# Patient Record
Sex: MALE | Race: WHITE | NOT HISPANIC OR LATINO | Employment: OTHER | URBAN - METROPOLITAN AREA
[De-identification: names, ages, dates, MRNs, and addresses within clinical notes are randomized per-mention and may not be internally consistent; named-entity substitution may affect disease eponyms.]

---

## 2017-05-16 ENCOUNTER — GENERIC CONVERSION - ENCOUNTER (OUTPATIENT)
Dept: OTHER | Facility: OTHER | Age: 72
End: 2017-05-16

## 2017-06-01 ENCOUNTER — TRANSCRIBE ORDERS (OUTPATIENT)
Dept: ADMINISTRATIVE | Facility: HOSPITAL | Age: 72
End: 2017-06-01

## 2017-06-01 ENCOUNTER — APPOINTMENT (OUTPATIENT)
Dept: LAB | Facility: HOSPITAL | Age: 72
End: 2017-06-01
Attending: UROLOGY
Payer: MEDICARE

## 2017-06-01 DIAGNOSIS — R97.20 ELEVATED PROSTATE SPECIFIC ANTIGEN (PSA): ICD-10-CM

## 2017-06-01 LAB — PSA SERPL-MCNC: 4.3 NG/ML (ref 0–4)

## 2017-06-01 PROCEDURE — 84153 ASSAY OF PSA TOTAL: CPT

## 2017-06-01 PROCEDURE — 36415 COLL VENOUS BLD VENIPUNCTURE: CPT

## 2017-06-05 ENCOUNTER — ALLSCRIPTS OFFICE VISIT (OUTPATIENT)
Dept: OTHER | Facility: OTHER | Age: 72
End: 2017-06-05

## 2017-11-13 ENCOUNTER — TRANSCRIBE ORDERS (OUTPATIENT)
Dept: ADMINISTRATIVE | Facility: HOSPITAL | Age: 72
End: 2017-11-13

## 2017-11-13 ENCOUNTER — ALLSCRIPTS OFFICE VISIT (OUTPATIENT)
Dept: OTHER | Facility: OTHER | Age: 72
End: 2017-11-13

## 2017-11-13 ENCOUNTER — APPOINTMENT (OUTPATIENT)
Dept: LAB | Facility: HOSPITAL | Age: 72
End: 2017-11-13
Payer: MEDICARE

## 2017-11-13 DIAGNOSIS — R97.20 ELEVATED PROSTATE SPECIFIC ANTIGEN (PSA): Primary | ICD-10-CM

## 2017-11-13 DIAGNOSIS — R97.20 ELEVATED PROSTATE SPECIFIC ANTIGEN (PSA): ICD-10-CM

## 2017-11-13 LAB
ANION GAP SERPL CALCULATED.3IONS-SCNC: 9 MMOL/L (ref 4–13)
BUN SERPL-MCNC: 12 MG/DL (ref 5–25)
CALCIUM SERPL-MCNC: 9.9 MG/DL (ref 8.3–10.1)
CHLORIDE SERPL-SCNC: 105 MMOL/L (ref 100–108)
CO2 SERPL-SCNC: 29 MMOL/L (ref 21–32)
CREAT SERPL-MCNC: 0.87 MG/DL (ref 0.6–1.3)
GFR SERPL CREATININE-BSD FRML MDRD: 87 ML/MIN/1.73SQ M
GLUCOSE SERPL-MCNC: 97 MG/DL (ref 65–140)
POTASSIUM SERPL-SCNC: 4.4 MMOL/L (ref 3.5–5.3)
SODIUM SERPL-SCNC: 143 MMOL/L (ref 136–145)

## 2017-11-13 PROCEDURE — 80048 BASIC METABOLIC PNL TOTAL CA: CPT

## 2017-11-13 PROCEDURE — 36415 COLL VENOUS BLD VENIPUNCTURE: CPT

## 2017-11-15 ENCOUNTER — HOSPITAL ENCOUNTER (OUTPATIENT)
Dept: MRI IMAGING | Facility: HOSPITAL | Age: 72
Discharge: HOME | End: 2017-11-15
Payer: MEDICARE

## 2017-11-15 DIAGNOSIS — R97.20 ELEVATED PROSTATE SPECIFIC ANTIGEN (PSA): ICD-10-CM

## 2017-11-15 PROCEDURE — A9585 GADOBUTROL INJECTION: HCPCS | Performed by: RADIOLOGY

## 2017-11-15 PROCEDURE — 76377 3D RENDER W/INTRP POSTPROCES: CPT

## 2017-11-15 PROCEDURE — 72197 MRI PELVIS W/O & W/DYE: CPT

## 2017-11-15 RX ADMIN — GADOBUTROL 9 ML: 604.72 INJECTION INTRAVENOUS at 15:43

## 2017-11-15 NOTE — PROGRESS NOTES
Assessment    1  Elevated PSA (790 93) (R97 20)    Plan  Elevated PSA    · Ciprofloxacin HCl - 500 MG Oral Tablet; 500 mg tablet twice daily starting one dayprior to prostate biopsy for three days total   Rx By: Adan Jiang; Dispense: 0 Days ; #:6 Tablet; Refill: 0;For: Elevated PSA; JAQUAN = N; Verified Transmission to [employer]; Last Updated By: System, [employer]; 11/13/2017 9:57:03 AM   · Fleet Enema 7-19 GM/118ML Rectal Enema; USE AS DIRECTED   Rx By: Adan Jiang; Dispense: 0 Days ; #:1 ML; Refill: 0;For: Elevated PSA; JAQUAN = N; Verified Transmission to [employer]; Last Updated By: System, [employer]; 11/13/2017 9:57:02 AM   · (1) BASIC METABOLIC PROFILE; Status:Active; Requested for:13Nov2017;    Perform:[facility]; EFT:[id number]; Ordered;PSA; Ordered By:Vivian Hopper;   · MRI PROSTATE MULTIPARAMETRIC WO W CONTRAST; Status:Need Information -Financial Authorization,Retrospective Authorization; Requested for:13Nov2017;    Perform:[facility]; Order Comments:s/p neg bx with elevating PSA; Due:13Nov2018; Last Updated Abdiel Jacobo; 11/13/2017 9:54:01 AM;Ordered;For:Elevated PSA; Ordered By:Vivian Hopper;   · Prostate Needle Biopsy - POC; Status:Active; Requested for:13Nov2017;    Perform: In Office; [phone]; Ordered;PSA; Ordered By:Vivian Hopper;    Discussion/Summary  Discussion Summary:   30-year-old male managed by Dr Naranjo Bodily  Elevated PSA s/p negative biopsy November 15, 2016 (PSA at time of biopsy was 4 72)  patient's PSA has risen since the time of his biopsy  He presents today with a PSA 5 37  Discussed proceeding with an MRI of his prostate as well as scheduling a TRUS biopsy after obtaining the MRI  Discussed that it will be up to the doctor as to whether or not he should proceed with a biopsy after the MRI has been obtained  If biopsy occurs the patient will need to take ciprofloxacin the day before, of, and after his biopsy   Fleet enema will perform the morning of  He has a history of traumatic brain injury and prior to his last biopsy was cleared by Neurology to have his Aggrenox stopped but to be continued on aspirin 81 mg  This should again apply to this current biopsy  The patient understands and agrees to this treatment plan  All questions and concerns were addressed and answered  Goals and Barriers: The patient has the current Goals: Control constipation with increased fiber in his diet  The patent has the current Barriers: None  Patient's Capacity to Self-Care: Patient is able to Self-Care  Chief Complaint  Chief Complaint Free Text Note Form: pt here for elevated PSA  5 37 (11/02/17)      History of Present Illness  HPI: Kay Garrido is a 62-year-old male here for follow-up evaluation of elevated PSA status post negative prostate biopsy November 30, 2016  PSA at the time of his biopsy was 4 72  PSA in June was 4 3 and now in November it is 5 37  He has no family history of prostate cancer but does have a right-sided prominence on digital rectal examination with no discrete nodule  Presents today with no lower urinary tract complaints aside for occasional nocturia 1-2 times per night  Admits to some intermittent constipation  Review of Systems  Complete-Male Urology:  Constitutional: No fever or chills, feels well, no tiredness, no recent weight gain or weight loss  Respiratory: No complaints of shortness of breath, no wheezing, no cough, no SOB on exertion, no orthopnea or PND  Cardiovascular: No complaints of slow heart rate, no fast heart rate, no chest pain, no palpitations, no leg claudication, no lower extremity  Gastrointestinal: No complaints of abdominal pain, no constipation, no nausea or vomiting, no diarrhea or bloody stools    Genitourinary: Empty sensation-- and-- stream quality good, but-- no dysuria,-- no urinary hesitancy,-- no hematuria,-- no incontinence-- and-- no feelings of urinary urgency--   The patient presents with complaints of nocturia (2 times per night )  Musculoskeletal: No complaints of arthralgia, no myalgias, no joint swelling or stiffness, no limb pain or swelling  Integumentary: No complaints of skin rash or skin lesions, no itching, no skin wound, no dry skin  Hematologic/Lymphatic: No complaints of swollen glands, no swollen glands in the neck, does not bleed easily, no easy bruising  Neurological: No compliants of headache, no confusion, no convulsions, no numbness or tingling, no dizziness or fainting, no limb weakness, no difficulty walking  ROS Reviewed:   ROS reviewed  Active Problems  1  Acute sinusitis (461 9) (J01 90)   2  Degenerative joint disease of right lower extremity (715 98) (M19 90)   3  Elevated PSA (790 93) (R97 20)   4  Encounter for screening for malignant neoplasm of colon (V76 51) (Z12 11)   5  History of seizure disorder (V12 49) (Z86 69)   6  Hyperlipidemia (272 4) (E78 5)   7  Intracranial injury of other and unspecified nature (854 00) (S06 9X9A)   8  Poison ivy (692 6) (L23 7)   9  Right shoulder pain (719 41) (M25 511)   10  Splinter (919 6) (T14 8XXA)   11  Transient cerebral ischemic attack (435 9) (G45 9)   12  Visual disturbances (368 9) (H53 9)    Past Medical History    1  History of Acute sinusitis (461 9) (J01 90)   2  History of Complete Tear Of The Anterior Cruciate Ligament Of The Right Knee (844 2)   3  History of Encounter for prostate cancer screening (V76 44) (Z12 5)   4  History of Encounter for screening colonoscopy (V76 51) (Z12 11)   5  History of Finger, superficial foreign body (splinter) (915 6) (S60 459A)   6  History of Need for prophylactic vaccination and inoculation against influenza (V04 81) (Z23)   7  History of Rib Fracture (807 00)  Active Problems And Past Medical History Reviewed: The active problems and past medical history were reviewed and updated today  Surgical History  1  History of Cataract Extraction   2   History of Needle Biopsy Of Prostate   3  History of Tonsillectomy  Surgical History Reviewed: The surgical history was reviewed and updated today  Family History  Mother    1  Family history of Hypertension (V17 49)  Father    2  Family history of Alzheimer Disease   3  Family history of Congestive Heart Failure  Family History    4  Denied: Family history of Drug abuse  Family History Reviewed: The family history was reviewed and updated today  Social History     · Being A Social Drinker   · Caffeine Use   · Dental care, regularly   · Former smoker (V15 82) (V78 713)   · Good dental hygiene   ·    · Sun Protection Sunscreen   · Uses Safety Equipment - Seatbelts  Social History Reviewed: The social history was reviewed and updated today  The social history was reviewed and is unchanged  Current Meds   1  Aggrenox  MG Oral Capsule Extended Release 12 Hour; Therapy: 38CHI5899 to (Last Rx:06Oct2011)  Requested for: 06Oct2011 Ordered   2  B Complex TABS; Therapy: (Recorded:39Olm6976) to Recorded   3  [employer] Loratadine 10 MG Oral Tablet; Therapy: (Cee Bonilla) to Recorded   4  Diclofenac Sodium 1 % Transdermal Gel; Apply to affected area QID; Therapy: 83VVJ9191 to (Evaluate:52Fwm0155); Last Rx:[id number] Ordered   5  Fish Oil CAPS; Therapy: ((08) 9009 7781) to Recorded   6  Garlic CAPS; Therapy: (Recorded:15Nov2016) to Recorded   7  Glucosamine CAPS; Therapy: (Recorded:15Nov2016) to Recorded   8  LevETIRAcetam 500 MG Oral Tablet; Therapy: 00XXF7601 to Recorded   9  Multiple Vitamin TABS; Therapy: (([phone] 9009 7781) to Recorded   10  Plaquenil TABS; Take 1 tablet twice daily Recorded   11  Simvastatin 10 MG Oral Tablet; Take 1 tablet daily Recorded   12  Vitamin C TABS; Therapy: ((08) 9009 7781) to Recorded   13  Vitamin E-400 CAPS; Therapy: ((08) 9009 7781) to Recorded   14  Zocor 20 MG Oral Tablet; Therapy: (Recorded:05Jun2017) to Recorded  Medication List Reviewed:    The medication list was reviewed and updated today  Allergies  1  No Known Drug Allergies    Vitals  Vital Signs    Recorded: 31BTV4357 09:45AM   Heart Rate 66   Systolic 259   Diastolic 70   Height 6 ft    Weight 221 lb    BMI Calculated 29 97   BSA Calculated 2 22       Physical Exam   Constitutional  General appearance: No acute distress, well appearing and well nourished  Eyes  Conjunctiva and lids: No erythema, swelling or discharge  Ears, Nose, Mouth, and Throat  Hearing: Normal    Pulmonary  Respiratory effort: No increased work of breathing or signs of respiratory distress  Abdomen  Abdomen: Non-tender, no masses  Genitourinary deferred  Musculoskeletal  Gait and station: Normal    Psychiatric  Mood and affect: Normal        Results/Data  (1) PSA, DIAGNOSTIC (FOLLOW-UP) [id number] 05:46PM Jan Medrano Order Number: [id number]     Test Name Result Flag Reference   PSA 4 3 ng/mL H 0 0-4 0   American Urological Association Guidelines define biochemical recurrence of prostate cancer as a detectable or rising PSA value post-radical prostatectomy that is greater than or equal to 0 2 ng/mL with a second confirmatory level of greater than or equal to 0 2 ng/mL         Signatures   Electronically signed by : [facility] MARTHA, ; Nov 13 2017 10:11AM EST                       (Author)    Electronically signed by : JAZ Horn ; Nov 14 2017  2:53PM EST

## 2017-12-05 DIAGNOSIS — R97.20 ELEVATED PROSTATE SPECIFIC ANTIGEN (PSA): ICD-10-CM

## 2018-01-14 VITALS
SYSTOLIC BLOOD PRESSURE: 138 MMHG | HEIGHT: 72 IN | HEART RATE: 66 BPM | DIASTOLIC BLOOD PRESSURE: 70 MMHG | WEIGHT: 221 LBS | BODY MASS INDEX: 29.93 KG/M2

## 2018-01-14 VITALS
SYSTOLIC BLOOD PRESSURE: 120 MMHG | HEART RATE: 58 BPM | WEIGHT: 230 LBS | DIASTOLIC BLOOD PRESSURE: 80 MMHG | BODY MASS INDEX: 31.19 KG/M2

## 2018-01-18 NOTE — RESULT NOTES
Verified Results  (1) TISSUE EXAM [id number] 12:00AM Evangelist Singleton     Test Name Result Flag Reference   LAB AP CASE REPORT (Report)     Surgical Pathology Report             Case: [id number]                   Authorizing Provider: Blanca Ray MD  Collected:      11/30/2016           Pathologist:      Wally Bruno MD   Received:      12/01/2016 1024        Specimens:  A) - Prostate, Left PZ                                         B) - Prostate, Right PZ                                        C) - Prostate, Left CZ                                         D) - Prostate, Right CZ   LAB AP FINAL DIAGNOSIS (Report)     A  Prostate, left peripheral zone (PZ), needle core biopsy:    - Benign prostatic tissue with foci of glandular atrophy  B  Prostate, right peripheral zone (PZ), needle core biopsy:    - Benign prostatic tissue with mild acute and chronic inflammation  C  Prostate, left central zone (CZ), needle core biopsy:    - Benign prostatic tissue  D  Prostate, right central zone (CZ), needle core biopsy:    - Benign prostatic tissue with mild chronic inflammation  Electronically signed by Wally Bruno MD on 12/2/2016 at 12:46 PM   LAB AP NOTE      Interpretation performed at , [address]   LAB AP SURGICAL ADDITIONAL INFORMATION (Report)     These tests were developed and their performance characteristics   determined by [facility] or [employer]  They may not be cleared or approved by the [place]  Food and   Drug Administration  The FDA has determined that such clearance or   approval is not necessary  These tests are used for clinical purposes  They should not be regarded as investigational or for research  This   laboratory has been approved by CLIA 88, designated as a high-complexity   laboratory and is qualified to perform these tests  LAB AP GROSS DESCRIPTION (Report)     A   The specimen is received in formalin, labeled with the patient's name   and hospital number, and is designated prostate needle biopsy, left PZ  The specimen consists of 4 white to tan soft tissue cores ranging from 0 9   to 1 3 centimeters in length and measuring no greater than 0 1 centimeters   in diameter  Entirely submitted  2 cassettes  2 cores per cassette  B  The specimen is received in formalin, labeled with the patient's name   and hospital number, and is designated Prostate needle biopsy, right   PZ  The specimen consists of 4 white to tan soft tissue cores ranging   from 0 6 to 1 1 centimeters in length and measuring no greater than 0 1   centimeters in diameter  The specimen also contains multiple tan to white   soft tissue fragment measuring no greater than 0 1 centimeters in greatest   dimension  Entirely submitted  2 cassettes  1: 2 cores  2: 2 cores and remaining tissue fragments  C  The specimen is received in formalin, labeled with the patient's name   and hospital number, and is designated Prostate needle biopsy, left CZ  The specimen consists of 2 white to tan soft tissue cores measuring 1 5   and 1 6 centimeters in length and measuring no greater than 0 1   centimeters in diameter  Entirely submitted  One cassette  D  The specimen is received in formalin, labeled with the patient's name   and hospital number, and is designated Prostate needle biopsy, right   CZ  Specimen consists of 2 white to tan soft tissue cores measuring 1 4   and 1 6 centimeters in length and measuring no greater than 0 1   centimeters in diameter  Entirely submitted  One cassette  No collection time is given, therefore no fixation time can be determined      ACL

## 2018-01-23 ENCOUNTER — TRANSCRIBE ORDERS (OUTPATIENT)
Dept: ADMINISTRATIVE | Facility: HOSPITAL | Age: 73
End: 2018-01-23

## 2018-01-23 ENCOUNTER — LAB (OUTPATIENT)
Dept: LAB | Facility: HOSPITAL | Age: 73
End: 2018-01-23
Payer: MEDICARE

## 2018-01-23 DIAGNOSIS — M06.9 RHEUMATOID ARTHRITIS, INVOLVING UNSPECIFIED SITE, UNSPECIFIED RHEUMATOID FACTOR PRESENCE: Primary | ICD-10-CM

## 2018-01-23 DIAGNOSIS — M06.9 RHEUMATOID ARTHRITIS, INVOLVING UNSPECIFIED SITE, UNSPECIFIED RHEUMATOID FACTOR PRESENCE: ICD-10-CM

## 2018-01-23 LAB
ALBUMIN SERPL BCP-MCNC: 4.3 G/DL (ref 3.5–5)
ALP SERPL-CCNC: 79 U/L (ref 46–116)
ALT SERPL W P-5'-P-CCNC: 44 U/L (ref 12–78)
ANION GAP SERPL CALCULATED.3IONS-SCNC: 10 MMOL/L (ref 4–13)
AST SERPL W P-5'-P-CCNC: 30 U/L (ref 5–45)
BASOPHILS # BLD AUTO: 0.03 THOUSANDS/ΜL (ref 0–0.1)
BASOPHILS NFR BLD AUTO: 1 % (ref 0–1)
BILIRUB SERPL-MCNC: 0.7 MG/DL (ref 0.2–1)
BUN SERPL-MCNC: 15 MG/DL (ref 5–25)
CALCIUM SERPL-MCNC: 9.4 MG/DL (ref 8.3–10.1)
CHLORIDE SERPL-SCNC: 105 MMOL/L (ref 100–108)
CO2 SERPL-SCNC: 26 MMOL/L (ref 21–32)
CREAT SERPL-MCNC: 0.92 MG/DL (ref 0.6–1.3)
CRP SERPL QL: <3 MG/L
EOSINOPHIL # BLD AUTO: 0.08 THOUSAND/ΜL (ref 0–0.61)
EOSINOPHIL NFR BLD AUTO: 2 % (ref 0–6)
ERYTHROCYTE [DISTWIDTH] IN BLOOD BY AUTOMATED COUNT: 12.4 % (ref 11.6–15.1)
ERYTHROCYTE [SEDIMENTATION RATE] IN BLOOD: 6 MM/HOUR (ref 0–10)
GFR SERPL CREATININE-BSD FRML MDRD: 84 ML/MIN/1.73SQ M
GLUCOSE SERPL-MCNC: 91 MG/DL (ref 65–140)
HCT VFR BLD AUTO: 46.2 % (ref 36.5–49.3)
HGB BLD-MCNC: 15.9 G/DL (ref 12–17)
LYMPHOCYTES # BLD AUTO: 1.61 THOUSANDS/ΜL (ref 0.6–4.47)
LYMPHOCYTES NFR BLD AUTO: 43 % (ref 14–44)
MCH RBC QN AUTO: 34.3 PG (ref 26.8–34.3)
MCHC RBC AUTO-ENTMCNC: 34.4 G/DL (ref 31.4–37.4)
MCV RBC AUTO: 100 FL (ref 82–98)
MONOCYTES # BLD AUTO: 0.43 THOUSAND/ΜL (ref 0.17–1.22)
MONOCYTES NFR BLD AUTO: 11 % (ref 4–12)
NEUTROPHILS # BLD AUTO: 1.61 THOUSANDS/ΜL (ref 1.85–7.62)
NEUTS SEG NFR BLD AUTO: 43 % (ref 43–75)
PLATELET # BLD AUTO: 194 THOUSANDS/UL (ref 149–390)
PMV BLD AUTO: 9.8 FL (ref 8.9–12.7)
POTASSIUM SERPL-SCNC: 4 MMOL/L (ref 3.5–5.3)
PROT SERPL-MCNC: 7.1 G/DL (ref 6.4–8.2)
RBC # BLD AUTO: 4.64 MILLION/UL (ref 3.88–5.62)
SODIUM SERPL-SCNC: 141 MMOL/L (ref 136–145)
WBC # BLD AUTO: 3.76 THOUSAND/UL (ref 4.31–10.16)

## 2018-01-23 PROCEDURE — 36415 COLL VENOUS BLD VENIPUNCTURE: CPT

## 2018-01-23 PROCEDURE — 80053 COMPREHEN METABOLIC PANEL: CPT

## 2018-01-23 PROCEDURE — 86140 C-REACTIVE PROTEIN: CPT

## 2018-01-23 PROCEDURE — 85025 COMPLETE CBC W/AUTO DIFF WBC: CPT

## 2018-01-23 PROCEDURE — 85652 RBC SED RATE AUTOMATED: CPT

## 2018-03-15 ENCOUNTER — TELEPHONE (OUTPATIENT)
Dept: UROLOGY | Facility: CLINIC | Age: 73
End: 2018-03-15

## 2018-03-15 NOTE — TELEPHONE ENCOUNTER
Pt called  Pt has history elevated PSA  Pt scheduled for March 27th appt but pt cannot make this visit  Pt is asking if he needs visit,  Can he have the PSA done followed by phone call? Pt aware if PSA abnormal, he will need visit then    Thanks

## 2018-03-15 NOTE — TELEPHONE ENCOUNTER
If number stable can f/u in November which will be year from last visit  We should review PSA by phone

## 2018-03-20 ENCOUNTER — TRANSCRIBE ORDERS (OUTPATIENT)
Dept: LAB | Facility: MEDICAL CENTER | Age: 73
End: 2018-03-20

## 2018-03-20 ENCOUNTER — APPOINTMENT (OUTPATIENT)
Dept: LAB | Facility: MEDICAL CENTER | Age: 73
End: 2018-03-20
Payer: MEDICARE

## 2018-03-20 DIAGNOSIS — R97.20 ELEVATED PROSTATE SPECIFIC ANTIGEN (PSA): ICD-10-CM

## 2018-03-20 LAB — PSA SERPL-MCNC: 5.3 NG/ML (ref 0–4)

## 2018-03-20 PROCEDURE — 36415 COLL VENOUS BLD VENIPUNCTURE: CPT

## 2018-03-20 PROCEDURE — 84153 ASSAY OF PSA TOTAL: CPT

## 2018-03-27 ENCOUNTER — OFFICE VISIT (OUTPATIENT)
Dept: UROLOGY | Facility: CLINIC | Age: 73
End: 2018-03-27
Payer: MEDICARE

## 2018-03-27 VITALS
RESPIRATION RATE: 16 BRPM | HEART RATE: 68 BPM | BODY MASS INDEX: 30.34 KG/M2 | DIASTOLIC BLOOD PRESSURE: 80 MMHG | WEIGHT: 224 LBS | SYSTOLIC BLOOD PRESSURE: 120 MMHG | HEIGHT: 72 IN

## 2018-03-27 DIAGNOSIS — R97.20 ELEVATED PSA: Primary | ICD-10-CM

## 2018-03-27 PROCEDURE — 99213 OFFICE O/P EST LOW 20 MIN: CPT | Performed by: UROLOGY

## 2018-03-27 RX ORDER — LEVETIRACETAM 500 MG/1
TABLET ORAL
COMMUNITY
Start: 2013-09-06 | End: 2021-10-27 | Stop reason: ALTCHOICE

## 2018-03-27 RX ORDER — SIMVASTATIN 10 MG
1 TABLET ORAL DAILY
COMMUNITY

## 2018-03-27 RX ORDER — HYDROCORTISONE ACETATE 0.5 %
CREAM (GRAM) TOPICAL
COMMUNITY

## 2018-03-27 RX ORDER — LORATADINE 10 MG/1
TABLET ORAL
COMMUNITY

## 2018-03-27 RX ORDER — RIBOFLAVIN (VITAMIN B2) 100 MG
TABLET ORAL
COMMUNITY

## 2018-03-27 RX ORDER — VITAMIN B COMPLEX
TABLET ORAL
COMMUNITY

## 2018-03-27 RX ORDER — ASPIRIN AND DIPYRIDAMOLE 25; 200 MG/1; MG/1
CAPSULE, EXTENDED RELEASE ORAL
COMMUNITY
Start: 2011-10-06 | End: 2022-07-22

## 2018-03-27 RX ORDER — VITAMIN E 268 MG
CAPSULE ORAL
COMMUNITY

## 2018-03-27 RX ORDER — HYDROXYCHLOROQUINE SULFATE 200 MG/1
1 TABLET, FILM COATED ORAL 2 TIMES DAILY
COMMUNITY

## 2018-03-27 NOTE — PROGRESS NOTES
UROLOGY ROUTINE FOLLOW UP NOTE     History of Present Illness:   Jose Arango is a 81 y o  male with a history of an elevated PSA  Prior to come into my care, the patient's PSA was in the range of 3, in 2016, the patient's PSA elevated to 4 7  He underwent a prostate biopsy which showed inflammation  Prostate size was approximately 30 g  Patient's PSA then continued to rise to 5 3 in November of 2017 and stable at 5 3 in March of 2017  The patient shows up today for an appointment  He has some concerns about the level of his PSA value  Past Medical History:     Past Medical History:   Diagnosis Date    Arthritis     Concussion     Elevated PSA     Hyperlipidemia     Seizure ([facility] [address] )     TIA (transient ischemic attack)     Traumatic brain injury ([facility] [address] )        PAST SURGICAL HISTORY:   History reviewed  No pertinent surgical history  CURRENT MEDICATIONS:     Current Outpatient Prescriptions   Medication Sig Dispense Refill    Ascorbic Acid (VITAMIN C) 100 MG tablet Take by mouth      aspirin-dipyridamole (AGGRENOX)  mg per 12 hr capsule Take by mouth      B Complex Vitamins (B COMPLEX 50) TABS Take by mouth      diclofenac sodium (VOLTAREN) 1 % Place on the skin      Garlic 10 MG CAPS Take by mouth      Glucosamine-Chondroit-Vit C-Mn (GLUCOSAMINE 1500 COMPLEX) CAPS Take by mouth      hydroxychloroquine (PLAQUENIL) 200 mg tablet Take 1 tablet by mouth 2 (two) times a day      levETIRAcetam (KEPPRA) 500 mg tablet Take by mouth      loratadine (CLARITIN) 10 mg tablet Take by mouth      Multiple Vitamin-Folic Acid TABS Take by mouth      Omega-3 Fatty Acids (FISH OIL) 645 MG CAPS Take by mouth      simvastatin (ZOCOR) 10 mg tablet Take 1 tablet by mouth daily      vitamin E, tocopherol, (VITAMIN E-400) 400 units capsule Take by mouth       No current facility-administered medications for this visit          ALLERGIES:   No Known Allergies    SOCIAL HISTORY:     Social History Social History    Marital status: /Civil Union     Spouse name: N/A    Number of children: N/A    Years of education: N/A     Social History Main Topics    Smoking status: Never Smoker    Smokeless tobacco: Never Used    Alcohol use Yes    Drug use: No    Sexual activity: Not Asked     Other Topics Concern    None     Social History Narrative    None       SOCIAL HISTORY:     Family History   Problem Relation Age of Onset    Alzheimer's disease Father        REVIEW OF SYSTEMS:     General: negative for chills, fatigue, fever, significant unplanned weight changed  Psychological: negative for anxiety, depression, concentration or memory difficulties, irritability, mood swings, sleep disturbances  Ophthalmic: negative for blurry vision or double  ENT: negative for hearing difficulties, tinnitus, vertigo  Hematological and Lymphatic: negative for bleeding problems, blood clots, bruising, swollen lymph nodes  Respiratory: negative for shortness of breath, cough, hemoptysis, orthopnea, tachypnea or wheezing  Cardiovascular: negative for chest pain, dyspnea on exertion, edema, irregular or rapid heartbeat, paroxysmal nocturnal dyspnea  Gastrointestinal: negative for abdominal pain, bright red blood in stools, change in stools, constipation, diarrhea, nausea/vomiting, stool incontinence    GENITOURINARY: see HPI    Musculoskeletal: negative for gait disturbance, joint pain, joint stiffness/sweeling/pain, muscular weakness  Dermatological: negative for rash or skin lesion changes  Neurological: negative for confusion, dizziness, headaches, memory loss, numbness/tingling, seizures, speech problems, tremors or weakness    PHYSICAL EXAM:     /80   Pulse 68   Resp 16   Ht 6' (1 829 m)   Wt 102 kg (224 lb)   BMI 30 38 kg/m²   General:  Healthy appearing individual in no acute distress  They have a normal affect  There is not appear to be any gross neurologic defects or abnormalities    HEENT: Normocephalic, atraumatic  Neck is supple without any palpable lymphadenopathy  Cardiovascular:  Patient has normal palpable distal radial pulses  There is no significant peripheral edema  No JVD is noted  Respiratory:  Patient has unlabored respirations  There is no audible wheeze or rhonchi  Abdomen:  Abdomen is without surgical scars  Abdomen is soft and nontender  There is no tympany  Inguinal and umbilical hernia are not appreciated  Genitourinary: no penile lesions or discharge, no testicular masses or tenderness, no hernias  RUBÉN with some asymmetry in the RIGHT hemiprostate  Musculoskeletal:  Patient does not have significant CVA tenderness with palpation or percussion  They full range of motion in all 4 extremities  Strength in all 4 extremities appears congruent  Patient is able to ambulate without assistance or difficulty  Dermatologic:  Patient has no skin abnormalities or rashes       LABS:     CBC:   Lab Results   Component Value Date    WBC 3 76 (L) 01/23/2018    HGB 15 9 01/23/2018    HCT 46 2 01/23/2018     (H) 01/23/2018     01/23/2018       BMP:   Lab Results   Component Value Date    GLUCOSE 91 01/23/2018    CALCIUM 9 4 01/23/2018     01/23/2018    K 4 0 01/23/2018    CO2 26 01/23/2018     01/23/2018    BUN 15 01/23/2018    CREATININE 0 92 01/23/2018     Lab Results   Component Value Date    PSA 5 3 (H) 03/20/2018    PSA 4 3 (H) 06/01/2017       PATHOLOGY:     11/30/16  Case Report     Case Report   Surgical Pathology Report                         Case: [id number]                                    Authorizing Provider: Kenn Tejada MD   Collected:           11/30/2016                    Pathologist:           Glen Peters MD      Received:            12/01/2016 1024               Specimens:   A) - Prostate, Left PZ                                                                               B) - Prostate, Right PZ                                                                              C) - Prostate, Left CZ                                                                               D) - Prostate, Right CZ                                                                    Final Diagnosis   A  Prostate, left peripheral zone (PZ), needle core biopsy:     - Benign prostatic tissue with foci of glandular atrophy      B  Prostate, right peripheral zone (PZ), needle core biopsy:     - Benign prostatic tissue with mild acute and chronic inflammation      C  Prostate, left central zone (CZ), needle core biopsy:     - Benign prostatic tissue      D  Prostate, right central zone (CZ), needle core biopsy:     - Benign prostatic tissue with mild chronic inflammation         ASSESSMENT:     81 y o  male with persistently elevated PSA follow prior negative biopsy    PLAN:     mpMRI of prostate and return to clinic to discuss repeat biopsy

## 2018-04-10 ENCOUNTER — APPOINTMENT (OUTPATIENT)
Dept: LAB | Facility: HOSPITAL | Age: 73
End: 2018-04-10
Attending: UROLOGY
Payer: MEDICARE

## 2018-04-10 LAB
ANION GAP SERPL CALCULATED.3IONS-SCNC: 7 MMOL/L (ref 4–13)
BUN SERPL-MCNC: 21 MG/DL (ref 5–25)
CALCIUM SERPL-MCNC: 9.1 MG/DL
CHLORIDE SERPL-SCNC: 106 MMOL/L (ref 100–108)
CO2 SERPL-SCNC: 31 MMOL/L (ref 21–32)
CREAT SERPL-MCNC: 1.12 MG/DL (ref 0.6–1.3)
GFR SERPL CREATININE-BSD FRML MDRD: 66 ML/MIN/1.73SQ M
GLUCOSE SERPL-MCNC: 95 MG/DL (ref 65–140)
POTASSIUM SERPL-SCNC: 4 MMOL/L (ref 3.5–5.3)
SODIUM SERPL-SCNC: 144 MMOL/L (ref 136–145)

## 2018-04-10 PROCEDURE — 80048 BASIC METABOLIC PNL TOTAL CA: CPT | Performed by: UROLOGY

## 2018-04-10 PROCEDURE — 36415 COLL VENOUS BLD VENIPUNCTURE: CPT | Performed by: UROLOGY

## 2018-04-17 ENCOUNTER — HOSPITAL ENCOUNTER (OUTPATIENT)
Dept: MRI IMAGING | Facility: HOSPITAL | Age: 73
Discharge: HOME | End: 2018-04-17
Attending: UROLOGY
Payer: MEDICARE

## 2018-04-17 DIAGNOSIS — R97.20 ELEVATED PSA: ICD-10-CM

## 2018-04-17 PROCEDURE — A9585 GADOBUTROL INJECTION: HCPCS | Performed by: UROLOGY

## 2018-04-17 PROCEDURE — 72197 MRI PELVIS W/O & W/DYE: CPT

## 2018-04-17 PROCEDURE — 76377 3D RENDER W/INTRP POSTPROCES: CPT

## 2018-04-17 RX ADMIN — GADOBUTROL 9 ML: 604.72 INJECTION INTRAVENOUS at 11:47

## 2018-05-01 ENCOUNTER — TELEPHONE (OUTPATIENT)
Dept: UROLOGY | Facility: CLINIC | Age: 73
End: 2018-05-01

## 2018-05-01 DIAGNOSIS — R97.20 ELEVATED PSA: Primary | ICD-10-CM

## 2018-05-01 NOTE — TELEPHONE ENCOUNTER
Pt called stating he is unable to keep appointment with Dr German Gupta for 5/16/2018  Pt has history elevated PSA and Dr German Gupta ordered MRI of prostate  Appointment scheduled to review MRI report  Discussed with Dr German Gupta   Per MD MRI is normal and may see pt in 6 months with PSA prior to visit  Informed pt MRI normal and appointment scheduled for   11/13/2018   Order placed for PSA prior to visit

## 2018-07-17 ENCOUNTER — APPOINTMENT (OUTPATIENT)
Dept: URGENT CARE | Facility: CLINIC | Age: 73
End: 2018-07-17
Payer: OTHER MISCELLANEOUS

## 2018-07-17 PROCEDURE — 99203 OFFICE O/P NEW LOW 30 MIN: CPT

## 2018-10-03 ENCOUNTER — TELEPHONE (OUTPATIENT)
Dept: UROLOGY | Facility: CLINIC | Age: 73
End: 2018-10-03

## 2018-10-03 NOTE — TELEPHONE ENCOUNTER
Patient managed by Dr Zayra Shaw at the [place] office  Patient seen for elevated PSA  Patient has appointment 11/2018  Patient called to cancel appointment for November 2018  Patient plans to obtain PSA at the [place] and will have the [place] fax results to [place] office      If result are elevated patient aware he will need office visit with MD

## 2019-11-05 ENCOUNTER — TELEPHONE (OUTPATIENT)
Dept: UROLOGY | Facility: CLINIC | Age: 74
End: 2019-11-05

## 2019-11-05 DIAGNOSIS — R97.20 ELEVATED PSA: Primary | ICD-10-CM

## 2019-11-05 NOTE — TELEPHONE ENCOUNTER
He can see me in January as scheduled  His MRI was negative and I ordered a PSA to be done in January

## 2019-11-05 NOTE — TELEPHONE ENCOUNTER
This patient came by the office to update us on his PSA=7 825 from the [place] on 05/2019  He is concerned about the rising level and requested an appointment with you to discuss on January 6  Does he need additional blood work prior to his visit?

## 2020-01-02 ENCOUNTER — APPOINTMENT (OUTPATIENT)
Dept: LAB | Facility: MEDICAL CENTER | Age: 75
End: 2020-01-02
Payer: MEDICARE

## 2020-01-02 DIAGNOSIS — R97.20 ELEVATED PSA: ICD-10-CM

## 2020-01-02 LAB — PSA SERPL-MCNC: 5.6 NG/ML (ref 0–4)

## 2020-01-02 PROCEDURE — 84153 ASSAY OF PSA TOTAL: CPT

## 2020-01-06 ENCOUNTER — OFFICE VISIT (OUTPATIENT)
Dept: UROLOGY | Facility: CLINIC | Age: 75
End: 2020-01-06
Payer: MEDICARE

## 2020-01-06 VITALS
SYSTOLIC BLOOD PRESSURE: 108 MMHG | HEART RATE: 66 BPM | BODY MASS INDEX: 28.73 KG/M2 | WEIGHT: 211.86 LBS | DIASTOLIC BLOOD PRESSURE: 72 MMHG

## 2020-01-06 DIAGNOSIS — R31.0 GROSS HEMATURIA: ICD-10-CM

## 2020-01-06 DIAGNOSIS — R97.20 ELEVATED PSA: Primary | ICD-10-CM

## 2020-01-06 PROCEDURE — 99214 OFFICE O/P EST MOD 30 MIN: CPT | Performed by: UROLOGY

## 2020-01-06 NOTE — PROGRESS NOTES
[address] [facility]  [facility]  [address] [facility] [place]  [place], [address] [facility]  [phone]  www  [facility]  org      NAME: Jae Acuna  AGE: 69 y o  SEX: male  : 1945   MRN: [id number]    DATE: 2020  TIME: 3:11 PM    Assessment and Plan:    Elevated PSA:  In my mind, this is basically stable  It was up to 7 825 when he had a urinary tract infection and it is now down to 5 6  He has had 1 biopsy and 2 MRIs  I recommend rechecking this in 6 months-he had inflammation on his previous biopsies and it may very well be that his elevated PSA is due to inflammation  He also had the recent urinary tract infection with gross hematuria in May of 2019, and we discussed cystoscopy and renal ultrasound  He does not wish to have cystoscopy but he is agreeable to renal ultrasound and I will order this and I will send him the results  Follow-up in 6 months with a PSA  Chief Complaint     Chief Complaint   Patient presents with    Elevated PSA       History of Present Illness   82-year-old man with history of elevated PSA  Underwent prostate biopsy which showed inflammation 2016  PSA is 5 6 as of 2020  It was 5 3 3/20/2018  Multiparametric MRI of the prostate 2018 showed pie rads 1  The PSA has been slowly climbing however  His PSA was 7 8 at the [place] on May 2019  However at that time he was having blood in his urine and had a urinary tract infection  Erectile dysfunction:  He gets Viagra sometimes from the [place] which works for him      The following portions of the patient's history were reviewed and updated as appropriate: allergies, current medications, past family history, past medical history, past social history, past surgical history and problem list   Past Medical History:   Diagnosis Date    Arthritis     Concussion     Elevated PSA     Hyperlipidemia     Seizure ([address] )     TIA (transient ischemic attack)     Traumatic [facility] injury [facility])      History reviewed  No pertinent surgical history  shoulder  Review of Systems   Review of Systems   Genitourinary: Negative  Active Problem List   There is no problem list on file for this patient  Objective   /72   Pulse 66   Wt 96 1 kg (211 lb 13 8 oz)   BMI 28 73 kg/m²     Physical Exam   Constitutional: He is oriented to person, place, and time  He appears well-developed and well-nourished  HENT:   Head: Normocephalic and atraumatic  Eyes: EOM are normal    Neck: Normal range of motion  Pulmonary/Chest: Effort normal    Abdominal: Soft  He exhibits no distension and no mass  There is no tenderness  There is no rebound and no guarding  No hernia  Genitourinary: Rectum normal, prostate normal and penis normal    Genitourinary Comments: Testicles are descended bilaterally within normal limits  Normal circumcised phallus  Rectal exam reveals normal tone, no masses and his prostate is about 40 grams, smooth symmetric benign  No nodules  Musculoskeletal: Normal range of motion  Neurological: He is alert and oriented to person, place, and time  Skin: Skin is warm and dry  Psychiatric: He has a normal mood and affect   His behavior is normal  Judgment and thought content normal            Current Medications     Current Outpatient Medications:     Ascorbic Acid (VITAMIN C) 100 MG tablet, Take by mouth, Disp: , Rfl:     aspirin-dipyridamole (AGGRENOX)  mg per 12 hr capsule, Take by mouth, Disp: , Rfl:     B Complex Vitamins (B COMPLEX 50) TABS, Take by mouth, Disp: , Rfl:     diclofenac sodium (VOLTAREN) 1 %, Place on the skin, Disp: , Rfl:     Garlic 10 MG CAPS, Take by mouth, Disp: , Rfl:     Glucosamine-Chondroit-Vit C-Mn (GLUCOSAMINE 1500 COMPLEX) CAPS, Take by mouth, Disp: , Rfl:     hydroxychloroquine (PLAQUENIL) 200 mg tablet, Take 1 tablet by mouth 2 (two) times a day, Disp: , Rfl:     levETIRAcetam (KEPPRA) 500 mg tablet, Take by mouth, Disp: , Rfl:     loratadine (CLARITIN) 10 mg tablet, Take by mouth, Disp: , Rfl:     Multiple Vitamin-Folic Acid TABS, Take by mouth, Disp: , Rfl:     Omega-3 Fatty Acids (FISH OIL) 645 MG CAPS, Take by mouth, Disp: , Rfl:     simvastatin (ZOCOR) 10 mg tablet, Take 1 tablet by mouth daily, Disp: , Rfl:     vitamin E, tocopherol, (VITAMIN E-400) 400 units capsule, Take by mouth, Disp: , Rfl:         Bhupendra Goins MD

## 2020-01-09 ENCOUNTER — HOSPITAL ENCOUNTER (OUTPATIENT)
Dept: ULTRASOUND IMAGING | Facility: HOSPITAL | Age: 75
Discharge: HOME | End: 2020-01-09
Attending: UROLOGY
Payer: MEDICARE

## 2020-01-09 DIAGNOSIS — R31.0 GROSS HEMATURIA: ICD-10-CM

## 2020-01-09 PROCEDURE — 76770 US EXAM ABDO BACK WALL COMP: CPT

## 2020-01-14 ENCOUNTER — OFFICE VISIT (OUTPATIENT)
Dept: OTOLARYNGOLOGY | Facility: CLINIC | Age: 75
End: 2020-01-14
Payer: MEDICARE

## 2020-01-14 VITALS
SYSTOLIC BLOOD PRESSURE: 122 MMHG | DIASTOLIC BLOOD PRESSURE: 72 MMHG | OXYGEN SATURATION: 98 % | HEIGHT: 72 IN | HEART RATE: 66 BPM | WEIGHT: 210 LBS | BODY MASS INDEX: 28.44 KG/M2

## 2020-01-14 DIAGNOSIS — H90.3 SENSORINEURAL HEARING LOSS (SNHL) OF BOTH EARS: ICD-10-CM

## 2020-01-14 DIAGNOSIS — H61.23 BILATERAL IMPACTED CERUMEN: Primary | ICD-10-CM

## 2020-01-14 PROCEDURE — 99203 OFFICE O/P NEW LOW 30 MIN: CPT | Performed by: OTOLARYNGOLOGY

## 2020-01-14 PROCEDURE — 69210 REMOVE IMPACTED EAR WAX UNI: CPT | Performed by: OTOLARYNGOLOGY

## 2020-01-14 NOTE — PROGRESS NOTES
Consultation - Otolaryngology - Head and Neck Surgery  Facial Plastic and Reconstructive Surgery  Kathy Cooper 69 y o  male MRN: [id number]  Encounter: [id number]        Assessment/Plan:  1  Bilateral impacted cerumen     2  Sensorineural hearing loss (SNHL) of both ears         Cerumen impaction:  We discussed the nature of cerumen impaction  We discussed appropriate treat with hydrogen peroxide 4-5 drops once per week  We discussed discontinuing the use of any Q-Tips or other objects into the ear  He can follow up in 1 year or on an as needed basis  [facility]  History of Present Illness   Physician Requesting Consult: Heber Burrows PA-C  Reason for Consult / Principal Problem: Cerumen impaction  HPI: Kathy Cooper is a 69y o  year old male who presents with bilateral cerumen impactions  He has been using bilateral hearing aids provided by the [address] for 1 year now  He has been tolerating this well though notes some cerumen in the ear pieces  No associated ear pain or drainage  He has not tried cleaning ears with anything  No other concerns at this time  Review of systems:  ROS was performed by the MA and documented in the attached note  This was reviewed personally  Historical Information   Past Medical History:   Diagnosis Date    Arthritis     Concussion     Elevated PSA     Hyperlipidemia     [address] [address]     TIA (transient ischemic attack)     Traumatic brain injury [facility])      History reviewed  No pertinent surgical history    Social History   Social History     Substance and Sexual Activity   Alcohol Use Yes    Frequency: 2-4 times a month     Social History     Substance and Sexual Activity   Drug Use Yes    Types: Marijuana     Social History     Tobacco Use   Smoking Status Never Smoker   Smokeless Tobacco Never Used     Family History:   Family History   Problem Relation Age of Onset    Alzheimer's disease Father        Current Outpatient Medications on File Prior to Visit   Medication Sig    Ascorbic Acid (VITAMIN C) 100 MG tablet Take by mouth    aspirin-dipyridamole (AGGRENOX)  mg per 12 hr capsule Take by mouth    B Complex Vitamins (B COMPLEX 50) TABS Take by mouth    diclofenac sodium (VOLTAREN) 1 % Place on the skin    Garlic 10 MG CAPS Take by mouth    Glucosamine-Chondroit-Vit C-Mn (GLUCOSAMINE 1500 COMPLEX) CAPS Take by mouth    hydroxychloroquine (PLAQUENIL) 200 mg tablet Take 1 tablet by mouth 2 (two) times a day    levETIRAcetam (KEPPRA) 500 mg tablet Take by mouth    loratadine (CLARITIN) 10 mg tablet Take by mouth    Multiple Vitamin-Folic Acid TABS Take by mouth    Omega-3 Fatty Acids (FISH OIL) 645 MG CAPS Take by mouth    simvastatin (ZOCOR) 10 mg tablet Take 1 tablet by mouth daily    vitamin E, tocopherol, (VITAMIN E-400) 400 units capsule Take by mouth     No current facility-administered medications on file prior to visit  No Known Allergies    Vitals:    01/14/20 1009   BP: 122/72   Pulse: 66   SpO2: 98%       Physical Exam   Constitutional: Oriented to person, place, and time  Well-developed and well-nourished, no apparent distress, non-toxic appearance  Cooperative, able to hear and answer questions without difficulty  Voice: Normal voice quality  Head: Normocephalic, atraumatic  No scars, masses or lesions  Face: Symmetric, no edema, no sinus tenderness  Eyes: Vision grossly intact, extra-ocular movement intact  Ears: External ears normal  Bilateral EAC completely impacted with cerumen  S/p debridement, tympanic membranes intact with intact normal landmarks  No post-auricular erythema or tenderness  Nose: Septum intact, nares clear  Mucosa moist, turbinates well appearing  No crusting, polyps or discharge evident  Oral cavity: Dentition intact  Mucosa moist, lips without lesions or masses  Tongue mobile, floor of mouth soft and flat  Hard palate intact  No masses or lesions  Oropharynx: Uvula is midline, soft palate intact without lesion or mass  Oropharyngeal inlet without obstruction  Tonsils unremarkable  Posterior pharyngeal wall clear  No masses or lesions  Salivary glands:  Parotid glands and submandibular glands symmetric, no enlargement or tenderness  Neck: Normal laryngeal elevation with swallow  Trachea midline  No masses or lesions  No palpable adenopathy  Thyroid: Without tenderness or palpable nodules  Pulmonary/Chest: Normal effort and rate  No respiratory distress  No stertor or stridor  Musculoskeletal: Normal range of motion  Neurological: Cranial nerves 2-12 intact  Skin: Skin is warm and dry  Psychiatric: Normal mood and affect  Procedure: Cerumen debridement bilateral    Indications: Cerumen impaction bilatearl    Procedure in detail: After informed verbal consent was obtained the ear was visualized using microscopy  Using cerumen loop, suction, and alligator forceps the cerumen was debrided and the findings below were seen  The patient tolerated the procedure well  FINDINGS: Bilateral TM intact and clear with normal internal landmarks  Imaging Studies: I have personally reviewed pertinent reports  Lab Results: I have personally reviewed pertinent lab results  Greater than 40 minutes were spent in consultation  More than 1/2 of that time was spent in counselling and coordination of care

## 2020-01-16 ENCOUNTER — TELEPHONE (OUTPATIENT)
Dept: UROLOGY | Facility: CLINIC | Age: 75
End: 2020-01-16

## 2020-01-16 NOTE — TELEPHONE ENCOUNTER
----- Message from Román Eason MD sent at 1/16/2020 11:31 AM EST -----  Please inform patient that the results are normal

## 2020-06-12 ENCOUNTER — TRANSCRIBE ORDERS (OUTPATIENT)
Dept: ADMINISTRATIVE | Facility: HOSPITAL | Age: 75
End: 2020-06-12

## 2020-06-12 ENCOUNTER — APPOINTMENT (OUTPATIENT)
Dept: LAB | Facility: MEDICAL CENTER | Age: 75
End: 2020-06-12
Payer: MEDICARE

## 2020-06-12 DIAGNOSIS — M06.9 RHEUMATOID ARTHRITIS, INVOLVING UNSPECIFIED SITE, UNSPECIFIED RHEUMATOID FACTOR PRESENCE: ICD-10-CM

## 2020-06-12 DIAGNOSIS — M06.9 RHEUMATOID ARTHRITIS, INVOLVING UNSPECIFIED SITE, UNSPECIFIED RHEUMATOID FACTOR PRESENCE: Primary | ICD-10-CM

## 2020-06-12 DIAGNOSIS — R97.20 ELEVATED PSA: ICD-10-CM

## 2020-06-12 LAB
ALBUMIN SERPL BCP-MCNC: 4 G/DL (ref 3.5–5)
ALP SERPL-CCNC: 79 U/L (ref 46–116)
ALT SERPL W P-5'-P-CCNC: 38 U/L (ref 12–78)
ANION GAP SERPL CALCULATED.3IONS-SCNC: 6 MMOL/L (ref 4–13)
AST SERPL W P-5'-P-CCNC: 27 U/L (ref 5–45)
BASOPHILS # BLD AUTO: 0.04 THOUSANDS/ΜL (ref 0–0.1)
BASOPHILS NFR BLD AUTO: 1 % (ref 0–1)
BILIRUB SERPL-MCNC: 0.62 MG/DL (ref 0.2–1)
BILIRUB UR QL STRIP: NEGATIVE
BUN SERPL-MCNC: 14 MG/DL (ref 5–25)
CALCIUM SERPL-MCNC: 8.9 MG/DL (ref 8.3–10.1)
CHLORIDE SERPL-SCNC: 109 MMOL/L (ref 100–108)
CLARITY UR: CLEAR
CO2 SERPL-SCNC: 26 MMOL/L (ref 21–32)
COLOR UR: YELLOW
CREAT SERPL-MCNC: 0.8 MG/DL (ref 0.6–1.3)
CRP SERPL QL: <3 MG/L
EOSINOPHIL # BLD AUTO: 0.1 THOUSAND/ΜL (ref 0–0.61)
EOSINOPHIL NFR BLD AUTO: 3 % (ref 0–6)
ERYTHROCYTE [DISTWIDTH] IN BLOOD BY AUTOMATED COUNT: 12.6 % (ref 11.6–15.1)
ERYTHROCYTE [SEDIMENTATION RATE] IN BLOOD: 9 MM/HOUR (ref 0–10)
GFR SERPL CREATININE-BSD FRML MDRD: 89 ML/MIN/1.73SQ M
GLUCOSE SERPL-MCNC: 94 MG/DL (ref 65–140)
GLUCOSE UR STRIP-MCNC: NEGATIVE MG/DL
HCT VFR BLD AUTO: 44.9 % (ref 36.5–49.3)
HGB BLD-MCNC: 15.1 G/DL (ref 12–17)
HGB UR QL STRIP.AUTO: NEGATIVE
IMM GRANULOCYTES # BLD AUTO: 0.01 THOUSAND/UL (ref 0–0.2)
IMM GRANULOCYTES NFR BLD AUTO: 0 % (ref 0–2)
KETONES UR STRIP-MCNC: NEGATIVE MG/DL
LEUKOCYTE ESTERASE UR QL STRIP: NEGATIVE
LYMPHOCYTES # BLD AUTO: 1.38 THOUSANDS/ΜL (ref 0.6–4.47)
LYMPHOCYTES NFR BLD AUTO: 41 % (ref 14–44)
MCH RBC QN AUTO: 35.5 PG (ref 26.8–34.3)
MCHC RBC AUTO-ENTMCNC: 33.6 G/DL (ref 31.4–37.4)
MCV RBC AUTO: 106 FL (ref 82–98)
MONOCYTES # BLD AUTO: 0.41 THOUSAND/ΜL (ref 0.17–1.22)
MONOCYTES NFR BLD AUTO: 12 % (ref 4–12)
NEUTROPHILS # BLD AUTO: 1.45 THOUSANDS/ΜL (ref 1.85–7.62)
NEUTS SEG NFR BLD AUTO: 43 % (ref 43–75)
NITRITE UR QL STRIP: NEGATIVE
NRBC BLD AUTO-RTO: 0 /100 WBCS
PH UR STRIP.AUTO: 6.5 [PH]
PLATELET # BLD AUTO: 199 THOUSANDS/UL (ref 149–390)
PMV BLD AUTO: 10.8 FL (ref 8.9–12.7)
POTASSIUM SERPL-SCNC: 3.9 MMOL/L (ref 3.5–5.3)
PROT SERPL-MCNC: 7.1 G/DL (ref 6.4–8.2)
PROT UR STRIP-MCNC: NEGATIVE MG/DL
PSA SERPL-MCNC: 7.4 NG/ML (ref 0–4)
RBC # BLD AUTO: 4.25 MILLION/UL (ref 3.88–5.62)
SODIUM SERPL-SCNC: 141 MMOL/L (ref 136–145)
SP GR UR STRIP.AUTO: 1.02 (ref 1–1.03)
UROBILINOGEN UR QL STRIP.AUTO: 0.2 E.U./DL
WBC # BLD AUTO: 3.39 THOUSAND/UL (ref 4.31–10.16)

## 2020-06-12 PROCEDURE — 36415 COLL VENOUS BLD VENIPUNCTURE: CPT

## 2020-06-12 PROCEDURE — 81003 URINALYSIS AUTO W/O SCOPE: CPT | Performed by: PHYSICIAN ASSISTANT

## 2020-06-12 PROCEDURE — 84153 ASSAY OF PSA TOTAL: CPT

## 2020-06-12 PROCEDURE — 85025 COMPLETE CBC W/AUTO DIFF WBC: CPT

## 2020-06-12 PROCEDURE — 85652 RBC SED RATE AUTOMATED: CPT

## 2020-06-12 PROCEDURE — 86140 C-REACTIVE PROTEIN: CPT

## 2020-06-12 PROCEDURE — 86430 RHEUMATOID FACTOR TEST QUAL: CPT

## 2020-06-12 PROCEDURE — 80053 COMPREHEN METABOLIC PANEL: CPT

## 2020-06-12 PROCEDURE — 86200 CCP ANTIBODY: CPT

## 2020-06-15 LAB — RHEUMATOID FACT SER QL LA: NEGATIVE

## 2020-06-16 LAB — CCP IGA+IGG SERPL IA-ACNC: 4 UNITS (ref 0–19)

## 2020-07-08 ENCOUNTER — TELEPHONE (OUTPATIENT)
Dept: UROLOGY | Facility: MEDICAL CENTER | Age: 75
End: 2020-07-08

## 2020-07-08 NOTE — TELEPHONE ENCOUNTER
Patient of Dr Jerrica Ruiz seen in the [facility] office  Patient is requesting that he be called with all recent results vs the office visits  Please call to advise if this is sufficient for this time  The appointment was not cancelled    Patient can be reached be [phone]  Thank you

## 2020-07-08 NOTE — TELEPHONE ENCOUNTER
As long as he has access to urologist through the [facility] I am okay with that  He should have a urologist check his PSA periodically because it is chronically elevated, and a urologist would need to make the decision whether to proceed with a biopsy or MRI if the PSA should rise

## 2020-07-08 NOTE — TELEPHONE ENCOUNTER
Called and spoke with patient  Patient stated that the [place] will follow with his PSA level  Patient does not wish to schedule appointment at this time

## 2020-07-08 NOTE — TELEPHONE ENCOUNTER
Called and spoke with patient  Informed patient of results  Patient stated that he does not feel like he needs to follow up with our office as he has PSA testing done through the [place]  Please advise if you would like patient to follow up with us or if [facility] can continue to manage patient

## 2020-10-13 ENCOUNTER — IMMUNIZATIONS (OUTPATIENT)
Dept: INTERNAL MEDICINE CLINIC | Facility: CLINIC | Age: 75
End: 2020-10-13
Payer: MEDICARE

## 2020-10-13 DIAGNOSIS — Z23 ENCOUNTER FOR IMMUNIZATION: ICD-10-CM

## 2020-10-13 PROCEDURE — 90662 IIV NO PRSV INCREASED AG IM: CPT

## 2020-10-13 PROCEDURE — G0008 ADMIN INFLUENZA VIRUS VAC: HCPCS

## 2020-11-05 ENCOUNTER — TRANSCRIBE ORDERS (OUTPATIENT)
Dept: LAB | Facility: MEDICAL CENTER | Age: 75
End: 2020-11-05

## 2020-11-05 DIAGNOSIS — M06.9 RHEUMATOID ARTHRITIS OF OTHER SITE, UNSPECIFIED WHETHER RHEUMATOID FACTOR PRESENT (HCC): ICD-10-CM

## 2020-11-05 DIAGNOSIS — E78.5 HYPERLIPIDEMIA, UNSPECIFIED HYPERLIPIDEMIA TYPE: ICD-10-CM

## 2020-11-05 DIAGNOSIS — E78.5 HYPERLIPIDEMIA, UNSPECIFIED HYPERLIPIDEMIA TYPE: Primary | ICD-10-CM

## 2020-11-05 DIAGNOSIS — G45.9 INTERMITTENT CEREBRAL ISCHEMIA: ICD-10-CM

## 2020-11-05 DIAGNOSIS — R97.20 ELEVATED PROSTATE SPECIFIC ANTIGEN (PSA): Primary | ICD-10-CM

## 2020-11-05 DIAGNOSIS — M06.9 RHEUMATOID ARTHRITIS, INVOLVING UNSPECIFIED SITE, UNSPECIFIED WHETHER RHEUMATOID FACTOR PRESENT (HCC): ICD-10-CM

## 2020-11-05 DIAGNOSIS — R97.20 ELEVATED PROSTATE SPECIFIC ANTIGEN (PSA): ICD-10-CM

## 2020-11-06 ENCOUNTER — APPOINTMENT (OUTPATIENT)
Dept: LAB | Facility: MEDICAL CENTER | Age: 75
End: 2020-11-06
Payer: MEDICARE

## 2020-11-16 ENCOUNTER — TELEPHONE (OUTPATIENT)
Dept: INTERNAL MEDICINE CLINIC | Facility: CLINIC | Age: 75
End: 2020-11-16

## 2020-11-16 ENCOUNTER — TELEMEDICINE (OUTPATIENT)
Dept: INTERNAL MEDICINE CLINIC | Facility: CLINIC | Age: 75
End: 2020-11-16
Payer: MEDICARE

## 2020-11-16 DIAGNOSIS — B34.9 VIRAL INFECTION, UNSPECIFIED: ICD-10-CM

## 2020-11-16 DIAGNOSIS — Z03.818 ENCOUNTER FOR OBSERVATION FOR SUSPECTED EXPOSURE TO OTHER BIOLOGICAL AGENTS RULED OUT: ICD-10-CM

## 2020-11-16 PROBLEM — E78.5 DYSLIPIDEMIA: Status: ACTIVE | Noted: 2018-01-27

## 2020-11-16 PROBLEM — H18.519 FUCHS' CORNEAL DYSTROPHY: Status: ACTIVE | Noted: 2020-11-16

## 2020-11-16 PROBLEM — I63.9 CEREBROVASCULAR ACCIDENT (HCC): Status: ACTIVE | Noted: 2018-01-27

## 2020-11-16 PROBLEM — J31.0 CHRONIC RHINITIS: Status: ACTIVE | Noted: 2020-11-16

## 2020-11-16 PROCEDURE — U0003 INFECTIOUS AGENT DETECTION BY NUCLEIC ACID (DNA OR RNA); SEVERE ACUTE RESPIRATORY SYNDROME CORONAVIRUS 2 (SARS-COV-2) (CORONAVIRUS DISEASE [COVID-19]), AMPLIFIED PROBE TECHNIQUE, MAKING USE OF HIGH THROUGHPUT TECHNOLOGIES AS DESCRIBED BY CMS-2020-01-R: HCPCS | Performed by: INTERNAL MEDICINE

## 2020-11-16 PROCEDURE — 99441 PR PHYS/QHP TELEPHONE EVALUATION 5-10 MIN: CPT | Performed by: INTERNAL MEDICINE

## 2020-11-17 ENCOUNTER — TELEMEDICINE (OUTPATIENT)
Dept: INTERNAL MEDICINE CLINIC | Facility: CLINIC | Age: 75
End: 2020-11-17
Payer: MEDICARE

## 2020-11-17 DIAGNOSIS — U07.1 COVID-19: Primary | ICD-10-CM

## 2020-11-17 LAB — SARS-COV-2 RNA SPEC QL NAA+PROBE: DETECTED

## 2020-11-17 PROCEDURE — 99441 PR PHYS/QHP TELEPHONE EVALUATION 5-10 MIN: CPT | Performed by: INTERNAL MEDICINE

## 2020-11-20 ENCOUNTER — TELEPHONE (OUTPATIENT)
Dept: INTERNAL MEDICINE CLINIC | Facility: CLINIC | Age: 75
End: 2020-11-20

## 2020-11-23 ENCOUNTER — TELEPHONE (OUTPATIENT)
Dept: FAMILY MEDICINE CLINIC | Facility: CLINIC | Age: 75
End: 2020-11-23

## 2020-11-24 ENCOUNTER — TELEPHONE (OUTPATIENT)
Dept: INTERNAL MEDICINE CLINIC | Facility: CLINIC | Age: 75
End: 2020-11-24

## 2020-11-27 ENCOUNTER — TELEPHONE (OUTPATIENT)
Dept: INTERNAL MEDICINE CLINIC | Facility: CLINIC | Age: 75
End: 2020-11-27

## 2020-12-01 ENCOUNTER — TELEPHONE (OUTPATIENT)
Dept: INTERNAL MEDICINE CLINIC | Facility: CLINIC | Age: 75
End: 2020-12-01

## 2020-12-08 ENCOUNTER — OFFICE VISIT (OUTPATIENT)
Dept: OTOLARYNGOLOGY | Facility: CLINIC | Age: 75
End: 2020-12-08
Payer: MEDICARE

## 2020-12-08 VITALS
SYSTOLIC BLOOD PRESSURE: 110 MMHG | WEIGHT: 212 LBS | HEIGHT: 72 IN | OXYGEN SATURATION: 97 % | HEART RATE: 63 BPM | TEMPERATURE: 97.5 F | BODY MASS INDEX: 28.71 KG/M2 | DIASTOLIC BLOOD PRESSURE: 78 MMHG

## 2020-12-08 DIAGNOSIS — H61.23 BILATERAL IMPACTED CERUMEN: Primary | ICD-10-CM

## 2020-12-08 DIAGNOSIS — H90.3 SENSORINEURAL HEARING LOSS (SNHL) OF BOTH EARS: ICD-10-CM

## 2020-12-08 PROCEDURE — 69210 REMOVE IMPACTED EAR WAX UNI: CPT | Performed by: OTOLARYNGOLOGY

## 2020-12-08 PROCEDURE — 99213 OFFICE O/P EST LOW 20 MIN: CPT | Performed by: OTOLARYNGOLOGY

## 2020-12-31 ENCOUNTER — OFFICE VISIT (OUTPATIENT)
Dept: INTERNAL MEDICINE CLINIC | Facility: CLINIC | Age: 75
End: 2020-12-31
Payer: MEDICARE

## 2020-12-31 VITALS
HEART RATE: 75 BPM | HEIGHT: 72 IN | BODY MASS INDEX: 29.53 KG/M2 | OXYGEN SATURATION: 97 % | TEMPERATURE: 97.2 F | WEIGHT: 218 LBS

## 2020-12-31 DIAGNOSIS — G40.909 SEIZURE DISORDER (HCC): ICD-10-CM

## 2020-12-31 DIAGNOSIS — Z12.11 COLON CANCER SCREENING: ICD-10-CM

## 2020-12-31 DIAGNOSIS — M06.09 RHEUMATOID ARTHRITIS OF MULTIPLE SITES WITH NEGATIVE RHEUMATOID FACTOR (HCC): ICD-10-CM

## 2020-12-31 DIAGNOSIS — Z00.00 MEDICARE ANNUAL WELLNESS VISIT, SUBSEQUENT: Primary | ICD-10-CM

## 2020-12-31 PROCEDURE — G0438 PPPS, INITIAL VISIT: HCPCS | Performed by: INTERNAL MEDICINE

## 2021-01-04 ENCOUNTER — TELEPHONE (OUTPATIENT)
Dept: INTERNAL MEDICINE CLINIC | Facility: CLINIC | Age: 76
End: 2021-01-04

## 2021-01-04 ENCOUNTER — CLINICAL SUPPORT (OUTPATIENT)
Dept: INTERNAL MEDICINE CLINIC | Facility: CLINIC | Age: 76
End: 2021-01-04
Payer: MEDICARE

## 2021-01-04 DIAGNOSIS — Z11.1 PPD SCREENING TEST: Primary | ICD-10-CM

## 2021-01-04 DIAGNOSIS — Z11.1 ENCOUNTER FOR PPD TEST: Primary | ICD-10-CM

## 2021-01-04 DIAGNOSIS — M79.18 INTERCOSTAL MUSCLE PAIN: Primary | ICD-10-CM

## 2021-01-04 PROCEDURE — 86580 TB INTRADERMAL TEST: CPT | Performed by: INTERNAL MEDICINE

## 2021-01-04 NOTE — TELEPHONE ENCOUNTER
Patient asked if his nurse visit Wednesday can be changed to see the doctor at that appt? He has issues he would like to discuss with her when he comes in

## 2021-01-04 NOTE — TELEPHONE ENCOUNTER
Should get ekg to be complete He can use Tylenol if feels muscular but if ekg abnormal will need other tests

## 2021-01-05 ENCOUNTER — HOSPITAL ENCOUNTER (OUTPATIENT)
Dept: NON INVASIVE DIAGNOSTICS | Facility: HOSPITAL | Age: 76
Discharge: HOME | End: 2021-01-05
Attending: INTERNAL MEDICINE
Payer: MEDICARE

## 2021-01-05 DIAGNOSIS — M79.18 INTERCOSTAL MUSCLE PAIN: ICD-10-CM

## 2021-01-05 LAB
ATRIAL RATE: 65 BPM
P AXIS: 65 DEGREES
PR INTERVAL: 168 MS
QRS AXIS: 56 DEGREES
QRSD INTERVAL: 80 MS
QT INTERVAL: 416 MS
QTC INTERVAL: 432 MS
T WAVE AXIS: 31 DEGREES
VENTRICULAR RATE: 65 BPM

## 2021-01-05 PROCEDURE — 93005 ELECTROCARDIOGRAM TRACING: CPT

## 2021-01-05 PROCEDURE — 93010 ELECTROCARDIOGRAM REPORT: CPT | Performed by: INTERNAL MEDICINE

## 2021-01-06 ENCOUNTER — CLINICAL SUPPORT (OUTPATIENT)
Dept: INTERNAL MEDICINE CLINIC | Facility: CLINIC | Age: 76
End: 2021-01-06

## 2021-01-06 DIAGNOSIS — Z11.1 ENCOUNTER FOR PPD SKIN TEST READING: Primary | ICD-10-CM

## 2021-01-06 LAB
INDURATION: 0 MM
TB SKIN TEST: NEGATIVE

## 2021-01-07 ENCOUNTER — TELEPHONE (OUTPATIENT)
Dept: INTERNAL MEDICINE CLINIC | Facility: CLINIC | Age: 76
End: 2021-01-07

## 2021-01-07 NOTE — TELEPHONE ENCOUNTER
ekg normal and no change from prior 2015  If he has continued chest symptoms would recommend exercise stress test to be complete but appears symptoms may be muscular not cardiac

## 2021-01-19 ENCOUNTER — TELEPHONE (OUTPATIENT)
Dept: INTERNAL MEDICINE CLINIC | Facility: CLINIC | Age: 76
End: 2021-01-19

## 2021-01-19 NOTE — TELEPHONE ENCOUNTER
Phoned patient with positive Cologuard results  Patient states it was due to his diet and was constipated at the time of the test   States he had some blood in his stool from straining to pass  Patient doesn't think he needs referral at this time?

## 2021-01-19 NOTE — TELEPHONE ENCOUNTER
If has blood in his stool he should get evaluated   Perhaps send a letter stating recent results positive and recommend followup and provide the information to schedule if he was not agreeable when you informed him by phone

## 2021-03-09 DIAGNOSIS — Z23 ENCOUNTER FOR IMMUNIZATION: ICD-10-CM

## 2021-03-30 ENCOUNTER — OFFICE VISIT (OUTPATIENT)
Dept: AUDIOLOGY | Facility: CLINIC | Age: 76
End: 2021-03-30

## 2021-03-30 DIAGNOSIS — H90.3 SENSORY HEARING LOSS, BILATERAL: Primary | ICD-10-CM

## 2021-03-30 NOTE — PROGRESS NOTES
Progress Note    Name:  Lorraine Bernard  :  1945  Age:  70 y o  Date of Evaluation: 21     Walk in  Daniel Robbins currently wears a [employer] BI-CROS system provided by the [place]   was broken today, $100 00 quoted for a new one  Daniel Robbins reported he will wait until he can get an appointment at the [place] in [place]  We will contact Daniel Robbins if we become established with the [place]         Tessy Barlow   Clinical Audiologist

## 2021-05-12 ENCOUNTER — TRANSCRIBE ORDERS (OUTPATIENT)
Dept: LAB | Facility: MEDICAL CENTER | Age: 76
End: 2021-05-12

## 2021-05-12 DIAGNOSIS — E78.5 HYPERLIPIDEMIA, UNSPECIFIED HYPERLIPIDEMIA TYPE: Primary | ICD-10-CM

## 2021-06-08 ENCOUNTER — OFFICE VISIT (OUTPATIENT)
Dept: OTOLARYNGOLOGY | Facility: CLINIC | Age: 76
End: 2021-06-08
Payer: MEDICARE

## 2021-06-08 VITALS
HEART RATE: 74 BPM | SYSTOLIC BLOOD PRESSURE: 100 MMHG | DIASTOLIC BLOOD PRESSURE: 72 MMHG | WEIGHT: 216 LBS | TEMPERATURE: 97.6 F | OXYGEN SATURATION: 97 % | BODY MASS INDEX: 29.26 KG/M2 | HEIGHT: 72 IN

## 2021-06-08 DIAGNOSIS — K21.9 LARYNGOPHARYNGEAL REFLUX (LPR): ICD-10-CM

## 2021-06-08 DIAGNOSIS — H90.3 SENSORINEURAL HEARING LOSS (SNHL) OF BOTH EARS: ICD-10-CM

## 2021-06-08 DIAGNOSIS — H61.23 BILATERAL IMPACTED CERUMEN: Primary | ICD-10-CM

## 2021-06-08 PROCEDURE — 69210 REMOVE IMPACTED EAR WAX UNI: CPT | Performed by: OTOLARYNGOLOGY

## 2021-06-08 PROCEDURE — 99214 OFFICE O/P EST MOD 30 MIN: CPT | Performed by: OTOLARYNGOLOGY

## 2021-06-08 NOTE — PROGRESS NOTES
Corey Ernandez is a 70 y o male who presents for re-evaluation of blocked ears  He was last seen in December for bilateral cerumen debridement  Has been doing well since then  Continues to use bilateral hearing aids through the [facility]  Also notes some throat clearing/PND  He was told to use omeprazole BID which he tried  Noticed improvement in symptoms while on omeprazole  Tried to wean off but symptoms seemed to return  Past Medical History:   Diagnosis Date    Arthritis     Concussion     Elevated PSA     Hyperlipidemia     Seizure ([address] )     TIA (transient ischemic attack)     Traumatic [facility] ([facility] [address] )        /72 (BP Location: Left arm, Cuff Size: Standard)   Pulse 74   Temp 97 6 °F (36 4 °C) (Temporal)   Ht 6' (1 829 m)   Wt 98 kg (216 lb)   SpO2 97%   BMI 29 29 kg/m²       Physical Exam   Constitutional: Oriented to person, place, and time  Well-developed and well-nourished, no apparent distress, non-toxic appearance  Cooperative, able to hear and answer questions without difficulty  Voice: Normal voice quality  Head: Normocephalic, atraumatic  No scars, masses or lesions  Face: Symmetric, no edema, no sinus tenderness  Eyes: Vision grossly intact, extra-ocular movement intact  Ears: External ear normal  Bilateral EAC impacted with cerumen  S/p debridement, bilateral tympanic membranes are intact with intact normal landmarks  No post-auricular erythema or tenderness  Nose: Septum midline, nares clear  Mucosa moist, turbinates well appearing  No crusting, polyps or discharge evident  Oral cavity: Dentition intact  Mucosa moist, lips normal   Tongue mobile, floor of mouth normal   Hard palate unremarkable  No masses or lesions  Oropharynx: Uvula is midline, soft palate normal   Unremarkable oropharyngeal inlet  Tonsils unremarkable  Posterior pharyngeal wall clear  No masses or lesions    Salivary glands:  Parotid glands and submandibular glands symmetric, no enlargement or tenderness  Neck: Normal laryngeal elevation with swallow  Trachea midline  No masses or lesions  No palpable adenopathy  Thyroid: normal in size, unremarkable without tenderness or palpable nodules  Pulmonary/Chest: Normal effort and rate  No respiratory distress  Musculoskeletal: Normal range of motion  Neurological: Cranial nerves 2-12 intact  Skin: Skin is warm and dry  Psychiatric: Normal mood and affect  Procedure: Cerumen debridement bilateral    Indications: Cerumen impaction bilatearl    Procedure in detail: After informed verbal consent was obtained the ear was visualized using microscopy  Using cerumen loop, suction, and alligator forceps the cerumen was debrided and the findings below were seen  The patient tolerated the procedure well  FINDINGS: Bilateral TM intact and clear  A/P: Cerumen impaction:  We discussed the nature of cerumen impaction  We discussed appropriate treat with hydrogen peroxide 4-5 drops once per week  We discussed discontinuing the use of any Q-Tips or other objects into the ear  [facility]  He had a prior workup done at [facility] for asymmetric hearing loss  Feels hearing has remained largely stable  Laryngopharyngeal reflux: We discussed the ongoing findings of laryngopharyngeal reflux  We discussed the management of laryngopharyngeal reflux with PPI taken 30 minutes before the evening meal, elevation the head of bed, diet modifications, weight loss, and other lifestyle changes to assist with decreasing laryngopharyngeal reflux  Follow up in 6 months, sooner with additional concerns

## 2021-08-19 ENCOUNTER — APPOINTMENT (OUTPATIENT)
Dept: LAB | Facility: MEDICAL CENTER | Age: 76
End: 2021-08-19
Payer: MEDICARE

## 2021-08-19 DIAGNOSIS — M06.09 RHEUMATOID ARTHRITIS OF MULTIPLE SITES WITHOUT RHEUMATOID FACTOR (HCC): ICD-10-CM

## 2021-08-19 DIAGNOSIS — E78.5 HYPERLIPIDEMIA, UNSPECIFIED HYPERLIPIDEMIA TYPE: ICD-10-CM

## 2021-08-19 LAB
25(OH)D3 SERPL-MCNC: 43.1 NG/ML (ref 30–100)
ALBUMIN SERPL BCP-MCNC: 3.7 G/DL (ref 3.5–5)
ALP SERPL-CCNC: 79 U/L (ref 46–116)
ALT SERPL W P-5'-P-CCNC: 38 U/L (ref 12–78)
ANION GAP SERPL CALCULATED.3IONS-SCNC: 5 MMOL/L (ref 4–13)
AST SERPL W P-5'-P-CCNC: 27 U/L (ref 5–45)
BASOPHILS # BLD AUTO: 0.03 THOUSANDS/ΜL (ref 0–0.1)
BASOPHILS NFR BLD AUTO: 1 % (ref 0–1)
BILIRUB SERPL-MCNC: 0.57 MG/DL (ref 0.2–1)
BUN SERPL-MCNC: 16 MG/DL (ref 5–25)
CALCIUM SERPL-MCNC: 9 MG/DL (ref 8.3–10.1)
CHLORIDE SERPL-SCNC: 113 MMOL/L (ref 100–108)
CHOLEST SERPL-MCNC: 160 MG/DL (ref 50–200)
CO2 SERPL-SCNC: 23 MMOL/L (ref 21–32)
CREAT SERPL-MCNC: 0.85 MG/DL (ref 0.6–1.3)
CRP SERPL QL: <3 MG/L
EOSINOPHIL # BLD AUTO: 0.09 THOUSAND/ΜL (ref 0–0.61)
EOSINOPHIL NFR BLD AUTO: 2 % (ref 0–6)
ERYTHROCYTE [DISTWIDTH] IN BLOOD BY AUTOMATED COUNT: 12.4 % (ref 11.6–15.1)
ERYTHROCYTE [SEDIMENTATION RATE] IN BLOOD: 8 MM/HOUR (ref 0–19)
GFR SERPL CREATININE-BSD FRML MDRD: 86 ML/MIN/1.73SQ M
GLUCOSE SERPL-MCNC: 137 MG/DL (ref 65–140)
HCT VFR BLD AUTO: 43.8 % (ref 36.5–49.3)
HDLC SERPL-MCNC: 51 MG/DL
HGB BLD-MCNC: 15 G/DL (ref 12–17)
IMM GRANULOCYTES # BLD AUTO: 0.02 THOUSAND/UL (ref 0–0.2)
IMM GRANULOCYTES NFR BLD AUTO: 1 % (ref 0–2)
LDLC SERPL CALC-MCNC: 81 MG/DL (ref 0–100)
LYMPHOCYTES # BLD AUTO: 1.67 THOUSANDS/ΜL (ref 0.6–4.47)
LYMPHOCYTES NFR BLD AUTO: 43 % (ref 14–44)
MCH RBC QN AUTO: 35.5 PG (ref 26.8–34.3)
MCHC RBC AUTO-ENTMCNC: 34.2 G/DL (ref 31.4–37.4)
MCV RBC AUTO: 104 FL (ref 82–98)
MONOCYTES # BLD AUTO: 0.46 THOUSAND/ΜL (ref 0.17–1.22)
MONOCYTES NFR BLD AUTO: 12 % (ref 4–12)
NEUTROPHILS # BLD AUTO: 1.57 THOUSANDS/ΜL (ref 1.85–7.62)
NEUTS SEG NFR BLD AUTO: 41 % (ref 43–75)
NONHDLC SERPL-MCNC: 109 MG/DL
NRBC BLD AUTO-RTO: 0 /100 WBCS
PLATELET # BLD AUTO: 186 THOUSANDS/UL (ref 149–390)
PMV BLD AUTO: 10.8 FL (ref 8.9–12.7)
POTASSIUM SERPL-SCNC: 3.8 MMOL/L (ref 3.5–5.3)
PROT SERPL-MCNC: 7.1 G/DL (ref 6.4–8.2)
RBC # BLD AUTO: 4.22 MILLION/UL (ref 3.88–5.62)
SODIUM SERPL-SCNC: 141 MMOL/L (ref 136–145)
TRIGL SERPL-MCNC: 142 MG/DL
TSH SERPL DL<=0.05 MIU/L-ACNC: 0.86 UIU/ML (ref 0.36–3.74)
VIT B12 SERPL-MCNC: 747 PG/ML (ref 100–900)
WBC # BLD AUTO: 3.84 THOUSAND/UL (ref 4.31–10.16)

## 2021-08-19 PROCEDURE — 80177 DRUG SCRN QUAN LEVETIRACETAM: CPT

## 2021-08-19 PROCEDURE — 80053 COMPREHEN METABOLIC PANEL: CPT

## 2021-08-19 PROCEDURE — 36415 COLL VENOUS BLD VENIPUNCTURE: CPT

## 2021-08-19 PROCEDURE — 84443 ASSAY THYROID STIM HORMONE: CPT

## 2021-08-19 PROCEDURE — 80061 LIPID PANEL: CPT

## 2021-08-19 PROCEDURE — 82306 VITAMIN D 25 HYDROXY: CPT

## 2021-08-19 PROCEDURE — 82607 VITAMIN B-12: CPT

## 2021-08-19 PROCEDURE — 85025 COMPLETE CBC W/AUTO DIFF WBC: CPT

## 2021-08-19 PROCEDURE — 85652 RBC SED RATE AUTOMATED: CPT

## 2021-08-19 PROCEDURE — 86140 C-REACTIVE PROTEIN: CPT

## 2021-08-23 LAB — LEVETIRACETAM SERPL-MCNC: 2.8 UG/ML (ref 10–40)

## 2021-09-15 ENCOUNTER — TELEPHONE (OUTPATIENT)
Dept: UROLOGY | Facility: AMBULATORY SURGERY CENTER | Age: 76
End: 2021-09-15

## 2021-09-15 NOTE — TELEPHONE ENCOUNTER
Will await results of recent PSA  He was last seen in the office in January 2020  Patient has chronically elevated PSA and has undergone negative prostate biopsy and multiparametric MRI of prostate x2  Last MRI was performed in 2018  Is he still following with urologist at [place]? If not, please assist with scheduling office visit

## 2021-09-15 NOTE — TELEPHONE ENCOUNTER
Patient managed Dr Kendra Holcomb [facility]) patient called in stating he got his psa done at the [place] yesterday and the result came back a 11  Patient stated he will have the results faxed to [phone]  Patient asked if Dr Kendra Holcomb can take a look at the results

## 2021-09-16 NOTE — TELEPHONE ENCOUNTER
Vm left for pt to call back and review if he if following up with our office or [facility]    Office number left

## 2021-09-17 NOTE — TELEPHONE ENCOUNTER
Called and spoke with patient  He was scheduled for a follow up with Cari Armstrong on 9/20/2021 @ 3155 in the [facility]

## 2021-09-17 NOTE — TELEPHONE ENCOUNTER
PT Luis Amaral managed Dr Didier Beck at [facility]    PT is calling back from  he received to review  He sees us for his Urologist   His PCP is via the [place]  He can be reached at [phone]  He is available all afternoon

## 2021-09-22 ENCOUNTER — HOSPITAL ENCOUNTER (OUTPATIENT)
Dept: NON INVASIVE DIAGNOSTICS | Facility: HOSPITAL | Age: 76
Discharge: HOME | End: 2021-09-22
Payer: MEDICARE

## 2021-09-22 DIAGNOSIS — R01.1 HEART MURMUR: ICD-10-CM

## 2021-09-22 PROCEDURE — 93306 TTE W/DOPPLER COMPLETE: CPT

## 2021-09-22 PROCEDURE — 93306 TTE W/DOPPLER COMPLETE: CPT | Performed by: INTERNAL MEDICINE

## 2021-10-14 ENCOUNTER — APPOINTMENT (OUTPATIENT)
Dept: LAB | Facility: MEDICAL CENTER | Age: 76
End: 2021-10-14
Payer: MEDICARE

## 2021-10-14 DIAGNOSIS — R56.9 SEIZURE (HCC): ICD-10-CM

## 2021-10-14 DIAGNOSIS — E78.5 HYPERLIPIDEMIA, UNSPECIFIED HYPERLIPIDEMIA TYPE: ICD-10-CM

## 2021-10-14 LAB
25(OH)D3 SERPL-MCNC: 39.5 NG/ML (ref 30–100)
ALBUMIN SERPL BCP-MCNC: 3.7 G/DL (ref 3.5–5)
ALP SERPL-CCNC: 74 U/L (ref 46–116)
ALT SERPL W P-5'-P-CCNC: 39 U/L (ref 12–78)
ANION GAP SERPL CALCULATED.3IONS-SCNC: 2 MMOL/L (ref 4–13)
AST SERPL W P-5'-P-CCNC: 26 U/L (ref 5–45)
BASOPHILS # BLD AUTO: 0.04 THOUSANDS/ΜL (ref 0–0.1)
BASOPHILS NFR BLD AUTO: 1 % (ref 0–1)
BILIRUB DIRECT SERPL-MCNC: 0.2 MG/DL (ref 0–0.2)
BILIRUB SERPL-MCNC: 0.71 MG/DL (ref 0.2–1)
BUN SERPL-MCNC: 19 MG/DL (ref 5–25)
CALCIUM SERPL-MCNC: 9.2 MG/DL (ref 8.3–10.1)
CHLORIDE SERPL-SCNC: 112 MMOL/L (ref 100–108)
CHOLEST SERPL-MCNC: 169 MG/DL (ref 50–200)
CO2 SERPL-SCNC: 27 MMOL/L (ref 21–32)
CREAT SERPL-MCNC: 0.78 MG/DL (ref 0.6–1.3)
EOSINOPHIL # BLD AUTO: 0.08 THOUSAND/ΜL (ref 0–0.61)
EOSINOPHIL NFR BLD AUTO: 2 % (ref 0–6)
ERYTHROCYTE [DISTWIDTH] IN BLOOD BY AUTOMATED COUNT: 12.9 % (ref 11.6–15.1)
GFR SERPL CREATININE-BSD FRML MDRD: 89 ML/MIN/1.73SQ M
GLUCOSE P FAST SERPL-MCNC: 103 MG/DL (ref 65–99)
HCT VFR BLD AUTO: 45 % (ref 36.5–49.3)
HDLC SERPL-MCNC: 59 MG/DL
HGB BLD-MCNC: 15.3 G/DL (ref 12–17)
IMM GRANULOCYTES # BLD AUTO: 0.01 THOUSAND/UL (ref 0–0.2)
IMM GRANULOCYTES NFR BLD AUTO: 0 % (ref 0–2)
LDLC SERPL CALC-MCNC: 85 MG/DL (ref 0–100)
LYMPHOCYTES # BLD AUTO: 1.54 THOUSANDS/ΜL (ref 0.6–4.47)
LYMPHOCYTES NFR BLD AUTO: 38 % (ref 14–44)
MCH RBC QN AUTO: 35.1 PG (ref 26.8–34.3)
MCHC RBC AUTO-ENTMCNC: 34 G/DL (ref 31.4–37.4)
MCV RBC AUTO: 103 FL (ref 82–98)
MONOCYTES # BLD AUTO: 0.44 THOUSAND/ΜL (ref 0.17–1.22)
MONOCYTES NFR BLD AUTO: 11 % (ref 4–12)
NEUTROPHILS # BLD AUTO: 1.9 THOUSANDS/ΜL (ref 1.85–7.62)
NEUTS SEG NFR BLD AUTO: 48 % (ref 43–75)
NONHDLC SERPL-MCNC: 110 MG/DL
NRBC BLD AUTO-RTO: 0 /100 WBCS
PLATELET # BLD AUTO: 169 THOUSANDS/UL (ref 149–390)
PMV BLD AUTO: 10.7 FL (ref 8.9–12.7)
POTASSIUM SERPL-SCNC: 4 MMOL/L (ref 3.5–5.3)
PROT SERPL-MCNC: 7.1 G/DL (ref 6.4–8.2)
RBC # BLD AUTO: 4.36 MILLION/UL (ref 3.88–5.62)
SODIUM SERPL-SCNC: 141 MMOL/L (ref 136–145)
TRIGL SERPL-MCNC: 123 MG/DL
TSH SERPL DL<=0.05 MIU/L-ACNC: 0.75 UIU/ML (ref 0.36–3.74)
VIT B12 SERPL-MCNC: 764 PG/ML (ref 100–900)
WBC # BLD AUTO: 4.01 THOUSAND/UL (ref 4.31–10.16)

## 2021-10-14 PROCEDURE — 36415 COLL VENOUS BLD VENIPUNCTURE: CPT

## 2021-10-14 PROCEDURE — 80053 COMPREHEN METABOLIC PANEL: CPT

## 2021-10-14 PROCEDURE — 80061 LIPID PANEL: CPT

## 2021-10-14 PROCEDURE — 84443 ASSAY THYROID STIM HORMONE: CPT

## 2021-10-14 PROCEDURE — 82607 VITAMIN B-12: CPT

## 2021-10-14 PROCEDURE — 82306 VITAMIN D 25 HYDROXY: CPT

## 2021-10-14 PROCEDURE — 80177 DRUG SCRN QUAN LEVETIRACETAM: CPT

## 2021-10-14 PROCEDURE — 85025 COMPLETE CBC W/AUTO DIFF WBC: CPT

## 2021-10-14 PROCEDURE — 82248 BILIRUBIN DIRECT: CPT

## 2021-10-18 ENCOUNTER — HOSPITAL ENCOUNTER (EMERGENCY)
Facility: HOSPITAL | Age: 76
Discharge: LEFT BEFORE DISCHARGE | End: 2021-10-18
Attending: EMERGENCY MEDICINE | Admitting: INTERNAL MEDICINE
Payer: MEDICARE

## 2021-10-18 ENCOUNTER — APPOINTMENT (EMERGENCY)
Dept: RADIOLOGY | Facility: HOSPITAL | Age: 76
End: 2021-10-18
Payer: MEDICARE

## 2021-10-18 VITALS
SYSTOLIC BLOOD PRESSURE: 124 MMHG | HEART RATE: 67 BPM | RESPIRATION RATE: 17 BRPM | OXYGEN SATURATION: 93 % | TEMPERATURE: 95.8 F | DIASTOLIC BLOOD PRESSURE: 66 MMHG

## 2021-10-18 DIAGNOSIS — R07.9 CHEST PAIN: Primary | ICD-10-CM

## 2021-10-18 DIAGNOSIS — I35.0 AORTIC STENOSIS, SEVERE: ICD-10-CM

## 2021-10-18 LAB
ALBUMIN SERPL BCP-MCNC: 4.1 G/DL (ref 3.5–5)
ALP SERPL-CCNC: 82 U/L (ref 46–116)
ALT SERPL W P-5'-P-CCNC: 44 U/L (ref 12–78)
ANION GAP SERPL CALCULATED.3IONS-SCNC: 6 MMOL/L (ref 4–13)
AST SERPL W P-5'-P-CCNC: 31 U/L (ref 5–45)
BASOPHILS # BLD AUTO: 0.04 THOUSANDS/ΜL (ref 0–0.1)
BASOPHILS NFR BLD AUTO: 1 % (ref 0–1)
BILIRUB SERPL-MCNC: 0.53 MG/DL (ref 0.2–1)
BUN SERPL-MCNC: 15 MG/DL (ref 5–25)
CALCIUM SERPL-MCNC: 8.8 MG/DL (ref 8.3–10.1)
CHLORIDE SERPL-SCNC: 106 MMOL/L (ref 100–108)
CO2 SERPL-SCNC: 29 MMOL/L (ref 21–32)
CREAT SERPL-MCNC: 0.83 MG/DL (ref 0.6–1.3)
D DIMER PPP FEU-MCNC: 0.46 UG/ML FEU
EOSINOPHIL # BLD AUTO: 0.11 THOUSAND/ΜL (ref 0–0.61)
EOSINOPHIL NFR BLD AUTO: 2 % (ref 0–6)
ERYTHROCYTE [DISTWIDTH] IN BLOOD BY AUTOMATED COUNT: 12.9 % (ref 11.6–15.1)
FLUAV RNA RESP QL NAA+PROBE: NEGATIVE
FLUBV RNA RESP QL NAA+PROBE: NEGATIVE
GFR SERPL CREATININE-BSD FRML MDRD: 87 ML/MIN/1.73SQ M
GLUCOSE SERPL-MCNC: 92 MG/DL (ref 65–140)
HCT VFR BLD AUTO: 44.5 % (ref 36.5–49.3)
HGB BLD-MCNC: 15.2 G/DL (ref 12–17)
IMM GRANULOCYTES # BLD AUTO: 0.01 THOUSAND/UL (ref 0–0.2)
IMM GRANULOCYTES NFR BLD AUTO: 0 % (ref 0–2)
LYMPHOCYTES # BLD AUTO: 2.09 THOUSANDS/ΜL (ref 0.6–4.47)
LYMPHOCYTES NFR BLD AUTO: 41 % (ref 14–44)
MCH RBC QN AUTO: 35.7 PG (ref 26.8–34.3)
MCHC RBC AUTO-ENTMCNC: 34.2 G/DL (ref 31.4–37.4)
MCV RBC AUTO: 105 FL (ref 82–98)
MONOCYTES # BLD AUTO: 0.58 THOUSAND/ΜL (ref 0.17–1.22)
MONOCYTES NFR BLD AUTO: 11 % (ref 4–12)
NEUTROPHILS # BLD AUTO: 2.29 THOUSANDS/ΜL (ref 1.85–7.62)
NEUTS SEG NFR BLD AUTO: 45 % (ref 43–75)
NRBC BLD AUTO-RTO: 0 /100 WBCS
PLATELET # BLD AUTO: 180 THOUSANDS/UL (ref 149–390)
PMV BLD AUTO: 10.1 FL (ref 8.9–12.7)
POTASSIUM SERPL-SCNC: 3.8 MMOL/L (ref 3.5–5.3)
PROT SERPL-MCNC: 7.1 G/DL (ref 6.4–8.2)
RBC # BLD AUTO: 4.26 MILLION/UL (ref 3.88–5.62)
RSV RNA RESP QL NAA+PROBE: NEGATIVE
SARS-COV-2 RNA RESP QL NAA+PROBE: NEGATIVE
SODIUM SERPL-SCNC: 141 MMOL/L (ref 136–145)
TROPONIN I SERPL-MCNC: <0.02 NG/ML
TROPONIN I SERPL-MCNC: <0.02 NG/ML
WBC # BLD AUTO: 5.12 THOUSAND/UL (ref 4.31–10.16)

## 2021-10-18 PROCEDURE — 36415 COLL VENOUS BLD VENIPUNCTURE: CPT

## 2021-10-18 PROCEDURE — 93005 ELECTROCARDIOGRAM TRACING: CPT

## 2021-10-18 PROCEDURE — 99285 EMERGENCY DEPT VISIT HI MDM: CPT

## 2021-10-18 PROCEDURE — 85025 COMPLETE CBC W/AUTO DIFF WBC: CPT | Performed by: EMERGENCY MEDICINE

## 2021-10-18 PROCEDURE — 80053 COMPREHEN METABOLIC PANEL: CPT | Performed by: EMERGENCY MEDICINE

## 2021-10-18 PROCEDURE — 85379 FIBRIN DEGRADATION QUANT: CPT | Performed by: EMERGENCY MEDICINE

## 2021-10-18 PROCEDURE — 0241U HB NFCT DS VIR RESP RNA 4 TRGT: CPT | Performed by: INTERNAL MEDICINE

## 2021-10-18 PROCEDURE — 71045 X-RAY EXAM CHEST 1 VIEW: CPT

## 2021-10-18 PROCEDURE — 99285 EMERGENCY DEPT VISIT HI MDM: CPT | Performed by: EMERGENCY MEDICINE

## 2021-10-18 PROCEDURE — 84484 ASSAY OF TROPONIN QUANT: CPT | Performed by: EMERGENCY MEDICINE

## 2021-10-18 RX ORDER — ASPIRIN 81 MG/1
324 TABLET, CHEWABLE ORAL ONCE
Status: COMPLETED | OUTPATIENT
Start: 2021-10-18 | End: 2021-10-18

## 2021-10-18 RX ADMIN — ASPIRIN 81 MG CHEWABLE TABLET 324 MG: 81 TABLET CHEWABLE at 15:40

## 2021-10-19 ENCOUNTER — TRANSITIONAL CARE MANAGEMENT (OUTPATIENT)
Dept: FAMILY MEDICINE CLINIC | Facility: CLINIC | Age: 76
End: 2021-10-19

## 2021-10-19 LAB — LEVETIRACETAM SERPL-MCNC: <1 UG/ML (ref 10–40)

## 2021-10-21 LAB
ATRIAL RATE: 58 BPM
ATRIAL RATE: 63 BPM
P AXIS: 60 DEGREES
P AXIS: 62 DEGREES
PR INTERVAL: 174 MS
PR INTERVAL: 176 MS
QRS AXIS: 45 DEGREES
QRS AXIS: 46 DEGREES
QRSD INTERVAL: 80 MS
QRSD INTERVAL: 82 MS
QT INTERVAL: 426 MS
QT INTERVAL: 438 MS
QTC INTERVAL: 429 MS
QTC INTERVAL: 435 MS
T WAVE AXIS: 31 DEGREES
T WAVE AXIS: 32 DEGREES
VENTRICULAR RATE: 58 BPM
VENTRICULAR RATE: 63 BPM

## 2021-10-21 PROCEDURE — 93010 ELECTROCARDIOGRAM REPORT: CPT | Performed by: INTERNAL MEDICINE

## 2021-10-27 ENCOUNTER — OFFICE VISIT (OUTPATIENT)
Dept: FAMILY MEDICINE CLINIC | Facility: CLINIC | Age: 76
End: 2021-10-27
Payer: MEDICARE

## 2021-10-27 VITALS
RESPIRATION RATE: 18 BRPM | HEIGHT: 72 IN | BODY MASS INDEX: 29.8 KG/M2 | TEMPERATURE: 98.6 F | DIASTOLIC BLOOD PRESSURE: 78 MMHG | HEART RATE: 76 BPM | SYSTOLIC BLOOD PRESSURE: 132 MMHG | WEIGHT: 220 LBS

## 2021-10-27 DIAGNOSIS — R07.9 CHEST PAIN, UNSPECIFIED TYPE: Primary | ICD-10-CM

## 2021-10-27 DIAGNOSIS — I35.0 AORTIC VALVE STENOSIS, ETIOLOGY OF CARDIAC VALVE DISEASE UNSPECIFIED: ICD-10-CM

## 2021-10-27 PROCEDURE — 99495 TRANSJ CARE MGMT MOD F2F 14D: CPT | Performed by: INTERNAL MEDICINE

## 2021-10-29 ENCOUNTER — TELEPHONE (OUTPATIENT)
Dept: FAMILY MEDICINE CLINIC | Facility: CLINIC | Age: 76
End: 2021-10-29

## 2021-11-02 ENCOUNTER — TELEPHONE (OUTPATIENT)
Dept: FAMILY MEDICINE CLINIC | Facility: CLINIC | Age: 76
End: 2021-11-02

## 2021-11-02 DIAGNOSIS — Z11.9 ENCOUNTER FOR SCREENING FOR INFECTIOUS AND PARASITIC DISEASES, UNSPECIFIED: Primary | ICD-10-CM

## 2021-11-02 PROCEDURE — U0005 INFEC AGEN DETEC AMPLI PROBE: HCPCS | Performed by: INTERNAL MEDICINE

## 2021-11-02 PROCEDURE — U0003 INFECTIOUS AGENT DETECTION BY NUCLEIC ACID (DNA OR RNA); SEVERE ACUTE RESPIRATORY SYNDROME CORONAVIRUS 2 (SARS-COV-2) (CORONAVIRUS DISEASE [COVID-19]), AMPLIFIED PROBE TECHNIQUE, MAKING USE OF HIGH THROUGHPUT TECHNOLOGIES AS DESCRIBED BY CMS-2020-01-R: HCPCS | Performed by: INTERNAL MEDICINE

## 2021-11-04 ENCOUNTER — OFFICE VISIT (OUTPATIENT)
Dept: CARDIOLOGY CLINIC | Facility: HOSPITAL | Age: 76
End: 2021-11-04
Payer: MEDICARE

## 2021-11-04 VITALS
BODY MASS INDEX: 29.8 KG/M2 | HEIGHT: 72 IN | DIASTOLIC BLOOD PRESSURE: 76 MMHG | WEIGHT: 220 LBS | SYSTOLIC BLOOD PRESSURE: 122 MMHG | HEART RATE: 76 BPM

## 2021-11-04 DIAGNOSIS — I35.0 AORTIC VALVE STENOSIS, ETIOLOGY OF CARDIAC VALVE DISEASE UNSPECIFIED: Primary | ICD-10-CM

## 2021-11-04 DIAGNOSIS — I82.4Z9 EMBOLISM FROM THROMBOSIS OF VEIN OF DISTAL END OF LOWER EXTREMITY (HCC): ICD-10-CM

## 2021-11-04 DIAGNOSIS — I60.9 SUBARACHNOID HEMORRHAGE (HCC): ICD-10-CM

## 2021-11-04 DIAGNOSIS — G40.909 SEIZURE DISORDER (HCC): ICD-10-CM

## 2021-11-04 DIAGNOSIS — E78.5 DYSLIPIDEMIA: ICD-10-CM

## 2021-11-04 PROCEDURE — 93000 ELECTROCARDIOGRAM COMPLETE: CPT | Performed by: INTERNAL MEDICINE

## 2021-11-04 PROCEDURE — 99204 OFFICE O/P NEW MOD 45 MIN: CPT | Performed by: INTERNAL MEDICINE

## 2021-11-04 RX ORDER — OXCARBAZEPINE 150 MG/1
150 TABLET, FILM COATED ORAL 2 TIMES DAILY
COMMUNITY

## 2021-11-16 ENCOUNTER — OFFICE VISIT (OUTPATIENT)
Dept: OTOLARYNGOLOGY | Facility: CLINIC | Age: 76
End: 2021-11-16
Payer: MEDICARE

## 2021-11-16 VITALS
HEIGHT: 72 IN | TEMPERATURE: 97.5 F | SYSTOLIC BLOOD PRESSURE: 110 MMHG | WEIGHT: 220 LBS | DIASTOLIC BLOOD PRESSURE: 70 MMHG | BODY MASS INDEX: 29.8 KG/M2 | OXYGEN SATURATION: 98 % | HEART RATE: 66 BPM

## 2021-11-16 DIAGNOSIS — H90.3 SENSORINEURAL HEARING LOSS (SNHL) OF BOTH EARS: Primary | ICD-10-CM

## 2021-11-16 DIAGNOSIS — H61.23 BILATERAL IMPACTED CERUMEN: ICD-10-CM

## 2021-11-16 PROCEDURE — 69210 REMOVE IMPACTED EAR WAX UNI: CPT | Performed by: OTOLARYNGOLOGY

## 2021-11-16 PROCEDURE — 99212 OFFICE O/P EST SF 10 MIN: CPT | Performed by: OTOLARYNGOLOGY

## 2021-11-23 ENCOUNTER — APPOINTMENT (OUTPATIENT)
Dept: LAB | Facility: MEDICAL CENTER | Age: 76
End: 2021-11-23
Payer: MEDICARE

## 2021-11-23 DIAGNOSIS — R97.20 ELEVATED PSA: ICD-10-CM

## 2021-11-23 LAB — PSA SERPL-MCNC: 7.9 NG/ML (ref 0–4)

## 2021-11-23 PROCEDURE — 84153 ASSAY OF PSA TOTAL: CPT

## 2021-11-24 ENCOUNTER — OFFICE VISIT (OUTPATIENT)
Dept: UROLOGY | Facility: MEDICAL CENTER | Age: 76
End: 2021-11-24
Payer: MEDICARE

## 2021-11-24 VITALS
BODY MASS INDEX: 29.77 KG/M2 | WEIGHT: 219.8 LBS | HEART RATE: 64 BPM | DIASTOLIC BLOOD PRESSURE: 92 MMHG | SYSTOLIC BLOOD PRESSURE: 152 MMHG | HEIGHT: 72 IN

## 2021-11-24 DIAGNOSIS — R97.20 ELEVATED PSA: Primary | ICD-10-CM

## 2021-11-24 PROCEDURE — 99214 OFFICE O/P EST MOD 30 MIN: CPT | Performed by: PHYSICIAN ASSISTANT

## 2021-11-29 ENCOUNTER — TELEPHONE (OUTPATIENT)
Dept: UROLOGY | Facility: CLINIC | Age: 76
End: 2021-11-29

## 2022-03-30 ENCOUNTER — TELEPHONE (OUTPATIENT)
Dept: FAMILY MEDICINE CLINIC | Facility: CLINIC | Age: 77
End: 2022-03-30

## 2022-04-19 ENCOUNTER — HOSPITAL ENCOUNTER (OUTPATIENT)
Dept: NON INVASIVE DIAGNOSTICS | Facility: HOSPITAL | Age: 77
Discharge: HOME | End: 2022-04-19
Payer: MEDICARE

## 2022-04-19 VITALS
HEART RATE: 58 BPM | HEIGHT: 72 IN | DIASTOLIC BLOOD PRESSURE: 92 MMHG | BODY MASS INDEX: 29.66 KG/M2 | WEIGHT: 219 LBS | SYSTOLIC BLOOD PRESSURE: 152 MMHG

## 2022-04-19 DIAGNOSIS — I35.0 AORTIC VALVE STENOSIS, ETIOLOGY OF CARDIAC VALVE DISEASE UNSPECIFIED: ICD-10-CM

## 2022-04-19 LAB
AORTIC ROOT: 3.4 CM
AORTIC VALVE MEAN VELOCITY: 29.6 M/S
APICAL FOUR CHAMBER EJECTION FRACTION: 57 %
ASCENDING AORTA: 3 CM (ref 2.16–3.23)
AV AREA BY CONTINUOUS VTI: 0.9 CM2
AV AREA PEAK VELOCITY: 1 CM2
AV LVOT MEAN GRADIENT: 2 MMHG
AV LVOT PEAK GRADIENT: 4 MMHG
AV MEAN GRADIENT: 39 MMHG
AV PEAK GRADIENT: 61 MMHG
AV VALVE AREA: 0.89 CM2
AV VELOCITY RATIO: 0.24
DOP CALC AO PEAK VEL: 4 M/S
DOP CALC AO VTI: 109.85 CM
DOP CALC LVOT AREA: 4.15 CM2
DOP CALC LVOT DIAMETER: 2.3 CM
DOP CALC LVOT PEAK VEL VTI: 23.58 CM
DOP CALC LVOT PEAK VEL: 0.94 M/S
DOP CALC LVOT STROKE INDEX: 43.2 ML/M2
DOP CALC LVOT STROKE VOLUME: 97.92 CM3
E WAVE DECELERATION TIME: 295 MS
FRACTIONAL SHORTENING: 33 % (ref 28–44)
INTERVENTRICULAR SEPTUM IN DIASTOLE (PARASTERNAL SHORT AXIS VIEW): 1.4 CM
INTERVENTRICULAR SEPTUM: 1.4 CM (ref 0.56–1.05)
LAAS-AP2: 24.6 CM2
LAAS-AP4: 22.8 CM2
LEFT ATRIUM AREA SYSTOLE SINGLE PLANE A4C: 21.7 CM2
LEFT ATRIUM SIZE: 4.1 CM
LEFT INTERNAL DIMENSION IN SYSTOLE: 3.2 CM (ref 4.15–6.28)
LEFT VENTRICULAR INTERNAL DIMENSION IN DIASTOLE: 4.8 CM (ref 6.91–10.3)
LEFT VENTRICULAR POSTERIOR WALL IN END DIASTOLE: 1.2 CM (ref 0.55–1.04)
LEFT VENTRICULAR STROKE VOLUME: 64 ML
LVSV (TEICH): 64 ML
MV E'TISSUE VEL-SEP: 9 CM/S
MV PEAK A VEL: 0.7 M/S
MV PEAK E VEL: 64 CM/S
RIGHT ATRIUM AREA SYSTOLE A4C: 18.5 CM2
RIGHT VENTRICLE ID DIMENSION: 3.7 CM
SL CV LEFT ATRIUM LENGTH A2C: 6.4 CM
SL CV LV EF: 60
SL CV PED ECHO LEFT VENTRICLE DIASTOLIC VOLUME (MOD BIPLANE) 2D: 106 ML
SL CV PED ECHO LEFT VENTRICLE SYSTOLIC VOLUME (MOD BIPLANE) 2D: 42 ML
Z-SCORE OF ASCENDING AORTA: 1.12
Z-SCORE OF INTERVENTRICULAR SEPTUM IN END DIASTOLE: 4.76
Z-SCORE OF LEFT VENTRICULAR DIMENSION IN END DIASTOLE: -5.64
Z-SCORE OF LEFT VENTRICULAR DIMENSION IN END SYSTOLE: -3.69
Z-SCORE OF LEFT VENTRICULAR POSTERIOR WALL IN END DIASTOLE: 3.27

## 2022-04-19 PROCEDURE — 93306 TTE W/DOPPLER COMPLETE: CPT | Performed by: INTERNAL MEDICINE

## 2022-04-19 PROCEDURE — 93306 TTE W/DOPPLER COMPLETE: CPT

## 2022-04-22 ENCOUNTER — PREP FOR PROCEDURE (OUTPATIENT)
Dept: CARDIOLOGY CLINIC | Facility: CLINIC | Age: 77
End: 2022-04-22

## 2022-04-22 ENCOUNTER — OFFICE VISIT (OUTPATIENT)
Dept: CARDIOLOGY CLINIC | Facility: HOSPITAL | Age: 77
End: 2022-04-22
Payer: MEDICARE

## 2022-04-22 VITALS
BODY MASS INDEX: 29.61 KG/M2 | HEART RATE: 64 BPM | SYSTOLIC BLOOD PRESSURE: 118 MMHG | HEIGHT: 72 IN | DIASTOLIC BLOOD PRESSURE: 70 MMHG | WEIGHT: 218.6 LBS

## 2022-04-22 DIAGNOSIS — I60.9 SUBARACHNOID HEMORRHAGE (HCC): ICD-10-CM

## 2022-04-22 DIAGNOSIS — I82.4Z9 EMBOLISM FROM THROMBOSIS OF VEIN OF DISTAL END OF LOWER EXTREMITY (HCC): ICD-10-CM

## 2022-04-22 DIAGNOSIS — I35.0 AORTIC VALVE STENOSIS, ETIOLOGY OF CARDIAC VALVE DISEASE UNSPECIFIED: Primary | ICD-10-CM

## 2022-04-22 DIAGNOSIS — E78.5 DYSLIPIDEMIA: ICD-10-CM

## 2022-04-22 DIAGNOSIS — R06.00 DOE (DYSPNEA ON EXERTION): ICD-10-CM

## 2022-04-22 PROBLEM — R06.09 DOE (DYSPNEA ON EXERTION): Status: ACTIVE | Noted: 2022-04-22

## 2022-04-22 PROCEDURE — 99214 OFFICE O/P EST MOD 30 MIN: CPT | Performed by: INTERNAL MEDICINE

## 2022-04-22 NOTE — H&P (VIEW-ONLY)
Cardiology Follow Up    [facility]  1945  [id number]  [facility]  [address] [address] [phone]    No diagnosis found  There are no diagnoses linked to this encounter  I had the pleasure of seeing Eduard Franz for a follow up visit  INTERVAL HISTORY: none    History of the presenting illness, Discussion/Summary and My Plan are as follows:::    Rock Salgado is a pleasant 70-year-old gentleman without a history of hypertension or diabetes  He does have mild dyslipidemia-on simvastatin  He also had a traumatic subarachnoid hemorrhage in 2009 as well as a seizure resulting from it in 2011 and none since while on anti seizure medications      He has been a lifelong nonsmoker, drinks about 5 times a week-1-2 drinks, no drug use  No family history of cardiac issues      He is a retired  who worked for [employer], retired about 20 years ago      He remains physically active, golfing two days a week, 9 holes, no longer walks the course but denies any chest pains, shortness of breath or dizziness  He has been feeling more tired for the last 2-3 year  He no longer exercises like at the last visit, has been busy buying and selling houses      He is also in the process of selling a condo in [place]  and eventually plans to move to the [facility] in [place]       At the prior visit, for a murmur on exam, underwent an echocardiogram that showed severe aortic stenosis      Plan:     Severe aortic stenosis:  now SOB, was not symptomatic at the last visit  Will refer to cardiac surgery,TAVR vs SAVR was explained to him and wife  -Anuj Morillo, will need CTA and cath    They would like to discuss with him     Echocardiogram-  Sept 2021 LV function is preserved, peak gradient 63, mean gradient 42, index 0 25, LVOT 2 1-valve area-0 8   April 2022:  Peak velocity 4, peak gradient 64, mean gradient 39, ratio 0 24, valve area 0 8     Now more SOB than before  no presyncope or CP     Follow-up in 3 months    Results for Tal Huntley (MRN [id number]) as of 4/22/2022 15:16   Ref  Range 10/14/2021 11:39   Cholesterol Latest Ref Range: 50 - 200 mg/dL 169   Triglycerides Latest Ref Range: <=150 mg/dL 123   HDL Latest Ref Range: >=40 mg/dL 59   Non-HDL Cholesterol Latest Units: mg/dl 110   LDL Calculated Latest Ref Range: 0 - 100 mg/dL 85       Patient Active Problem List   Diagnosis    [facility]brovascular accident ([facility] )    Chronic rhinitis    Dyslipidemia    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 ([facility])    Fuchs' corneal dystrophy    Seizure disorder ([facility])    Raised prostate specific antigen    SNHL (sensorineural hearing loss)    Subarachnoid hemorrhage ([facility] )    COVID-19    Medicare annual wellness visit, subsequent    Rheumatoid arthritis of multiple sites with negative rheumatoid factor ([place] [address] )    Chest pain    Aortic valve stenosis     Past Medical History:   Diagnosis Date    Arthritis     Concussion     Elevated PSA     Hyperlipidemia     Murmur, heart 2021    Seizure (HCC)     TIA (transient ischemic attack)     Traumatic brain injury [facility])      Social History     Socioeconomic History    Marital status: /Civil Union     Spouse name: Not on file    Number of children: Not on file    Years of education: Not on file    Highest education level: Not on file   Occupational History    Not on file   Tobacco Use    Smoking status: Never Smoker    Smokeless tobacco: Never Used   Vaping Use    Vaping Use: Never used   Substance and Sexual Activity    Alcohol use:  Yes    Drug use: Not Currently     Types: Marijuana    Sexual activity: Not on file   Other Topics Concern    Not on file   Social History Narrative    Not on file     Social Determinants of Health     Financial Resource Strain: Not on file   Food Insecurity: Not on file   Transportation Needs: Not on file   Physical Activity: Not on file   Stress: Not on file   Social Connections: Not on file   Intimate Partner Violence: Not on file   Housing Stability: Not on file      Family History   Problem Relation Age of Onset    Alzheimer's disease Father      No past surgical history on file  Current Outpatient Medications:     Ascorbic Acid (VITAMIN C) 100 MG tablet, Take by mouth, Disp: , Rfl:     aspirin-dipyridamole (AGGRENOX)  mg per 12 hr capsule, Take by mouth, Disp: , Rfl:     B Complex Vitamins (B COMPLEX 50) TABS, Take by mouth, Disp: , Rfl:     diclofenac sodium (VOLTAREN) 1 %, Place on the skin, Disp: , Rfl:     Garlic 10 MG CAPS, Take by mouth, Disp: , Rfl:     Glucosamine-Chondroit-Vit C-Mn (GLUCOSAMINE 1500 COMPLEX) CAPS, Take by mouth, Disp: , Rfl:     hydroxychloroquine (PLAQUENIL) 200 mg tablet, Take 1 tablet by mouth 2 (two) times a day, Disp: , Rfl:     loratadine (CLARITIN) 10 mg tablet, Take by mouth, Disp: , Rfl:     Multiple Vitamin-Folic Acid TABS, Take by mouth, Disp: , Rfl:     Omega-3 Fatty Acids (FISH OIL) 645 MG CAPS, Take by mouth, Disp: , Rfl:     OXcarbazepine (TRILEPTAL) 150 mg tablet, Take 150 mg by mouth 2 (two) times a day, Disp: , Rfl:     simvastatin (ZOCOR) 10 mg tablet, Take 1 tablet by mouth daily, Disp: , Rfl:     vitamin E, tocopherol, (VITAMIN E-400) 400 units capsule, Take by mouth, Disp: , Rfl:   No Known Allergies    Imaging: Echo complete w/ contrast if indicated    Result Date: 4/19/2022  Narrative: [place] [facility]  Left Ventricle: Left ventricular cavity size is normal  The left ventricular ejection fraction is 60%  Systolic function is normal  Wall motion is normal  There is mild concentric hypertrophy    Aortic Valve: The aortic valve is trileaflet  The leaflets are severely calcified  There is severely reduced mobility  There is severe stenosis  The aortic valve peak velocity is 4 0 m/s    Mitral Valve: There is annular calcification   There is mild regurgitation    Left Atrium: The atrium is mildly dilated  Review of Systems:  Review of Systems   Constitutional: Negative  HENT: Negative  Eyes: Negative  Respiratory: Positive for shortness of breath  Negative for apnea, cough, choking, chest tightness, wheezing and stridor  Cardiovascular: Negative  Musculoskeletal: Negative  Allergic/Immunologic: Negative  Physical Exam:  /70   Pulse 64   Ht 6' (1 829 m)   Wt 99 2 kg (218 lb 9 6 oz)   BMI 29 65 kg/m²   Physical Exam  Constitutional:       General: He is not in acute distress  Appearance: Normal appearance  He is not ill-appearing  HENT:      Nose: Nose normal  No congestion or rhinorrhea  Mouth/Throat:      Mouth: Mucous membranes are moist       Pharynx: No oropharyngeal exudate or posterior oropharyngeal erythema  Eyes:      General:         Right eye: No discharge  Left eye: No discharge  Pupils: Pupils are equal, round, and reactive to light  Neck:      Vascular: No carotid bruit  Cardiovascular:      Rate and Rhythm: Normal rate and regular rhythm  Heart sounds: Murmur heard  No friction rub  No gallop  Pulmonary:      Effort: No respiratory distress  Breath sounds: No stridor  No wheezing or rhonchi  Musculoskeletal:         General: No swelling, tenderness, deformity or signs of injury  Normal range of motion  Cervical back: Normal range of motion  No rigidity or tenderness  Lymphadenopathy:      Cervical: No cervical adenopathy  Skin:     General: Skin is warm  Coloration: Skin is not jaundiced or pale  Findings: No erythema  Neurological:      Mental Status: He is alert  This note was completed in part utilizing [employer] fluency direct voice recognition software     Grammatical errors, random word insertion, spelling mistakes, occasional wrong word or "sound-alike" substitutions and incomplete sentences may be an occasional consequence of the system secondary to software limitations, ambient noise and hardware issues  At the time of dictation, efforts were made to edit, clarify and /or correct errors  Please read the chart carefully and recognize, using context, where substitutions have occurred  If you have any questions or concerns about the context, text or information contained within the body of this dictation, please contact myself, the provider, for further clarification

## 2022-04-22 NOTE — PROGRESS NOTES
Cardiology Follow Up    [facility]inorajovita [facility]iz [facility]  1945  [id number]  [facility]  [address] [address] [phone]    No diagnosis found  There are no diagnoses linked to this encounter  I had the pleasure of seeing Liz Patel for a follow up visit  INTERVAL HISTORY: none    History of the presenting illness, Discussion/Summary and My Plan are as follows:::    Kit Harper is a pleasant 72-year-old gentleman without a history of hypertension or diabetes  He does have mild dyslipidemia-on simvastatin  He also had a traumatic subarachnoid hemorrhage in 2009 as well as a seizure resulting from it in 2011 and none since while on anti seizure medications      He has been a lifelong nonsmoker, drinks about 5 times a week-1-2 drinks, no drug use  No family history of cardiac issues      He is a retired  who worked for [employer], retired about 20 years ago      He remains physically active, golfing two days a week, 9 holes, no longer walks the course but denies any chest pains, shortness of breath or dizziness  He has been feeling more tired for the last 2-3 year  He no longer exercises like at the last visit, has been busy buying and selling houses      He is also in the process of selling a condo in [place] and eventually plans to move to the [facility] [place]       At the prior visit, for a murmur on exam, underwent an echocardiogram that showed severe aortic stenosis      Plan:     Severe aortic stenosis:  now SOB, was not symptomatic at the last visit  Will refer to cardiac surgery,TAVR vs SAVR was explained to him and wife  -Dean Bee, will need CTA and cath    They would like to discuss with him     Echocardiogram-  Sept 2021 LV function is preserved, peak gradient 63, mean gradient 42, index 0 25, LVOT 2 1-valve area-0 8   April 2022:  Peak velocity 4, peak gradient 64, mean gradient 39, ratio 0 24, valve area 0 8     Now more SOB than before  no presyncope or CP     Follow-up in 3 months    Results for Sergio Mason (MRN [id number]) as of 4/22/2022 15:16   Ref  Range 10/14/2021 11:39   Cholesterol Latest Ref Range: 50 - 200 mg/dL 169   Triglycerides Latest Ref Range: <=150 mg/dL 123   HDL Latest Ref Range: >=40 mg/dL 59   Non-HDL Cholesterol Latest Units: mg/dl 110   LDL Calculated Latest Ref Range: 0 - 100 mg/dL 85       Patient Active Problem List   Diagnosis    [facility] accident ([facility] )    Chronic rhinitis    Dyslipidemia    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 (HCC)    Fuchs' corneal dystrophy    Seizure disorder (HCC)    Raised prostate specific antigen    SNHL (sensorineural hearing loss)    Subarachnoid hemorrhage ([facility] )    COVID-19    Medicare annual wellness visit, subsequent    Rheumatoid arthritis of multiple sites with negative rheumatoid factor ([place] [address] )    Chest pain    Aortic valve stenosis     Past Medical History:   Diagnosis Date    Arthritis     Concussion     Elevated PSA     Hyperlipidemia     Murmur, heart 2021    Seizure (HCC)     TIA (transient ischemic attack)     Traumatic brain injury [facility])      Social History     Socioeconomic History    Marital status: /Civil Union     Spouse name: Not on file    Number of children: Not on file    Years of education: Not on file    Highest education level: Not on file   Occupational History    Not on file   Tobacco Use    Smoking status: Never Smoker    Smokeless tobacco: Never Used   Vaping Use    Vaping Use: Never used   Substance and Sexual Activity    Alcohol use:  Yes    Drug use: Not Currently     Types: Marijuana    Sexual activity: Not on file   Other Topics Concern    Not on file   Social History Narrative    Not on file     Social Determinants of Health     Financial Resource Strain: Not on file   Food Insecurity: Not on file   Transportation Needs: Not on file   Physical Activity: Not on file   Stress: Not on file   Social Connections: Not on file   Intimate Partner Violence: Not on file   Housing Stability: Not on file      Family History   Problem Relation Age of Onset    Alzheimer's disease Father      No past surgical history on file  Current Outpatient Medications:     Ascorbic Acid (VITAMIN C) 100 MG tablet, Take by mouth, Disp: , Rfl:     aspirin-dipyridamole (AGGRENOX)  mg per 12 hr capsule, Take by mouth, Disp: , Rfl:     B Complex Vitamins (B COMPLEX 50) TABS, Take by mouth, Disp: , Rfl:     diclofenac sodium (VOLTAREN) 1 %, Place on the skin, Disp: , Rfl:     Garlic 10 MG CAPS, Take by mouth, Disp: , Rfl:     Glucosamine-Chondroit-Vit C-Mn (GLUCOSAMINE 1500 COMPLEX) CAPS, Take by mouth, Disp: , Rfl:     hydroxychloroquine (PLAQUENIL) 200 mg tablet, Take 1 tablet by mouth 2 (two) times a day, Disp: , Rfl:     loratadine (CLARITIN) 10 mg tablet, Take by mouth, Disp: , Rfl:     Multiple Vitamin-Folic Acid TABS, Take by mouth, Disp: , Rfl:     Omega-3 Fatty Acids (FISH OIL) 645 MG CAPS, Take by mouth, Disp: , Rfl:     OXcarbazepine (TRILEPTAL) 150 mg tablet, Take 150 mg by mouth 2 (two) times a day, Disp: , Rfl:     simvastatin (ZOCOR) 10 mg tablet, Take 1 tablet by mouth daily, Disp: , Rfl:     vitamin E, tocopherol, (VITAMIN E-400) 400 units capsule, Take by mouth, Disp: , Rfl:   No Known Allergies    Imaging: Echo complete w/ contrast if indicated    Result Date: 4/19/2022  Narrative: [facility]  Left Ventricle: Left ventricular cavity size is normal  The left ventricular ejection fraction is 60%  Systolic function is normal  Wall motion is normal  There is mild concentric hypertrophy    Aortic Valve: The aortic valve is trileaflet  The leaflets are severely calcified  There is severely reduced mobility  There is severe stenosis  The aortic valve peak velocity is 4 0 m/s    Mitral Valve: There is annular calcification   There is mild regurgitation    Left Atrium: The atrium is mildly dilated  Review of Systems:  Review of Systems   Constitutional: Negative  HENT: Negative  Eyes: Negative  Respiratory: Positive for shortness of breath  Negative for apnea, cough, choking, chest tightness, wheezing and stridor  Cardiovascular: Negative  Musculoskeletal: Negative  Allergic/Immunologic: Negative  Physical Exam:  /70   Pulse 64   Ht 6' (1 829 m)   Wt 99 2 kg (218 lb 9 6 oz)   BMI 29 65 kg/m²   Physical Exam  Constitutional:       General: He is not in acute distress  Appearance: Normal appearance  He is not ill-appearing  HENT:      Nose: Nose normal  No congestion or rhinorrhea  Mouth/Throat:      Mouth: Mucous membranes are moist       Pharynx: No oropharyngeal exudate or posterior oropharyngeal erythema  Eyes:      General:         Right eye: No discharge  Left eye: No discharge  Pupils: Pupils are equal, round, and reactive to light  Neck:      Vascular: No carotid bruit  Cardiovascular:      Rate and Rhythm: Normal rate and regular rhythm  Heart sounds: Murmur heard  No friction rub  No gallop  Pulmonary:      Effort: No respiratory distress  Breath sounds: No stridor  No wheezing or rhonchi  Musculoskeletal:         General: No swelling, tenderness, deformity or signs of injury  Normal range of motion  Cervical back: Normal range of motion  No rigidity or tenderness  Lymphadenopathy:      Cervical: No cervical adenopathy  Skin:     General: Skin is warm  Coloration: Skin is not jaundiced or pale  Findings: No erythema  Neurological:      Mental Status: He is alert  This note was completed in part utilizing [employer] fluency direct voice recognition software     Grammatical errors, random word insertion, spelling mistakes, occasional wrong word or "sound-alike" substitutions and incomplete sentences may be an occasional consequence of the system secondary to software limitations, ambient noise and hardware issues  At the time of dictation, efforts were made to edit, clarify and /or correct errors  Please read the chart carefully and recognize, using context, where substitutions have occurred  If you have any questions or concerns about the context, text or information contained within the body of this dictation, please contact myself, the provider, for further clarification

## 2022-04-26 ENCOUNTER — TELEPHONE (OUTPATIENT)
Dept: CARDIOLOGY CLINIC | Facility: CLINIC | Age: 77
End: 2022-04-26

## 2022-04-26 ENCOUNTER — PREP FOR PROCEDURE (OUTPATIENT)
Dept: CARDIOLOGY CLINIC | Facility: CLINIC | Age: 77
End: 2022-04-26

## 2022-04-26 DIAGNOSIS — I35.0 AORTIC VALVE STENOSIS, ETIOLOGY OF CARDIAC VALVE DISEASE UNSPECIFIED: Primary | ICD-10-CM

## 2022-04-26 NOTE — TELEPHONE ENCOUNTER
Patient scheduled for R[facility]LHC on 5/11/22 in [place] with Dr Giuseppe Walsh  Instructions sent to patient through [address]  Per patient, Medicare as primary insurance

## 2022-04-29 ENCOUNTER — APPOINTMENT (OUTPATIENT)
Dept: LAB | Facility: MEDICAL CENTER | Age: 77
End: 2022-04-29
Payer: MEDICARE

## 2022-04-29 DIAGNOSIS — I35.0 AORTIC VALVE STENOSIS, ETIOLOGY OF CARDIAC VALVE DISEASE UNSPECIFIED: ICD-10-CM

## 2022-04-29 DIAGNOSIS — R97.20 ELEVATED PSA: ICD-10-CM

## 2022-04-29 LAB
ALBUMIN SERPL BCP-MCNC: 4.2 G/DL (ref 3.5–5)
ALP SERPL-CCNC: 76 U/L (ref 46–116)
ALT SERPL W P-5'-P-CCNC: 33 U/L (ref 12–78)
ANION GAP SERPL CALCULATED.3IONS-SCNC: 7 MMOL/L (ref 4–13)
AST SERPL W P-5'-P-CCNC: 26 U/L (ref 5–45)
BASOPHILS # BLD AUTO: 0.03 THOUSANDS/ΜL (ref 0–0.1)
BASOPHILS NFR BLD AUTO: 1 % (ref 0–1)
BILIRUB SERPL-MCNC: 1.09 MG/DL (ref 0.2–1)
BUN SERPL-MCNC: 20 MG/DL (ref 5–25)
CALCIUM SERPL-MCNC: 9.7 MG/DL (ref 8.3–10.1)
CHLORIDE SERPL-SCNC: 110 MMOL/L (ref 100–108)
CO2 SERPL-SCNC: 23 MMOL/L (ref 21–32)
CREAT SERPL-MCNC: 0.9 MG/DL (ref 0.6–1.3)
EOSINOPHIL # BLD AUTO: 0.05 THOUSAND/ΜL (ref 0–0.61)
EOSINOPHIL NFR BLD AUTO: 1 % (ref 0–6)
ERYTHROCYTE [DISTWIDTH] IN BLOOD BY AUTOMATED COUNT: 12.5 % (ref 11.6–15.1)
GFR SERPL CREATININE-BSD FRML MDRD: 83 ML/MIN/1.73SQ M
GLUCOSE P FAST SERPL-MCNC: 99 MG/DL (ref 65–99)
HCT VFR BLD AUTO: 46 % (ref 36.5–49.3)
HGB BLD-MCNC: 15.9 G/DL (ref 12–17)
IMM GRANULOCYTES # BLD AUTO: 0.01 THOUSAND/UL (ref 0–0.2)
IMM GRANULOCYTES NFR BLD AUTO: 0 % (ref 0–2)
LYMPHOCYTES # BLD AUTO: 1.56 THOUSANDS/ΜL (ref 0.6–4.47)
LYMPHOCYTES NFR BLD AUTO: 28 % (ref 14–44)
MCH RBC QN AUTO: 34.7 PG (ref 26.8–34.3)
MCHC RBC AUTO-ENTMCNC: 34.6 G/DL (ref 31.4–37.4)
MCV RBC AUTO: 100 FL (ref 82–98)
MONOCYTES # BLD AUTO: 0.61 THOUSAND/ΜL (ref 0.17–1.22)
MONOCYTES NFR BLD AUTO: 11 % (ref 4–12)
NEUTROPHILS # BLD AUTO: 3.36 THOUSANDS/ΜL (ref 1.85–7.62)
NEUTS SEG NFR BLD AUTO: 59 % (ref 43–75)
NRBC BLD AUTO-RTO: 0 /100 WBCS
PLATELET # BLD AUTO: 187 THOUSANDS/UL (ref 149–390)
PMV BLD AUTO: 10.5 FL (ref 8.9–12.7)
POTASSIUM SERPL-SCNC: 4 MMOL/L (ref 3.5–5.3)
PROT SERPL-MCNC: 7.4 G/DL (ref 6.4–8.2)
PSA SERPL-MCNC: 8.3 NG/ML (ref 0–4)
RBC # BLD AUTO: 4.58 MILLION/UL (ref 3.88–5.62)
SODIUM SERPL-SCNC: 140 MMOL/L (ref 136–145)
WBC # BLD AUTO: 5.62 THOUSAND/UL (ref 4.31–10.16)

## 2022-04-29 PROCEDURE — 84153 ASSAY OF PSA TOTAL: CPT

## 2022-04-29 PROCEDURE — 85025 COMPLETE CBC W/AUTO DIFF WBC: CPT

## 2022-04-29 PROCEDURE — 80053 COMPREHEN METABOLIC PANEL: CPT

## 2022-04-29 PROCEDURE — 36415 COLL VENOUS BLD VENIPUNCTURE: CPT

## 2022-05-03 ENCOUNTER — OFFICE VISIT (OUTPATIENT)
Dept: OTOLARYNGOLOGY | Facility: CLINIC | Age: 77
End: 2022-05-03
Payer: MEDICARE

## 2022-05-03 ENCOUNTER — TELEPHONE (OUTPATIENT)
Dept: UROLOGY | Facility: CLINIC | Age: 77
End: 2022-05-03

## 2022-05-03 VITALS
TEMPERATURE: 96.5 F | OXYGEN SATURATION: 94 % | DIASTOLIC BLOOD PRESSURE: 70 MMHG | HEIGHT: 72 IN | HEART RATE: 63 BPM | BODY MASS INDEX: 29.12 KG/M2 | WEIGHT: 215 LBS | SYSTOLIC BLOOD PRESSURE: 120 MMHG

## 2022-05-03 DIAGNOSIS — H61.23 BILATERAL IMPACTED CERUMEN: ICD-10-CM

## 2022-05-03 DIAGNOSIS — H90.3 ASYMMETRIC SNHL (SENSORINEURAL HEARING LOSS): Primary | ICD-10-CM

## 2022-05-03 DIAGNOSIS — H90.3 SENSORINEURAL HEARING LOSS (SNHL) OF BOTH EARS: ICD-10-CM

## 2022-05-03 PROCEDURE — 99213 OFFICE O/P EST LOW 20 MIN: CPT | Performed by: OTOLARYNGOLOGY

## 2022-05-03 RX ORDER — POLYETHYLENE GLYCOL 3350 17 G/17G
POWDER, FOR SOLUTION ORAL
COMMUNITY
Start: 2021-04-01

## 2022-05-03 RX ORDER — OMEPRAZOLE 20 MG/1
20 CAPSULE, DELAYED RELEASE ORAL 2 TIMES DAILY
COMMUNITY

## 2022-05-03 NOTE — PROGRESS NOTES
Otolaryngology Clinic Visit  Name:  Kathy Cooper  MRN:  [id number]  Date:  5/3/2022 8:29 AM  ________________________________________________________________________       CHIEF COMPLAINT:   Ear check     HPI:  Kathy Cooper is a 78 y o  male with PMH as below who presents today for evaluation of ear check  Reports their ears have been feeling clogged  No signs symptoms of ear infections  No dizziness or vertigo  No ear issues in the past  No issues with their nose  He has some partial deafness on the Right and wears a bicross  No other ENT questions or concerns  PMHx:  Past Medical History:   Diagnosis Date    Arthritis     Concussion     Elevated PSA     Hyperlipidemia     Murmur, heart 2021    Seizure ([address] )     TIA (transient ischemic attack)     Traumatic brain injury [facility])        PSHx:  History reviewed  No pertinent surgical history  FAMHx:  Family History   Problem Relation Age of Onset    Alzheimer's disease Father        SOCHx:  Social History     Socioeconomic History    Marital status: /Civil Union     Spouse name: None    Number of children: None    Years of education: None    Highest education level: None   Occupational History    None   Tobacco Use    Smoking status: Never Smoker    Smokeless tobacco: Never Used   Vaping Use    Vaping Use: Never used   Substance and Sexual Activity    Alcohol use:  Yes    Drug use: Not Currently     Types: Marijuana    Sexual activity: None   Other Topics Concern    None   Social History Narrative    None     Social Determinants of Health     Financial Resource Strain: Not on file   Food Insecurity: Not on file   Transportation Needs: Not on file   Physical Activity: Not on file   Stress: Not on file   Social Connections: Not on file   Intimate Partner Violence: Not on file   Housing Stability: Not on file       Allergies:  No Known Allergies     MEDS:  Reviewed    ROS:  See MA note     PHYSICAL EXAM:  /70 (BP Location: Left arm, Cuff Size: Large)   Pulse 63   Temp (!) 96 5 °F (35 8 °C) (Temporal)   Ht 6' (1 829 m)   Wt 97 5 kg (215 lb)   SpO2 94%   BMI 29 16 kg/m²   General: NAD, AOx4  Eyes:  EOMI  Ears:  Right: ear canal very minimal cerumen normal, TM normal apperance, no fluid  Left: ear canal very minimal cerumen normal, TM normal apperance, no fluid  Nasal: No external deformity   Oral cavity:  Unremarkable  Neck: Unremarkable  Lymph:  Unremarkable  Skin:  No obvious facial lesions  Neuro: Face symmetrical, no obvious cranial nerve palsy  No focal deficits   Lungs:  Normal work of breathing, symmetrical chest expansion  Vascular: Well perfused      Procedures:  None    Medical Data Reviewed:  Records reviewed and summarized as in EPIC    Radiology:  None    Labs:  None     Patient Active Problem List   Diagnosis    Cerebrovascular accident ([address] )    Chronic rhinitis    Dyslipidemia    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 ([address] )    Fuchs' corneal dystrophy    Seizure disorder ([address] )    Raised prostate specific antigen    SNHL (sensorineural hearing loss)    Subarachnoid hemorrhage ([address] )    COVID-19    Medicare annual wellness visit, subsequent    Rheumatoid arthritis of multiple sites with negative rheumatoid factor ([address] )    Chest pain    Aortic valve stenosis    DICKERSON (dyspnea on exertion)       ASSESSMENT/PLAN:  Hezzie Nyhan is a 78 y o  male with acute and chronic problems as above who presents with:    1  Asymmetric SNHL (sensorineural hearing loss)    2  Sensorineural hearing loss (SNHL) of both ears    3  Bilateral impacted cerumen        78 y o  here today for further evaluation of clogged ears  He had a little bit of wax which was removed with return to normal exam  He sees the [place] for amplification needs, he wears a bicross  He can RTC in 6 months or earlier if needed          Hermelinda Duran MD MPH  Otolaryngology--Head and Neck Surgery  [facility]  5/3/2022 8:29 AM

## 2022-05-03 NOTE — TELEPHONE ENCOUNTER
----- Message from Maria R Betancur PA-C sent at 5/2/2022  3:40 PM EDT -----  Pls call patient and let him know his PSA remains elevated  Would recommend repeat MRI for further evaluation  If amendable, let me know so I can place orders  Thanks!

## 2022-05-03 NOTE — TELEPHONE ENCOUNTER
Called patient - LMOM that his PSA remains elevated at 8 3 and we would like him to have a repeat MRI done  He is asked to call back if he is amendable to this  Office number is provided for callback

## 2022-05-03 NOTE — TELEPHONE ENCOUNTER
Spoke to pt about MRI recommendation from GABRIEL  Pt stated he is going for consultation with cardiologist for a heart murmer and wishes to take care of that issue first   Advised pt to follow up with us as soon as he can thereafter

## 2022-05-10 ENCOUNTER — OFFICE VISIT (OUTPATIENT)
Dept: FAMILY MEDICINE CLINIC | Facility: CLINIC | Age: 77
End: 2022-05-10
Payer: MEDICARE

## 2022-05-10 VITALS
WEIGHT: 214.2 LBS | BODY MASS INDEX: 29.01 KG/M2 | DIASTOLIC BLOOD PRESSURE: 70 MMHG | TEMPERATURE: 97.5 F | RESPIRATION RATE: 18 BRPM | SYSTOLIC BLOOD PRESSURE: 124 MMHG | HEIGHT: 72 IN | HEART RATE: 76 BPM

## 2022-05-10 DIAGNOSIS — R97.20 ELEVATED PSA: ICD-10-CM

## 2022-05-10 DIAGNOSIS — I35.0 AORTIC VALVE STENOSIS, ETIOLOGY OF CARDIAC VALVE DISEASE UNSPECIFIED: ICD-10-CM

## 2022-05-10 DIAGNOSIS — Z00.00 MEDICARE ANNUAL WELLNESS VISIT, SUBSEQUENT: Primary | ICD-10-CM

## 2022-05-10 PROCEDURE — G0439 PPPS, SUBSEQ VISIT: HCPCS | Performed by: INTERNAL MEDICINE

## 2022-05-10 PROCEDURE — 1123F ACP DISCUSS/DSCN MKR DOCD: CPT | Performed by: INTERNAL MEDICINE

## 2022-05-10 NOTE — PATIENT INSTRUCTIONS
Medicare Preventive Visit Patient Instructions  Thank you for completing your Welcome to Medicare Visit or Medicare Annual Wellness Visit today  Your next wellness visit will be due in one year (5/11/2023)  The screening/preventive services that you may require over the next 5-10 years are detailed below  Some tests may not apply to you based off risk factors and/or age  Screening tests ordered at today's visit but not completed yet may show as past due  Also, please note that scanned in results may not display below  Preventive Screenings:  Service Recommendations Previous Testing/Comments   Colorectal Cancer Screening  · Colonoscopy    · Fecal Occult Blood Test (FOBT)/Fecal Immunochemical Test (FIT)  · Fecal DNA/Cologuard Test  · Flexible Sigmoidoscopy Age: 54-65 years old   Colonoscopy: every 10 years (May be performed more frequently if at higher risk)  OR  FOBT/FIT: every 1 year  OR  Cologuard: every 3 years  OR  Sigmoidoscopy: every 5 years  Screening may be recommended earlier than age 48 if at higher risk for colorectal cancer  Also, an individualized decision between you and your healthcare provider will decide whether screening between the ages of 74-80 would be appropriate   Colonoscopy: Not on file  FOBT/FIT: Not on file  Cologuard: 01/13/2021  Sigmoidoscopy: Not on file    Screening Current     Prostate Cancer Screening Individualized decision between patient and health care provider in men between ages of 53-78   Medicare will cover every 12 months beginning on the day after your 50th birthday PSA: 8 3 ng/mL     Screening Current     Hepatitis C Screening Once for adults born between 1945 and 1965  More frequently in patients at high risk for Hepatitis C Hep C Antibody: 08/27/2014    Screening Current   Diabetes Screening 1-2 times per year if you're at risk for diabetes or have pre-diabetes Fasting glucose: 99 mg/dL   A1C: No results in last 5 years    Screening Current   Cholesterol Screening Once every 5 years if you don't have a lipid disorder  May order more often based on risk factors  Lipid panel: 10/14/2021    Screening Not Indicated  History Lipid Disorder      Other Preventive Screenings Covered by Medicare:  1  Abdominal Aortic Aneurysm (AAA) Screening: covered once if your at risk  You're considered to be at risk if you have a family history of AAA or a male between the age of 73-68 who smoking at least 100 cigarettes in your lifetime  2  Lung Cancer Screening: covers low dose CT scan once per year if you meet all of the following conditions: (1) Age 50-69; (2) No signs or symptoms of lung cancer; (3) Current smoker or have quit smoking within the last 15 years; (4) You have a tobacco smoking history of at least 30 pack years (packs per day x number of years you smoked); (5) You get a written order from a healthcare provider  3  Glaucoma Screening: covered annually if you're considered high risk: (1) You have diabetes OR (2) Family history of glaucoma OR (3)  aged 48 and older OR (3)  American aged 72 and older  3  Osteoporosis Screening: covered every 2 years if you meet one of the following conditions: (1) Have a vertebral abnormality; (2) On glucocorticoid therapy for more than 3 months; (3) Have primary hyperparathyroidism; (4) On osteoporosis medications and need to assess response to drug therapy  5  HIV Screening: covered annually if you're between the age of 12-76  Also covered annually if you are younger than 13 and older than 72 with risk factors for HIV infection  For pregnant patients, it is covered up to 3 times per pregnancy      Immunizations:  Immunization Recommendations   Influenza Vaccine Annual influenza vaccination during flu season is recommended for all persons aged >= 6 months who do not have contraindications   Pneumococcal Vaccine (Prevnar and Pneumovax)  * Prevnar = PCV13  * Pneumovax = PPSV23 Adults 25-60 years old: 1-3 doses may be recommended based on certain risk factors  Adults 72 years old: Prevnar (PCV13) vaccine recommended followed by Pneumovax (PPSV23) vaccine  If already received PPSV23 since turning 65, then PCV13 recommended at least one year after PPSV23 dose  Hepatitis B Vaccine 3 dose series if at intermediate or high risk (ex: diabetes, end stage renal disease, liver disease)   Tetanus (Td) Vaccine - COST NOT COVERED BY MEDICARE PART B Following completion of primary series, a booster dose should be given every 10 years to maintain immunity against tetanus  Td may also be given as tetanus wound prophylaxis  Tdap Vaccine - COST NOT COVERED BY MEDICARE PART B Recommended at least once for all adults  For pregnant patients, recommended with each pregnancy  Shingles Vaccine (Shingrix) - COST NOT COVERED BY MEDICARE PART B  2 shot series recommended in those aged 48 and above     Health Maintenance Due:      Topic Date Due    Colorectal Cancer Screening  01/14/2021    Hepatitis C Screening  Completed     Immunizations Due:      Topic Date Due    Pneumococcal Vaccine: 65+ Years (1 of 1 - PPSV23) 06/10/2012     Advance Directives   What are advance directives? Advance directives are legal documents that state your wishes and plans for medical care  These plans are made ahead of time in case you lose your ability to make decisions for yourself  Advance directives can apply to any medical decision, such as the treatments you want, and if you want to donate organs  What are the types of advance directives? There are many types of advance directives, and each state has rules about how to use them  You may choose a combination of any of the following:  · Living will: This is a written record of the treatment you want  You can also choose which treatments you do not want, which to limit, and which to stop at a certain time  This includes surgery, medicine, IV fluid, and tube feedings     · Durable power of  for healthcare [facility]): This is a written record that states who you want to make healthcare choices for you when you are unable to make them for yourself  This person, called a proxy, is usually a family member or a friend  You may choose more than 1 proxy  · Do not resuscitate (DNR) order:  A DNR order is used in case your heart stops beating or you stop breathing  It is a request not to have certain forms of treatment, such as CPR  A DNR order may be included in other types of advance directives  · Medical directive: This covers the care that you want if you are in a coma, near death, or unable to make decisions for yourself  You can list the treatments you want for each condition  Treatment may include pain medicine, surgery, blood transfusions, dialysis, IV or tube feedings, and a ventilator (breathing machine)  · Values history: This document has questions about your views, beliefs, and how you feel and think about life  This information can help others choose the care that you would choose  Why are advance directives important? An advance directive helps you control your care  Although spoken wishes may be used, it is better to have your wishes written down  Spoken wishes can be misunderstood, or not followed  Treatments may be given even if you do not want them  An advance directive may make it easier for your family to make difficult choices about your care  Weight Management   Why it is important to manage your weight:  Being overweight increases your risk of health conditions such as heart disease, high blood pressure, type 2 diabetes, and certain types of cancer  It can also increase your risk for osteoarthritis, sleep apnea, and other respiratory problems  Aim for a slow, steady weight loss  Even a small amount of weight loss can lower your risk of health problems  How to lose weight safely:  A safe and healthy way to lose weight is to eat fewer calories and get regular exercise   You can lose up about 1 pound a week by decreasing the number of calories you eat by 500 calories each day  Healthy meal plan for weight management:  A healthy meal plan includes a variety of foods, contains fewer calories, and helps you stay healthy  A healthy meal plan includes the following:  · Eat whole-grain foods more often  A healthy meal plan should contain fiber  Fiber is the part of grains, fruits, and vegetables that is not broken down by your body  Whole-grain foods are healthy and provide extra fiber in your diet  Some examples of whole-grain foods are whole-wheat breads and pastas, oatmeal, brown rice, and bulgur  · Eat a variety of vegetables every day  Include dark, leafy greens such as spinach, kale, luis alfredo greens, and mustard greens  Eat yellow and orange vegetables such as carrots, sweet potatoes, and winter squash  · Eat a variety of fruits every day  Choose fresh or canned fruit (canned in its own juice or light syrup) instead of juice  Fruit juice has very little or no fiber  · Eat low-fat dairy foods  Drink fat-free (skim) milk or 1% milk  Eat fat-free yogurt and low-fat cottage cheese  Try low-fat cheeses such as mozzarella and other reduced-fat cheeses  · Choose meat and other protein foods that are low in fat  Choose beans or other legumes such as split peas or lentils  Choose fish, skinless poultry (chicken or turkey), or lean cuts of red meat (beef or pork)  Before you cook meat or poultry, cut off any visible fat  · Use less fat and oil  Try baking foods instead of frying them  Add less fat, such as margarine, sour cream, regular salad dressing and mayonnaise to foods  Eat fewer high-fat foods  Some examples of high-fat foods include french fries, doughnuts, ice cream, and cakes  · Eat fewer sweets  Limit foods and drinks that are high in sugar  This includes candy, cookies, regular soda, and sweetened drinks  Exercise:  Exercise at least 30 minutes per day on most days of the week   Some examples of exercise include walking, biking, dancing, and swimming  You can also fit in more physical activity by taking the stairs instead of the elevator or parking farther away from stores  Ask your healthcare provider about the best exercise plan for you  Alcohol Use and Your Health    Drinking too much can harm your health  Excessive alcohol use leads to about 88,000 death in the [place] each year, and shortens the life of those who diet by almost 30 years  Further, excessive drinking cost the economy $249 billion in 2010  Most excessive drinkers are not alcohol dependent  Excessive alcohol use has immediate effects that increase the risk of many harmful health conditions  These are most often the result of binge drinking  Over time, excessive alcohol use can lead to the development of chronic diseases and other series health problems  What is considered a "drink"? Excessive alcohol use includes:  · Binge Drinking: For women, 4 or more drinks consumed on one occasion  For men, 5 or more drinks consumed on one occasion  · Heavy Drinking: For women, 8 or more drinks per week  For men, 15 or more drinks per week  · Any alcohol used by pregnant women  · Any alcohol used by those under the age of 21 years    If you choose to drink, do so in moderation:  · Do not drink at all if you are under the age of 24, or if you are or may be pregnant, or have health problems that could be made worse by drinking    · For women, up to 1 drink per day  · For men, up to 2 drinks a day    No one should begin drinking or drink more frequently based on potential health benefits    Short-Term Health Risks:  · Injuries: motor vehicle crashes, falls, drownings, burns  · Violence: homicide, suicide, sexual assault, intimate partner violence  · Alcohol poisoning  · Reproductive health: risky sexual behaviors, unintended prengnacy, sexually transmitted diseases, miscarriage, stillbirth, fetal alcohol syndrome    Long-Term Health Risks:  · Chronic diseases: high blood pressure, heart disease, stroke, liver disease, digestive problems  · Cancers: breast, mouth and throat, liver, colon  · Learning and memory problems: dementia, poor school performance  · Mental health: depression, anxiety, insomnia  · Social problems: lost productivity, family problems, unemployment  · Alcohol dependence    For support and more information:  · Substance Abuse and [facility] [address] [place]  Web Address: https://MyFeel[employer]/    · Alcoholics Anonymous        Web Address: http://[employer]/    https://www cdc gov/alcohol/fact-sheets/alcohol-use htm     © [address] 2018 Information is for End User's use only and may not be sold, redistributed or otherwise used for commercial purposes   All illustrations and images included in CareNotes® are the copyrighted property of A D A M , Inc  or [address] [place] [address] [employer]

## 2022-05-10 NOTE — PROGRESS NOTES
Assessment and Plan:     Problem List Items Addressed This Visit        Cardiovascular and Mediastinum    Aortic valve stenosis       Other    Elevated PSA    Medicare annual wellness visit, subsequent - Primary      Pt will call urology and schedule Mri prostate  He has cath tomorrow and followup with Ct surgeon  Limit etoh, walk as able for exercise Adequate hydration  Rto 6month   BMI Counseling: Body mass index is 29 05 kg/m²  The BMI is above normal  Nutrition recommendations include consuming healthier snacks, moderation in carbohydrate intake and increasing intake of lean protein  Exercise recommendations include exercising 3-5 times per week  Rationale for BMI follow-up plan is due to patient being overweight or obese  Preventive health issues were discussed with patient, and age appropriate screening tests were ordered as noted in patient's After Visit Summary  Personalized health advice and appropriate referrals for health education or preventive services given if needed, as noted in patient's After Visit Summary       History of Present Illness:     Patient presents for Medicare Annual Wellness visit    Patient Care Team:  Geraldo Worley DO as PCP - General (Internal Medicine)  MD Arlette Campbell MD Fransico Ghazi, MD     Problem List:     Patient Active Problem List   Diagnosis    Cerebrovascular accident [facility])    Chronic rhinitis    Dyslipidemia    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 ([address] )    Fuchs' corneal dystrophy    Seizure disorder ([address] )    Elevated PSA    SNHL (sensorineural hearing loss)    Subarachnoid hemorrhage ([address] )    COVID-19    Medicare annual wellness visit, subsequent    Rheumatoid arthritis of multiple sites with negative rheumatoid factor ([facility] )    Chest pain    Aortic valve stenosis    DICKERSON (dyspnea on exertion)      Past Medical and Surgical History:     Past Medical History:   Diagnosis Date    Arthritis  Concussion     Elevated PSA     Hyperlipidemia     Murmur, heart 2021    Seizure (HCC)     TIA (transient ischemic attack)     Traumatic brain injury [facility])      History reviewed  No pertinent surgical history  Family History:     Family History   Problem Relation Age of Onset    Alzheimer's disease Father       Social History:     Social History     Socioeconomic History    Marital status: /Civil Union     Spouse name: None    Number of children: None    Years of education: None    Highest education level: None   Occupational History    None   Tobacco Use    Smoking status: Never Smoker    Smokeless tobacco: Never Used   Vaping Use    Vaping Use: Never used   Substance and Sexual Activity    Alcohol use:  Yes    Drug use: Not Currently     Types: Marijuana    Sexual activity: None   Other Topics Concern    None   Social History Narrative    None     Social Determinants of Health     Financial Resource Strain: Not on file   Food Insecurity: Not on file   Transportation Needs: Not on file   Physical Activity: Not on file   Stress: Not on file   Social Connections: Not on file   Intimate Partner Violence: Not on file   Housing Stability: Not on file      Medications and Allergies:     Current Outpatient Medications   Medication Sig Dispense Refill    Ascorbic Acid (VITAMIN C) 100 MG tablet Take by mouth      aspirin-dipyridamole (AGGRENOX)  mg per 12 hr capsule Take by mouth      B Complex Vitamins (B COMPLEX 50) TABS Take by mouth      diclofenac sodium (VOLTAREN) 1 % Place on the skin      Garlic 10 MG CAPS Take by mouth      Glucosamine-Chondroit-Vit C-Mn (GLUCOSAMINE 1500 COMPLEX) CAPS Take by mouth      hydroxychloroquine (PLAQUENIL) 200 mg tablet Take 1 tablet by mouth 2 (two) times a day      loratadine (CLARITIN) 10 mg tablet Take by mouth      Multiple Vitamin-Folic Acid TABS Take by mouth      Omega-3 Fatty Acids (FISH OIL) 645 MG CAPS Take by mouth      omeprazole (PriLOSEC) 20 mg delayed release capsule Take 20 mg by mouth 2 (two) times a day      OXcarbazepine (TRILEPTAL) 150 mg tablet Take 150 mg by mouth 2 (two) times a day      polyethylene glycol (GLYCOLAX) 17 GM/SCOOP powder TAKE 1 CAPFUL (17GM) BY MOUTH ONCE EVERY DAY AS NEEDED MIXED IN WATER OR JUICE      simvastatin (ZOCOR) 10 mg tablet Take 1 tablet by mouth daily      vitamin E, tocopherol, (VITAMIN E-400) 400 units capsule Take by mouth       No current facility-administered medications for this visit  No Known Allergies   Immunizations:     Immunization History   Administered Date(s) Administered    COVID-19 MODERNA VACC 0 5 ML IM 02/15/2021, 03/15/2021, 10/26/2021    DTaP,unspecified 01/01/2008    H1N1, All Formulations 12/15/2009    INFLUENZA 01/01/2006, 10/01/2006, 09/01/2007, 11/01/2007, 09/21/2009, 10/27/2010, 09/01/2012, 12/18/2012, 11/04/2014, 05/16/2016, 10/28/2020    Influenza Split High Dose Preservative Free IM 10/04/2017    Influenza, Seasonal Vaccine, Quadrivalent, Adjuvanted,  5e 09/09/2021    Influenza, high dose seasonal 0 7 mL 10/13/2020    Influenza, seasonal, injectable 11/05/2013, 05/16/2016    Influenza, seasonal, injectable, preservative free 11/12/2015, 11/10/2016    Pneumococcal 05/20/2014    Td (adult), Unspecified 11/20/2014    Td (adult), adsorbed 11/20/2014    Tuberculin Skin Test-PPD Intradermal 01/04/2021    Zoster 01/12/2012    influenza, trivalent, adjuvanted 11/20/2018      Health Maintenance:         Topic Date Due    Colorectal Cancer Screening  01/14/2021    Hepatitis C Screening  Completed         Topic Date Due    Pneumococcal Vaccine: 65+ Years (1 of 1 - PPSV23) 06/10/2012      Medicare Health Risk Assessment:     /70   Pulse 76   Temp 97 5 °F (36 4 °C) (Temporal)   Resp 18   Ht 6' (1 829 m)   Wt 97 2 kg (214 lb 3 2 oz)   BMI 29 05 kg/m²      [facility] here [facility] Subsequent Wellness visit   Last Medicare Wellness visit information reviewed, patient interviewed, no change since last AWV  Health Risk Assessment:   Patient rates overall health as very good  Patient feels that their physical health rating is slightly better  Patient is very satisfied with their life  Eyesight was rated as same  Hearing was rated as same  Patient feels that their emotional and mental health rating is slightly better  Patients states they are never, rarely angry  Patient states they are never, rarely unusually tired/fatigued  Pain experienced in the last 7 days has been none  Patient states that he has experienced no weight loss or gain in last 6 months  Just struggling a little with the fact that I'm having heart problems  Depression Screening:   PHQ-2 Score: 0      Fall Risk Screening: In the past year, patient has experienced: no history of falling in past year      Home Safety:  Patient does not have trouble with stairs inside or outside of their home  Patient has working smoke alarms and has working carbon monoxide detector  Home safety hazards include: none  Nutrition:   Current diet is Regular  Medications:   Patient is currently taking over-the-counter supplements  OTC medications include: All are listed in My Chart  Patient is able to manage medications  Activities of Daily Living (ADLs)/Instrumental Activities of Daily Living (IADLs):   Walk and transfer into and out of bed and chair?: Yes  Dress and groom yourself?: Yes    Bathe or shower yourself?: Yes    Feed yourself? Yes  Do your laundry/housekeeping?: Yes  Manage your money, pay your bills and track your expenses?: Yes  Make your own meals?: Yes    Do your own shopping?: Yes    Previous Hospitalizations:   Any hospitalizations or ED visits within the last 12 months?: Yes    How many hospitalizations have you had in the last year?: 1-2    Hospitalization Comments: Suspected heart problem in fall of 2021  Visited ER, no problems found      Advance Care Planning: Living will: Yes    Durable POA for healthcare: Yes    Advanced directive: Yes    End of Life Decisions reviewed with patient: Yes    Provider agrees with end of life decisions: Yes      Cognitive Screening:   Provider or family/friend/caregiver concerned regarding cognition?: No    PREVENTIVE SCREENINGS      Cardiovascular Screening:    General: History Lipid Disorder and Screening Current      Diabetes Screening:     General: Screening Current      Colorectal Cancer Screening:     General: Screening Current      Prostate Cancer Screening:    General: Screening Current      Osteoporosis Screening:    General: Screening Not Indicated      Abdominal Aortic Aneurysm (AAA) Screening:    Risk factors include: age between 73-69 yo        Lung Cancer Screening:     General: Screening Not Indicated      Hepatitis C Screening:    General: Screening Current    Screening, Brief Intervention, and Referral to Treatment (SBIRT)    Screening  Typical number of drinks in a day: 1  Typical number of drinks in a week: 7  Interpretation: Low risk drinking behavior  AUDIT-C Screenin) How often did you have a drink containing alcohol in the past year? 4 or more times a week  2) How many drinks did you have on a typical day when you were drinking in the past year? 1 to 2  3) How often did you have 6 or more drinks on one occasion in the past year? less than monthly    AUDIT-C Score: 5  Interpretation: Score 4-12 (male): POSITIVE screen for alcohol misuse    AUDIT Screenin) How often during the last year have you found that you were not able to stop drinking once you had started? 0 - never  5) How often during the last year have you failed to do what was normally expected from you because of drinking? 0 - never  6) How often during the last year have you needed a first drink in the morning to get yourself going after a heavy drinking session?  0 - never  7) How often during the last year have you had a feeling of guilt or remorse after drinking? 0 - never  8) How often during the last year have you been unable to remember what happened the night before because you had been drinking? 0 - never  9) Have you or someone else been injured as a result of your drinking? 0 - no  10) Has a relative or friend or a doctor or another health worker been concerned about your drinking or suggested you cut down? 0 - no    AUDIT Score: 5  Interpretation: Low risk alcohol consumption    Single Item Drug Screening:  How often have you used an illegal drug (including marijuana) or a prescription medication for non-medical reasons in the past year? never    Single Item Drug Screen Score: 0  Interpretation: Negative screen for possible drug use disorder    Brief Intervention  Alcohol & drug use screenings were reviewed  No concerns regarding substance use disorder identified  Other Counseling Topics:   Regular weightbearing exercise and calcium and vitamin D intake         Gopi Leal, DO

## 2022-05-11 ENCOUNTER — HOSPITAL ENCOUNTER (OUTPATIENT)
Facility: HOSPITAL | Age: 77
Setting detail: OUTPATIENT SURGERY
Discharge: HOME | End: 2022-05-11
Attending: INTERNAL MEDICINE | Admitting: INTERNAL MEDICINE
Payer: MEDICARE

## 2022-05-11 VITALS
HEART RATE: 63 BPM | RESPIRATION RATE: 17 BRPM | SYSTOLIC BLOOD PRESSURE: 102 MMHG | TEMPERATURE: 97.5 F | OXYGEN SATURATION: 94 % | DIASTOLIC BLOOD PRESSURE: 58 MMHG | BODY MASS INDEX: 28.44 KG/M2 | HEIGHT: 72 IN | WEIGHT: 210 LBS

## 2022-05-11 DIAGNOSIS — I35.0 AORTIC VALVE STENOSIS, ETIOLOGY OF CARDIAC VALVE DISEASE UNSPECIFIED: ICD-10-CM

## 2022-05-11 LAB
ALBUMIN SERPL BCP-MCNC: 3.7 G/DL (ref 3.5–5)
ALP SERPL-CCNC: 71 U/L (ref 46–116)
ALT SERPL W P-5'-P-CCNC: 31 U/L (ref 12–78)
ANION GAP SERPL CALCULATED.3IONS-SCNC: 5 MMOL/L (ref 4–13)
AST SERPL W P-5'-P-CCNC: 22 U/L (ref 5–45)
BILIRUB SERPL-MCNC: 0.52 MG/DL (ref 0.2–1)
BUN SERPL-MCNC: 21 MG/DL (ref 5–25)
CALCIUM SERPL-MCNC: 9.2 MG/DL (ref 8.3–10.1)
CHLORIDE SERPL-SCNC: 113 MMOL/L (ref 100–108)
CO2 SERPL-SCNC: 25 MMOL/L (ref 21–32)
CREAT SERPL-MCNC: 0.71 MG/DL (ref 0.6–1.3)
GFR SERPL CREATININE-BSD FRML MDRD: 92 ML/MIN/1.73SQ M
GLUCOSE P FAST SERPL-MCNC: 109 MG/DL (ref 65–99)
GLUCOSE SERPL-MCNC: 109 MG/DL (ref 65–140)
POTASSIUM SERPL-SCNC: 4.1 MMOL/L (ref 3.5–5.3)
PROT SERPL-MCNC: 6.6 G/DL (ref 6.4–8.2)
SODIUM SERPL-SCNC: 143 MMOL/L (ref 136–145)

## 2022-05-11 PROCEDURE — 99152 MOD SED SAME PHYS/QHP 5/>YRS: CPT | Performed by: INTERNAL MEDICINE

## 2022-05-11 PROCEDURE — 93454 CORONARY ARTERY ANGIO S&I: CPT | Performed by: INTERNAL MEDICINE

## 2022-05-11 PROCEDURE — C1769 GUIDE WIRE: HCPCS | Performed by: INTERNAL MEDICINE

## 2022-05-11 PROCEDURE — 93005 ELECTROCARDIOGRAM TRACING: CPT

## 2022-05-11 PROCEDURE — 99153 MOD SED SAME PHYS/QHP EA: CPT | Performed by: INTERNAL MEDICINE

## 2022-05-11 PROCEDURE — C1894 INTRO/SHEATH, NON-LASER: HCPCS | Performed by: INTERNAL MEDICINE

## 2022-05-11 PROCEDURE — 80053 COMPREHEN METABOLIC PANEL: CPT | Performed by: INTERNAL MEDICINE

## 2022-05-11 RX ORDER — HEPARIN SODIUM 1000 [USP'U]/ML
INJECTION, SOLUTION INTRAVENOUS; SUBCUTANEOUS AS NEEDED
Status: DISCONTINUED | OUTPATIENT
Start: 2022-05-11 | End: 2022-05-11 | Stop reason: HOSPADM

## 2022-05-11 RX ORDER — LIDOCAINE HYDROCHLORIDE 10 MG/ML
INJECTION, SOLUTION EPIDURAL; INFILTRATION; INTRACAUDAL; PERINEURAL AS NEEDED
Status: DISCONTINUED | OUTPATIENT
Start: 2022-05-11 | End: 2022-05-11 | Stop reason: HOSPADM

## 2022-05-11 RX ORDER — ACETAMINOPHEN 325 MG/1
650 TABLET ORAL EVERY 4 HOURS PRN
Status: DISCONTINUED | OUTPATIENT
Start: 2022-05-11 | End: 2022-05-11 | Stop reason: HOSPADM

## 2022-05-11 RX ORDER — MIDAZOLAM HYDROCHLORIDE 2 MG/2ML
INJECTION, SOLUTION INTRAMUSCULAR; INTRAVENOUS AS NEEDED
Status: DISCONTINUED | OUTPATIENT
Start: 2022-05-11 | End: 2022-05-11 | Stop reason: HOSPADM

## 2022-05-11 RX ORDER — SODIUM CHLORIDE 9 MG/ML
125 INJECTION, SOLUTION INTRAVENOUS CONTINUOUS
Status: DISCONTINUED | OUTPATIENT
Start: 2022-05-11 | End: 2022-05-11

## 2022-05-11 RX ORDER — ONDANSETRON 2 MG/ML
4 INJECTION INTRAMUSCULAR; INTRAVENOUS EVERY 6 HOURS PRN
Status: DISCONTINUED | OUTPATIENT
Start: 2022-05-11 | End: 2022-05-11 | Stop reason: HOSPADM

## 2022-05-11 RX ORDER — NITROGLYCERIN 20 MG/100ML
INJECTION INTRAVENOUS AS NEEDED
Status: DISCONTINUED | OUTPATIENT
Start: 2022-05-11 | End: 2022-05-11 | Stop reason: HOSPADM

## 2022-05-11 RX ORDER — ASPIRIN 81 MG/1
324 TABLET, CHEWABLE ORAL ONCE
Status: COMPLETED | OUTPATIENT
Start: 2022-05-11 | End: 2022-05-11

## 2022-05-11 RX ORDER — SODIUM CHLORIDE 9 MG/ML
75 INJECTION, SOLUTION INTRAVENOUS CONTINUOUS
Status: DISCONTINUED | OUTPATIENT
Start: 2022-05-11 | End: 2022-05-11 | Stop reason: HOSPADM

## 2022-05-11 RX ORDER — FENTANYL CITRATE 50 UG/ML
INJECTION, SOLUTION INTRAMUSCULAR; INTRAVENOUS AS NEEDED
Status: DISCONTINUED | OUTPATIENT
Start: 2022-05-11 | End: 2022-05-11 | Stop reason: HOSPADM

## 2022-05-11 RX ORDER — VERAPAMIL HCL 2.5 MG/ML
AMPUL (ML) INTRAVENOUS AS NEEDED
Status: DISCONTINUED | OUTPATIENT
Start: 2022-05-11 | End: 2022-05-11 | Stop reason: HOSPADM

## 2022-05-11 RX ADMIN — SODIUM CHLORIDE 125 ML/HR: 0.9 INJECTION, SOLUTION INTRAVENOUS at 09:01

## 2022-05-11 RX ADMIN — ASPIRIN 81 MG CHEWABLE TABLET 324 MG: 81 TABLET CHEWABLE at 09:04

## 2022-05-11 NOTE — INTERVAL H&P NOTE
H&P reviewed  After examining the patient I find no changes in the patients condition since the H&P had been written      For severe AS work up, TAVR vs SAVR pending surgical evaluation    Vitals:    05/11/22 0923   BP: 120/75   Pulse: 59   Resp: 18   Temp: 97 5 °F (36 4 °C)   SpO2: 99%     Lisa Lundy MD / 05/11/22 / 9:31 AM

## 2022-05-11 NOTE — DISCHARGE INSTRUCTIONS
1  Please see the post cardiac catheterization dishcarge instructions  No heavy lifting, greater than 10 lbs  or strenuous  activity for 48 hrs  2 Remove band aid tomorrow  Shower and wash area- wrist gently with soap and water- beginning tomorrow  Rinse and pat dry  Apply new water seal band aid  Repeat this process for 5 days  No powders, creams lotions or antibiotic ointments  for 5 days  No tub baths, hot tubs or swimming for 5 days  3  Please call our office ([phone]) if you have any fever, redness, swelling, discharge from your wrist access site      4 No driving for 1 day

## 2022-05-12 LAB
ATRIAL RATE: 59 BPM
P AXIS: 56 DEGREES
PR INTERVAL: 176 MS
QRS AXIS: 2 DEGREES
QRSD INTERVAL: 84 MS
QT INTERVAL: 454 MS
QTC INTERVAL: 449 MS
T WAVE AXIS: 15 DEGREES
VENTRICULAR RATE: 59 BPM

## 2022-05-12 PROCEDURE — 93010 ELECTROCARDIOGRAM REPORT: CPT | Performed by: INTERNAL MEDICINE

## 2022-05-25 ENCOUNTER — OFFICE VISIT (OUTPATIENT)
Dept: CARDIAC SURGERY | Facility: CLINIC | Age: 77
End: 2022-05-25
Payer: MEDICARE

## 2022-05-25 ENCOUNTER — TELEPHONE (OUTPATIENT)
Dept: FAMILY MEDICINE CLINIC | Facility: CLINIC | Age: 77
End: 2022-05-25

## 2022-05-25 VITALS
WEIGHT: 215 LBS | HEART RATE: 65 BPM | HEIGHT: 72 IN | OXYGEN SATURATION: 98 % | SYSTOLIC BLOOD PRESSURE: 124 MMHG | BODY MASS INDEX: 29.12 KG/M2 | RESPIRATION RATE: 18 BRPM | DIASTOLIC BLOOD PRESSURE: 80 MMHG

## 2022-05-25 DIAGNOSIS — I35.0 AORTIC VALVE STENOSIS, ETIOLOGY OF CARDIAC VALVE DISEASE UNSPECIFIED: ICD-10-CM

## 2022-05-25 DIAGNOSIS — Z13.6 ENCOUNTER FOR SPECIAL SCREENING EXAMINATION FOR CARDIOVASCULAR DISORDER: ICD-10-CM

## 2022-05-25 DIAGNOSIS — Z13.6 ENCOUNTER FOR SCREENING FOR STENOSIS OF CAROTID ARTERY: Primary | ICD-10-CM

## 2022-05-25 DIAGNOSIS — Z00.00 ROUTINE ADULT HEALTH MAINTENANCE: ICD-10-CM

## 2022-05-25 DIAGNOSIS — R97.20 ELEVATED PSA: Primary | ICD-10-CM

## 2022-05-25 PROCEDURE — 99204 OFFICE O/P NEW MOD 45 MIN: CPT | Performed by: NURSE PRACTITIONER

## 2022-05-25 NOTE — TELEPHONE ENCOUNTER
Pt asking if you could order the mri that was discussed at his last office visit due to his elevated psa?
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
## 2022-05-25 NOTE — TELEPHONE ENCOUNTER
There was a note from May regarding ordering an MRI for this mutual patient   He called today and is interested in following up with the MRI - can you coordinate/order for patient please

## 2022-05-27 NOTE — TELEPHONE ENCOUNTER
Called patient and instructed to get lab work prior to MRI  Transferred to central scheduling to arrange at his convenience

## 2022-07-11 ENCOUNTER — HOSPITAL ENCOUNTER (OUTPATIENT)
Dept: NON INVASIVE DIAGNOSTICS | Facility: HOSPITAL | Age: 77
Discharge: HOME | End: 2022-07-11
Payer: MEDICARE

## 2022-07-11 ENCOUNTER — HOSPITAL ENCOUNTER (OUTPATIENT)
Dept: RADIOLOGY | Facility: HOSPITAL | Age: 77
Discharge: HOME | End: 2022-07-11
Payer: MEDICARE

## 2022-07-11 DIAGNOSIS — Z13.6 ENCOUNTER FOR SPECIAL SCREENING EXAMINATION FOR CARDIOVASCULAR DISORDER: ICD-10-CM

## 2022-07-11 DIAGNOSIS — I35.0 AORTIC VALVE STENOSIS, ETIOLOGY OF CARDIAC VALVE DISEASE UNSPECIFIED: ICD-10-CM

## 2022-07-11 DIAGNOSIS — Z13.6 ENCOUNTER FOR SCREENING FOR STENOSIS OF CAROTID ARTERY: ICD-10-CM

## 2022-07-11 PROCEDURE — G1004 CDSM NDSC: HCPCS

## 2022-07-11 PROCEDURE — 75572 CT HRT W/3D IMAGE: CPT

## 2022-07-11 PROCEDURE — 93880 EXTRACRANIAL BILAT STUDY: CPT

## 2022-07-11 PROCEDURE — 93880 EXTRACRANIAL BILAT STUDY: CPT | Performed by: SURGERY

## 2022-07-11 PROCEDURE — 74174 CTA ABD&PLVS W/CONTRAST: CPT

## 2022-07-11 RX ORDER — IODIXANOL 320 MG/ML
120 INJECTION, SOLUTION INTRAVASCULAR
Status: COMPLETED | OUTPATIENT
Start: 2022-07-11 | End: 2022-07-11

## 2022-07-11 RX ADMIN — IODIXANOL 120 ML: 320 INJECTION, SOLUTION INTRAVASCULAR at 10:58

## 2022-07-13 ENCOUNTER — OFFICE VISIT (OUTPATIENT)
Dept: CARDIAC SURGERY | Facility: CLINIC | Age: 77
End: 2022-07-13
Payer: MEDICARE

## 2022-07-13 VITALS
SYSTOLIC BLOOD PRESSURE: 115 MMHG | WEIGHT: 213 LBS | DIASTOLIC BLOOD PRESSURE: 76 MMHG | TEMPERATURE: 97.3 F | HEART RATE: 61 BPM | HEIGHT: 72 IN | RESPIRATION RATE: 18 BRPM | BODY MASS INDEX: 28.85 KG/M2 | OXYGEN SATURATION: 98 %

## 2022-07-13 DIAGNOSIS — I35.0 AORTIC VALVE STENOSIS, ETIOLOGY OF CARDIAC VALVE DISEASE UNSPECIFIED: Primary | ICD-10-CM

## 2022-07-13 PROCEDURE — 99214 OFFICE O/P EST MOD 30 MIN: CPT | Performed by: PHYSICIAN ASSISTANT

## 2022-07-13 RX ORDER — CEFAZOLIN SODIUM 2 G/50ML
2000 SOLUTION INTRAVENOUS ONCE
Status: CANCELLED | OUTPATIENT
Start: 2022-07-13 | End: 2022-07-13

## 2022-07-13 RX ORDER — CHLORHEXIDINE GLUCONATE 0.12 MG/ML
15 RINSE ORAL ONCE
Status: CANCELLED | OUTPATIENT
Start: 2022-07-13 | End: 2022-07-13
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
## 2022-07-14 ENCOUNTER — APPOINTMENT (OUTPATIENT)
Dept: LAB | Facility: HOSPITAL | Age: 77
End: 2022-07-14
Payer: MEDICARE

## 2022-07-14 ENCOUNTER — LAB REQUISITION (OUTPATIENT)
Dept: LAB | Facility: HOSPITAL | Age: 77
End: 2022-07-14
Payer: MEDICARE

## 2022-07-14 DIAGNOSIS — I35.0 NONRHEUMATIC AORTIC (VALVE) STENOSIS: ICD-10-CM

## 2022-07-14 DIAGNOSIS — R97.20 ELEVATED PSA: ICD-10-CM

## 2022-07-14 DIAGNOSIS — I35.0 AORTIC VALVE STENOSIS, ETIOLOGY OF CARDIAC VALVE DISEASE UNSPECIFIED: ICD-10-CM

## 2022-07-14 LAB
ABO GROUP BLD: NORMAL
ALBUMIN SERPL BCP-MCNC: 4.1 G/DL (ref 3.5–5)
ALP SERPL-CCNC: 79 U/L (ref 46–116)
ALT SERPL W P-5'-P-CCNC: 45 U/L (ref 12–78)
ANION GAP SERPL CALCULATED.3IONS-SCNC: 8 MMOL/L (ref 4–13)
AST SERPL W P-5'-P-CCNC: 23 U/L (ref 5–45)
BASOPHILS # BLD AUTO: 0.02 THOUSANDS/ΜL (ref 0–0.1)
BASOPHILS NFR BLD AUTO: 1 % (ref 0–1)
BILIRUB SERPL-MCNC: 1.05 MG/DL (ref 0.2–1)
BLD GP AB SCN SERPL QL: NEGATIVE
BUN SERPL-MCNC: 20 MG/DL (ref 5–25)
CALCIUM SERPL-MCNC: 9.1 MG/DL (ref 8.3–10.1)
CHLORIDE SERPL-SCNC: 108 MMOL/L (ref 100–108)
CO2 SERPL-SCNC: 26 MMOL/L (ref 21–32)
CREAT SERPL-MCNC: 0.9 MG/DL (ref 0.6–1.3)
EOSINOPHIL # BLD AUTO: 0.06 THOUSAND/ΜL (ref 0–0.61)
EOSINOPHIL NFR BLD AUTO: 2 % (ref 0–6)
ERYTHROCYTE [DISTWIDTH] IN BLOOD BY AUTOMATED COUNT: 12.9 % (ref 11.6–15.1)
GFR SERPL CREATININE-BSD FRML MDRD: 83 ML/MIN/1.73SQ M
GLUCOSE P FAST SERPL-MCNC: 101 MG/DL (ref 65–99)
HCT VFR BLD AUTO: 44.5 % (ref 36.5–49.3)
HGB BLD-MCNC: 15.3 G/DL (ref 12–17)
IMM GRANULOCYTES # BLD AUTO: 0.01 THOUSAND/UL (ref 0–0.2)
IMM GRANULOCYTES NFR BLD AUTO: 0 % (ref 0–2)
INR PPP: 0.93 (ref 0.84–1.19)
LYMPHOCYTES # BLD AUTO: 1.65 THOUSANDS/ΜL (ref 0.6–4.47)
LYMPHOCYTES NFR BLD AUTO: 41 % (ref 14–44)
MCH RBC QN AUTO: 34.5 PG (ref 26.8–34.3)
MCHC RBC AUTO-ENTMCNC: 34.4 G/DL (ref 31.4–37.4)
MCV RBC AUTO: 101 FL (ref 82–98)
MONOCYTES # BLD AUTO: 0.34 THOUSAND/ΜL (ref 0.17–1.22)
MONOCYTES NFR BLD AUTO: 8 % (ref 4–12)
NEUTROPHILS # BLD AUTO: 1.99 THOUSANDS/ΜL (ref 1.85–7.62)
NEUTS SEG NFR BLD AUTO: 48 % (ref 43–75)
NRBC BLD AUTO-RTO: 0 /100 WBCS
PLATELET # BLD AUTO: 190 THOUSANDS/UL (ref 149–390)
PMV BLD AUTO: 10.2 FL (ref 8.9–12.7)
POTASSIUM SERPL-SCNC: 4 MMOL/L (ref 3.5–5.3)
PROT SERPL-MCNC: 7.4 G/DL (ref 6.4–8.2)
PROTHROMBIN TIME: 12 SECONDS (ref 11.6–14.5)
RBC # BLD AUTO: 4.43 MILLION/UL (ref 3.88–5.62)
RH BLD: NEGATIVE
SODIUM SERPL-SCNC: 142 MMOL/L (ref 136–145)
SPECIMEN EXPIRATION DATE: NORMAL
WBC # BLD AUTO: 4.07 THOUSAND/UL (ref 4.31–10.16)

## 2022-07-14 PROCEDURE — 80053 COMPREHEN METABOLIC PANEL: CPT

## 2022-07-14 PROCEDURE — 36415 COLL VENOUS BLD VENIPUNCTURE: CPT

## 2022-07-14 PROCEDURE — 85610 PROTHROMBIN TIME: CPT

## 2022-07-14 PROCEDURE — 86901 BLOOD TYPING SEROLOGIC RH(D): CPT | Performed by: THORACIC SURGERY (CARDIOTHORACIC VASCULAR SURGERY)

## 2022-07-14 PROCEDURE — 87081 CULTURE SCREEN ONLY: CPT

## 2022-07-14 PROCEDURE — 86900 BLOOD TYPING SEROLOGIC ABO: CPT | Performed by: THORACIC SURGERY (CARDIOTHORACIC VASCULAR SURGERY)

## 2022-07-14 PROCEDURE — 86850 RBC ANTIBODY SCREEN: CPT | Performed by: THORACIC SURGERY (CARDIOTHORACIC VASCULAR SURGERY)

## 2022-07-14 PROCEDURE — 85025 COMPLETE CBC W/AUTO DIFF WBC: CPT

## 2022-07-14 PROCEDURE — 86920 COMPATIBILITY TEST SPIN: CPT

## 2022-07-15 LAB — MRSA NOSE QL CULT: NORMAL

## 2022-07-19 NOTE — PRE-PROCEDURE INSTRUCTIONS
Pre-Surgery Instructions:   Medication Instructions    Ascorbic Acid (VITAMIN C) 100 MG tablet Instructions provided by MD Donald Baker aspirin-dipyridamole (AGGRENOX)  mg per 12 hr capsule Instructions provided by MD    B Complex Vitamins (B COMPLEX 50) TABS Instructions provided by MD    diclofenac sodium (VOLTAREN) 1 % Instructions provided by MD    Garlic 10 MG CAPS Instructions provided by MD    Glucosamine-Chondroit-Vit C-Mn (GLUCOSAMINE 1500 COMPLEX) CAPS Instructions provided by MD Donald Baker hydroxychloroquine (PLAQUENIL) 200 mg tablet Instructions provided by MD    loratadine (CLARITIN) 10 mg tablet Instructions provided by MD Donald Baker Multiple Vitamin-Folic Acid TABS Instructions provided by MD Donald Baker mupirocin (BACTROBAN) 2 % nasal ointment Instructions provided by MD Donald Baker Omega-3 Fatty Acids (FISH OIL) 645 MG CAPS Instructions provided by MD    omeprazole (PriLOSEC) 20 mg delayed release capsule Instructions provided by MD    OXcarbazepine (TRILEPTAL) 150 mg tablet Instructions provided by MD    polyethylene glycol (GLYCOLAX) 17 GM/SCOOP powder Instructions provided by MD    simvastatin (ZOCOR) 10 mg tablet Instructions provided by MD    vitamin E, tocopherol, 400 units capsule Instructions provided by MD   Have you had / have a sore throat? No  Have you had / have a cough less than 1 week? No  Have you had / have a fever greater than 100 0 - 100  4? No  Are you experiencing any shortness of breath? No    Review with patient via phone medications and showering instructions received from surgeon office and verbalize understanding  Advised ASC call with surgery schedule time

## 2022-07-20 RX ORDER — HEPARIN SODIUM 1000 [USP'U]/ML
400 INJECTION, SOLUTION INTRAVENOUS; SUBCUTANEOUS ONCE
Status: CANCELLED | OUTPATIENT
Start: 2022-07-21

## 2022-07-21 ENCOUNTER — APPOINTMENT (INPATIENT)
Dept: RADIOLOGY | Facility: HOSPITAL | Age: 77
DRG: 267 | End: 2022-07-21
Payer: MEDICARE

## 2022-07-21 ENCOUNTER — APPOINTMENT (INPATIENT)
Dept: NON INVASIVE DIAGNOSTICS | Facility: HOSPITAL | Age: 77
DRG: 267 | End: 2022-07-21
Payer: MEDICARE

## 2022-07-21 ENCOUNTER — APPOINTMENT (OUTPATIENT)
Dept: NON INVASIVE DIAGNOSTICS | Facility: HOSPITAL | Age: 77
DRG: 267 | End: 2022-07-21
Attending: THORACIC SURGERY (CARDIOTHORACIC VASCULAR SURGERY)
Payer: MEDICARE

## 2022-07-21 ENCOUNTER — ANESTHESIA (OUTPATIENT)
Dept: PERIOP | Facility: HOSPITAL | Age: 77
DRG: 267 | End: 2022-07-21
Payer: MEDICARE

## 2022-07-21 ENCOUNTER — HOSPITAL ENCOUNTER (INPATIENT)
Facility: HOSPITAL | Age: 77
LOS: 1 days | Discharge: HOME | DRG: 267 | End: 2022-07-22
Attending: THORACIC SURGERY (CARDIOTHORACIC VASCULAR SURGERY) | Admitting: THORACIC SURGERY (CARDIOTHORACIC VASCULAR SURGERY)
Payer: MEDICARE

## 2022-07-21 ENCOUNTER — ANESTHESIA EVENT (OUTPATIENT)
Dept: PERIOP | Facility: HOSPITAL | Age: 77
DRG: 267 | End: 2022-07-21
Payer: MEDICARE

## 2022-07-21 DIAGNOSIS — I35.9 NONRHEUMATIC AORTIC VALVE DISORDER: ICD-10-CM

## 2022-07-21 DIAGNOSIS — I35.0 AORTIC VALVE STENOSIS, ETIOLOGY OF CARDIAC VALVE DISEASE UNSPECIFIED: Primary | ICD-10-CM

## 2022-07-21 DIAGNOSIS — Z95.2 S/P TAVR (TRANSCATHETER AORTIC VALVE REPLACEMENT): ICD-10-CM

## 2022-07-21 DIAGNOSIS — I35.0 AORTIC VALVE STENOSIS, ETIOLOGY OF CARDIAC VALVE DISEASE UNSPECIFIED: ICD-10-CM

## 2022-07-21 DIAGNOSIS — Z95.2 S/P TAVR (TRANSCATHETER AORTIC VALVE REPLACEMENT): Primary | ICD-10-CM

## 2022-07-21 PROBLEM — R41.3 MEMORY LOSS: Status: ACTIVE | Noted: 2022-07-21

## 2022-07-21 LAB
ANION GAP SERPL CALCULATED.3IONS-SCNC: 6 MMOL/L (ref 4–13)
ATRIAL RATE: 65 BPM
BASE EXCESS BLDA CALC-SCNC: -1 MMOL/L (ref -2–3)
BASE EXCESS BLDA CALC-SCNC: 0 MMOL/L (ref -2–3)
BASE EXCESS BLDA CALC-SCNC: 0 MMOL/L (ref -2–3)
BUN SERPL-MCNC: 19 MG/DL (ref 5–25)
CA-I BLD-SCNC: 1.21 MMOL/L (ref 1.12–1.32)
CA-I BLD-SCNC: 1.22 MMOL/L (ref 1.12–1.32)
CA-I BLD-SCNC: 1.3 MMOL/L (ref 1.12–1.32)
CALCIUM SERPL-MCNC: 8.7 MG/DL (ref 8.3–10.1)
CHLORIDE SERPL-SCNC: 110 MMOL/L (ref 96–108)
CO2 SERPL-SCNC: 26 MMOL/L (ref 21–32)
CREAT SERPL-MCNC: 0.78 MG/DL (ref 0.6–1.3)
GFR SERPL CREATININE-BSD FRML MDRD: 88 ML/MIN/1.73SQ M
GLUCOSE SERPL-MCNC: 105 MG/DL (ref 65–140)
GLUCOSE SERPL-MCNC: 107 MG/DL (ref 65–140)
GLUCOSE SERPL-MCNC: 111 MG/DL (ref 65–140)
GLUCOSE SERPL-MCNC: 111 MG/DL (ref 65–140)
GLUCOSE SERPL-MCNC: 113 MG/DL (ref 65–140)
GLUCOSE SERPL-MCNC: 127 MG/DL (ref 65–140)
GLUCOSE SERPL-MCNC: 149 MG/DL (ref 65–140)
HCO3 BLDA-SCNC: 25.6 MMOL/L (ref 22–28)
HCO3 BLDA-SCNC: 26.1 MMOL/L (ref 22–28)
HCO3 BLDA-SCNC: 27.1 MMOL/L (ref 22–28)
HCT VFR BLD AUTO: 43.1 % (ref 36.5–49.3)
HCT VFR BLD CALC: 40 % (ref 36.5–49.3)
HCT VFR BLD CALC: 40 % (ref 36.5–49.3)
HCT VFR BLD CALC: 44 % (ref 36.5–49.3)
HGB BLD-MCNC: 14.4 G/DL (ref 12–17)
HGB BLDA-MCNC: 13.6 G/DL (ref 12–17)
HGB BLDA-MCNC: 13.6 G/DL (ref 12–17)
HGB BLDA-MCNC: 15 G/DL (ref 12–17)
KCT BLD-ACNC: 110 SEC (ref 89–137)
KCT BLD-ACNC: 122 SEC (ref 89–137)
KCT BLD-ACNC: 309 SEC (ref 89–137)
P AXIS: 45 DEGREES
PCO2 BLD: 27 MMOL/L (ref 21–32)
PCO2 BLD: 28 MMOL/L (ref 21–32)
PCO2 BLD: 29 MMOL/L (ref 21–32)
PCO2 BLD: 46.2 MM HG (ref 36–44)
PCO2 BLD: 53.6 MM HG (ref 36–44)
PCO2 BLD: 53.8 MM HG (ref 36–44)
PH BLD: 7.29 [PH] (ref 7.35–7.45)
PH BLD: 7.31 [PH] (ref 7.35–7.45)
PH BLD: 7.35 [PH] (ref 7.35–7.45)
PLATELET # BLD AUTO: 158 THOUSANDS/UL (ref 149–390)
PMV BLD AUTO: 9.9 FL (ref 8.9–12.7)
PO2 BLD: 144 MM HG (ref 75–129)
PO2 BLD: 146 MM HG (ref 75–129)
PO2 BLD: 49 MM HG (ref 75–129)
POTASSIUM BLD-SCNC: 3.8 MMOL/L (ref 3.5–5.3)
POTASSIUM BLD-SCNC: 3.9 MMOL/L (ref 3.5–5.3)
POTASSIUM BLD-SCNC: 4.1 MMOL/L (ref 3.5–5.3)
POTASSIUM SERPL-SCNC: 3.8 MMOL/L (ref 3.5–5.3)
PR INTERVAL: 208 MS
QRS AXIS: -7 DEGREES
QRSD INTERVAL: 79 MS
QT INTERVAL: 438 MS
QTC INTERVAL: 456 MS
SAO2 % BLD FROM PO2: 80 % (ref 60–85)
SAO2 % BLD FROM PO2: 99 % (ref 60–85)
SAO2 % BLD FROM PO2: 99 % (ref 60–85)
SODIUM BLD-SCNC: 140 MMOL/L (ref 136–145)
SODIUM BLD-SCNC: 142 MMOL/L (ref 136–145)
SODIUM BLD-SCNC: 143 MMOL/L (ref 136–145)
SODIUM SERPL-SCNC: 142 MMOL/L (ref 135–147)
SPECIMEN SOURCE: ABNORMAL
SPECIMEN SOURCE: NORMAL
SPECIMEN SOURCE: NORMAL
T WAVE AXIS: 39 DEGREES
VENTRICULAR RATE: 65 BPM

## 2022-07-21 PROCEDURE — 84132 ASSAY OF SERUM POTASSIUM: CPT

## 2022-07-21 PROCEDURE — 93355 ECHO TRANSESOPHAGEAL (TEE): CPT

## 2022-07-21 PROCEDURE — C1769 GUIDE WIRE: HCPCS | Performed by: THORACIC SURGERY (CARDIOTHORACIC VASCULAR SURGERY)

## 2022-07-21 PROCEDURE — C1781 MESH (IMPLANTABLE): HCPCS | Performed by: THORACIC SURGERY (CARDIOTHORACIC VASCULAR SURGERY)

## 2022-07-21 PROCEDURE — NC001 PR NO CHARGE: Performed by: PHYSICIAN ASSISTANT

## 2022-07-21 PROCEDURE — C1894 INTRO/SHEATH, NON-LASER: HCPCS | Performed by: THORACIC SURGERY (CARDIOTHORACIC VASCULAR SURGERY)

## 2022-07-21 PROCEDURE — 80048 BASIC METABOLIC PNL TOTAL CA: CPT | Performed by: PHYSICIAN ASSISTANT

## 2022-07-21 PROCEDURE — 02HV33Z INSERTION OF INFUSION DEVICE INTO SUPERIOR VENA CAVA, PERCUTANEOUS APPROACH: ICD-10-PCS | Performed by: ANESTHESIOLOGY

## 2022-07-21 PROCEDURE — 99223 1ST HOSP IP/OBS HIGH 75: CPT | Performed by: INTERNAL MEDICINE

## 2022-07-21 PROCEDURE — 02RF38Z REPLACEMENT OF AORTIC VALVE WITH ZOOPLASTIC TISSUE, PERCUTANEOUS APPROACH: ICD-10-PCS | Performed by: THORACIC SURGERY (CARDIOTHORACIC VASCULAR SURGERY)

## 2022-07-21 PROCEDURE — 33361 REPLACE AORTIC VALVE PERQ: CPT | Performed by: THORACIC SURGERY (CARDIOTHORACIC VASCULAR SURGERY)

## 2022-07-21 PROCEDURE — 33361 REPLACE AORTIC VALVE PERQ: CPT | Performed by: INTERNAL MEDICINE

## 2022-07-21 PROCEDURE — 82948 REAGENT STRIP/BLOOD GLUCOSE: CPT

## 2022-07-21 PROCEDURE — 85049 AUTOMATED PLATELET COUNT: CPT | Performed by: PHYSICIAN ASSISTANT

## 2022-07-21 PROCEDURE — 82803 BLOOD GASES ANY COMBINATION: CPT

## 2022-07-21 PROCEDURE — 84295 ASSAY OF SERUM SODIUM: CPT

## 2022-07-21 PROCEDURE — C1760 CLOSURE DEV, VASC: HCPCS | Performed by: THORACIC SURGERY (CARDIOTHORACIC VASCULAR SURGERY)

## 2022-07-21 PROCEDURE — 93010 ELECTROCARDIOGRAM REPORT: CPT | Performed by: INTERNAL MEDICINE

## 2022-07-21 PROCEDURE — 82330 ASSAY OF CALCIUM: CPT

## 2022-07-21 PROCEDURE — 93306 TTE W/DOPPLER COMPLETE: CPT

## 2022-07-21 PROCEDURE — 85018 HEMOGLOBIN: CPT | Performed by: PHYSICIAN ASSISTANT

## 2022-07-21 PROCEDURE — 93005 ELECTROCARDIOGRAM TRACING: CPT

## 2022-07-21 PROCEDURE — 85014 HEMATOCRIT: CPT | Performed by: PHYSICIAN ASSISTANT

## 2022-07-21 PROCEDURE — 82947 ASSAY GLUCOSE BLOOD QUANT: CPT

## 2022-07-21 PROCEDURE — 85347 COAGULATION TIME ACTIVATED: CPT

## 2022-07-21 PROCEDURE — 71045 X-RAY EXAM CHEST 1 VIEW: CPT

## 2022-07-21 PROCEDURE — 85014 HEMATOCRIT: CPT

## 2022-07-21 PROCEDURE — C1751 CATH, INF, PER/CENT/MIDLINE: HCPCS | Performed by: THORACIC SURGERY (CARDIOTHORACIC VASCULAR SURGERY)

## 2022-07-21 DEVICE — PERCLOSE™ PROSTYLE™ SUTURE-MEDIATED CLOSURE AND REPAIR SYSTEM
Type: IMPLANTABLE DEVICE | Site: GROIN | Status: FUNCTIONAL
Brand: PERCLOSE™ PROSTYLE™

## 2022-07-21 DEVICE — TAVR SAPIEN 3 CMNDR DLV SYS 29MM: Type: IMPLANTABLE DEVICE | Site: HEART | Status: FUNCTIONAL

## 2022-07-21 RX ORDER — ACETAMINOPHEN 650 MG/1
650 SUPPOSITORY RECTAL EVERY 4 HOURS PRN
Status: DISCONTINUED | OUTPATIENT
Start: 2022-07-21 | End: 2022-07-22 | Stop reason: HOSPADM

## 2022-07-21 RX ORDER — ONDANSETRON 2 MG/ML
4 INJECTION INTRAMUSCULAR; INTRAVENOUS ONCE AS NEEDED
Status: DISCONTINUED | OUTPATIENT
Start: 2022-07-21 | End: 2022-07-21 | Stop reason: HOSPADM

## 2022-07-21 RX ORDER — CLOPIDOGREL BISULFATE 75 MG/1
75 TABLET ORAL DAILY
Status: DISCONTINUED | OUTPATIENT
Start: 2022-07-21 | End: 2022-07-22 | Stop reason: HOSPADM

## 2022-07-21 RX ORDER — ONDANSETRON 2 MG/ML
INJECTION INTRAMUSCULAR; INTRAVENOUS AS NEEDED
Status: DISCONTINUED | OUTPATIENT
Start: 2022-07-21 | End: 2022-07-21

## 2022-07-21 RX ORDER — PANTOPRAZOLE SODIUM 40 MG/1
40 TABLET, DELAYED RELEASE ORAL
Status: DISCONTINUED | OUTPATIENT
Start: 2022-07-22 | End: 2022-07-22 | Stop reason: HOSPADM

## 2022-07-21 RX ORDER — SODIUM CHLORIDE 9 MG/ML
INJECTION, SOLUTION INTRAVENOUS CONTINUOUS PRN
Status: DISCONTINUED | OUTPATIENT
Start: 2022-07-21 | End: 2022-07-21

## 2022-07-21 RX ORDER — CHLORHEXIDINE GLUCONATE 0.12 MG/ML
15 RINSE ORAL ONCE
Status: COMPLETED | OUTPATIENT
Start: 2022-07-21 | End: 2022-07-21

## 2022-07-21 RX ORDER — INSULIN LISPRO 100 [IU]/ML
1-5 INJECTION, SOLUTION INTRAVENOUS; SUBCUTANEOUS
Status: DISCONTINUED | OUTPATIENT
Start: 2022-07-21 | End: 2022-07-22 | Stop reason: HOSPADM

## 2022-07-21 RX ORDER — BISACODYL 10 MG
10 SUPPOSITORY, RECTAL RECTAL DAILY PRN
Status: DISCONTINUED | OUTPATIENT
Start: 2022-07-21 | End: 2022-07-22 | Stop reason: HOSPADM

## 2022-07-21 RX ORDER — ACETAMINOPHEN 325 MG/1
650 TABLET ORAL EVERY 4 HOURS PRN
Status: DISCONTINUED | OUTPATIENT
Start: 2022-07-21 | End: 2022-07-22 | Stop reason: HOSPADM

## 2022-07-21 RX ORDER — LORATADINE 10 MG/1
10 TABLET ORAL DAILY
Status: DISCONTINUED | OUTPATIENT
Start: 2022-07-21 | End: 2022-07-22 | Stop reason: HOSPADM

## 2022-07-21 RX ORDER — CEFAZOLIN SODIUM 2 G/50ML
2000 SOLUTION INTRAVENOUS ONCE
Status: COMPLETED | OUTPATIENT
Start: 2022-07-21 | End: 2022-07-21

## 2022-07-21 RX ORDER — POLYETHYLENE GLYCOL 3350 17 G/17G
17 POWDER, FOR SOLUTION ORAL DAILY
Status: DISCONTINUED | OUTPATIENT
Start: 2022-07-21 | End: 2022-07-22 | Stop reason: HOSPADM

## 2022-07-21 RX ORDER — SUCCINYLCHOLINE/SOD CL,ISO/PF 100 MG/5ML
SYRINGE (ML) INTRAVENOUS AS NEEDED
Status: DISCONTINUED | OUTPATIENT
Start: 2022-07-21 | End: 2022-07-21

## 2022-07-21 RX ORDER — CHLORHEXIDINE GLUCONATE 0.12 MG/ML
15 RINSE ORAL 2 TIMES DAILY
Status: DISCONTINUED | OUTPATIENT
Start: 2022-07-21 | End: 2022-07-22 | Stop reason: HOSPADM

## 2022-07-21 RX ORDER — SODIUM CHLORIDE, SODIUM LACTATE, POTASSIUM CHLORIDE, CALCIUM CHLORIDE 600; 310; 30; 20 MG/100ML; MG/100ML; MG/100ML; MG/100ML
125 INJECTION, SOLUTION INTRAVENOUS CONTINUOUS
Status: CANCELLED | OUTPATIENT
Start: 2022-07-21

## 2022-07-21 RX ORDER — PROPOFOL 10 MG/ML
INJECTION, EMULSION INTRAVENOUS AS NEEDED
Status: DISCONTINUED | OUTPATIENT
Start: 2022-07-21 | End: 2022-07-21

## 2022-07-21 RX ORDER — PRAVASTATIN SODIUM 40 MG
40 TABLET ORAL
Status: DISCONTINUED | OUTPATIENT
Start: 2022-07-21 | End: 2022-07-22 | Stop reason: HOSPADM

## 2022-07-21 RX ORDER — FENTANYL CITRATE/PF 50 MCG/ML
25 SYRINGE (ML) INJECTION
Status: DISCONTINUED | OUTPATIENT
Start: 2022-07-21 | End: 2022-07-21 | Stop reason: HOSPADM

## 2022-07-21 RX ORDER — ONDANSETRON 2 MG/ML
4 INJECTION INTRAMUSCULAR; INTRAVENOUS EVERY 6 HOURS PRN
Status: DISCONTINUED | OUTPATIENT
Start: 2022-07-21 | End: 2022-07-22 | Stop reason: HOSPADM

## 2022-07-21 RX ORDER — INSULIN LISPRO 100 [IU]/ML
1-6 INJECTION, SOLUTION INTRAVENOUS; SUBCUTANEOUS
Status: DISCONTINUED | OUTPATIENT
Start: 2022-07-21 | End: 2022-07-22 | Stop reason: HOSPADM

## 2022-07-21 RX ORDER — HYDROXYCHLOROQUINE SULFATE 200 MG/1
200 TABLET, FILM COATED ORAL 2 TIMES DAILY
Status: DISCONTINUED | OUTPATIENT
Start: 2022-07-21 | End: 2022-07-22 | Stop reason: HOSPADM

## 2022-07-21 RX ORDER — ESMOLOL HYDROCHLORIDE 10 MG/ML
INJECTION INTRAVENOUS AS NEEDED
Status: DISCONTINUED | OUTPATIENT
Start: 2022-07-21 | End: 2022-07-21

## 2022-07-21 RX ORDER — ROCURONIUM BROMIDE 10 MG/ML
INJECTION, SOLUTION INTRAVENOUS AS NEEDED
Status: DISCONTINUED | OUTPATIENT
Start: 2022-07-21 | End: 2022-07-21

## 2022-07-21 RX ORDER — DEXAMETHASONE SODIUM PHOSPHATE 10 MG/ML
INJECTION, SOLUTION INTRAMUSCULAR; INTRAVENOUS AS NEEDED
Status: DISCONTINUED | OUTPATIENT
Start: 2022-07-21 | End: 2022-07-21

## 2022-07-21 RX ORDER — ASPIRIN 81 MG/1
81 TABLET, CHEWABLE ORAL DAILY
Status: DISCONTINUED | OUTPATIENT
Start: 2022-07-21 | End: 2022-07-22 | Stop reason: HOSPADM

## 2022-07-21 RX ORDER — OXCARBAZEPINE 150 MG/1
150 TABLET, FILM COATED ORAL 2 TIMES DAILY
Status: DISCONTINUED | OUTPATIENT
Start: 2022-07-21 | End: 2022-07-22 | Stop reason: HOSPADM

## 2022-07-21 RX ORDER — SODIUM CHLORIDE, SODIUM LACTATE, POTASSIUM CHLORIDE, CALCIUM CHLORIDE 600; 310; 30; 20 MG/100ML; MG/100ML; MG/100ML; MG/100ML
INJECTION, SOLUTION INTRAVENOUS CONTINUOUS PRN
Status: DISCONTINUED | OUTPATIENT
Start: 2022-07-21 | End: 2022-07-21

## 2022-07-21 RX ORDER — HEPARIN SODIUM 5000 [USP'U]/ML
5000 INJECTION, SOLUTION INTRAVENOUS; SUBCUTANEOUS EVERY 8 HOURS SCHEDULED
Status: DISCONTINUED | OUTPATIENT
Start: 2022-07-22 | End: 2022-07-22 | Stop reason: HOSPADM

## 2022-07-21 RX ORDER — HEPARIN SODIUM 1000 [USP'U]/ML
INJECTION, SOLUTION INTRAVENOUS; SUBCUTANEOUS AS NEEDED
Status: DISCONTINUED | OUTPATIENT
Start: 2022-07-21 | End: 2022-07-21

## 2022-07-21 RX ORDER — PROTAMINE SULFATE 10 MG/ML
INJECTION, SOLUTION INTRAVENOUS AS NEEDED
Status: DISCONTINUED | OUTPATIENT
Start: 2022-07-21 | End: 2022-07-21

## 2022-07-21 RX ORDER — SODIUM CHLORIDE, SODIUM GLUCONATE, SODIUM ACETATE, POTASSIUM CHLORIDE, MAGNESIUM CHLORIDE, SODIUM PHOSPHATE, DIBASIC, AND POTASSIUM PHOSPHATE .53; .5; .37; .037; .03; .012; .00082 G/100ML; G/100ML; G/100ML; G/100ML; G/100ML; G/100ML; G/100ML
50 INJECTION, SOLUTION INTRAVENOUS CONTINUOUS
Status: DISPENSED | OUTPATIENT
Start: 2022-07-21 | End: 2022-07-21

## 2022-07-21 RX ORDER — CEFAZOLIN SODIUM 2 G/50ML
2000 SOLUTION INTRAVENOUS EVERY 8 HOURS
Status: COMPLETED | OUTPATIENT
Start: 2022-07-21 | End: 2022-07-22

## 2022-07-21 RX ADMIN — ROCURONIUM BROMIDE 40 MG: 50 INJECTION INTRAVENOUS at 10:07

## 2022-07-21 RX ADMIN — SODIUM CHLORIDE, SODIUM GLUCONATE, SODIUM ACETATE, POTASSIUM CHLORIDE, MAGNESIUM CHLORIDE, SODIUM PHOSPHATE, DIBASIC, AND POTASSIUM PHOSPHATE 50 ML/HR: .53; .5; .37; .037; .03; .012; .00082 INJECTION, SOLUTION INTRAVENOUS at 11:10

## 2022-07-21 RX ADMIN — LORATADINE 10 MG: 10 TABLET ORAL at 15:57

## 2022-07-21 RX ADMIN — CHLORHEXIDINE GLUCONATE 15 ML: 1.2 SOLUTION ORAL at 21:24

## 2022-07-21 RX ADMIN — CEFAZOLIN SODIUM 2000 MG: 2 SOLUTION INTRAVENOUS at 17:36

## 2022-07-21 RX ADMIN — ESMOLOL HYDROCHLORIDE 100 MG: 100 INJECTION, SOLUTION INTRAVENOUS at 10:07

## 2022-07-21 RX ADMIN — SODIUM CHLORIDE, SODIUM GLUCONATE, SODIUM ACETATE, POTASSIUM CHLORIDE, MAGNESIUM CHLORIDE, SODIUM PHOSPHATE, DIBASIC, AND POTASSIUM PHOSPHATE 50 ML/HR: .53; .5; .37; .037; .03; .012; .00082 INJECTION, SOLUTION INTRAVENOUS at 19:34

## 2022-07-21 RX ADMIN — PROPOFOL 50 MG: 10 INJECTION, EMULSION INTRAVENOUS at 10:13

## 2022-07-21 RX ADMIN — DEXAMETHASONE SODIUM PHOSPHATE 10 MG: 10 INJECTION, SOLUTION INTRAMUSCULAR; INTRAVENOUS at 10:10

## 2022-07-21 RX ADMIN — OXCARBAZEPINE 150 MG: 150 TABLET, FILM COATED ORAL at 17:37

## 2022-07-21 RX ADMIN — SODIUM CHLORIDE: 0.9 INJECTION, SOLUTION INTRAVENOUS at 10:09

## 2022-07-21 RX ADMIN — HYDROXYCHLOROQUINE SULFATE 200 MG: 200 TABLET ORAL at 17:37

## 2022-07-21 RX ADMIN — HEPARIN SODIUM 16000 UNITS: 1000 INJECTION INTRAVENOUS; SUBCUTANEOUS at 10:39

## 2022-07-21 RX ADMIN — ASPIRIN 81 MG CHEWABLE TABLET 81 MG: 81 TABLET CHEWABLE at 11:57

## 2022-07-21 RX ADMIN — Medication 100 MG: at 10:07

## 2022-07-21 RX ADMIN — SUGAMMADEX 191 MG: 100 INJECTION, SOLUTION INTRAVENOUS at 11:00

## 2022-07-21 RX ADMIN — NICARDIPINE HYDROCHLORIDE 5 MG/HR: 2.5 INJECTION, SOLUTION INTRAVENOUS at 10:49

## 2022-07-21 RX ADMIN — NICARDIPINE HYDROCHLORIDE 2.5 MG/HR: 2.5 INJECTION, SOLUTION INTRAVENOUS at 15:22

## 2022-07-21 RX ADMIN — PROTAMINE SULFATE 110 MG: 10 INJECTION, SOLUTION INTRAVENOUS at 10:46

## 2022-07-21 RX ADMIN — CEFAZOLIN SODIUM 2000 MG: 2 SOLUTION INTRAVENOUS at 10:10

## 2022-07-21 RX ADMIN — MUPIROCIN 1 APPLICATION: 20 OINTMENT TOPICAL at 09:03

## 2022-07-21 RX ADMIN — CHLORHEXIDINE GLUCONATE 15 ML: 1.2 SOLUTION ORAL at 09:03

## 2022-07-21 RX ADMIN — NICARDIPINE HYDROCHLORIDE 5 MG/HR: 2.5 INJECTION, SOLUTION INTRAVENOUS at 11:09

## 2022-07-21 RX ADMIN — MUPIROCIN 1 APPLICATION: 20 OINTMENT TOPICAL at 21:24

## 2022-07-21 RX ADMIN — SODIUM CHLORIDE, SODIUM LACTATE, POTASSIUM CHLORIDE, AND CALCIUM CHLORIDE: .6; .31; .03; .02 INJECTION, SOLUTION INTRAVENOUS at 10:01

## 2022-07-21 RX ADMIN — CLOPIDOGREL BISULFATE 75 MG: 75 TABLET ORAL at 11:57

## 2022-07-21 RX ADMIN — ONDANSETRON 4 MG: 2 INJECTION INTRAMUSCULAR; INTRAVENOUS at 10:10

## 2022-07-21 RX ADMIN — PRAVASTATIN SODIUM 40 MG: 40 TABLET ORAL at 15:57

## 2022-07-21 NOTE — RESPIRATORY THERAPY NOTE
RT Protocol Note  Beth Tello 78 y o  male MRN: [id number]  Unit/Bed#: [facility] 401-01 Encounter: [id number]    Assessment    Principal Problem:    S/P TAVR (transcatheter aortic valve replacement)  Active Problems:    Dyslipidemia    Seizure disorder ([facility] )    Medicare annual wellness visit, subsequent    Rheumatoid arthritis of multiple sites with negative rheumatoid factor ([facility] )    Aortic valve stenosis    Memory loss      Home Pulmonary Medications:  none  Home Devices/Therapy:  (none)    Past Medical History:   Diagnosis Date    Arthritis     Concussion     Elevated PSA     Hyperlipidemia     Murmur, heart 2021    Seizure (HCC)     TIA (transient ischemic attack)     Traumatic brain injury [facility])      Social History     Socioeconomic History    Marital status: /Civil Union     Spouse name: None    Number of children: None    Years of education: None    Highest education level: None   Occupational History    None   Tobacco Use    Smoking status: Never Smoker    Smokeless tobacco: Never Used   Vaping Use    Vaping Use: Never used   Substance and Sexual Activity    Alcohol use: Yes     Comment: once a day    Drug use: Not Currently    Sexual activity: None   Other Topics Concern    None   Social History Narrative    None     Social Determinants of Health     Financial Resource Strain: Not on file   Food Insecurity: Not on file   Transportation Needs: Not on file   Physical Activity: Not on file   Stress: Not on file   Social Connections: Not on file   Intimate Partner Violence: Not on file   Housing Stability: Not on file       Subjective         Objective    Physical Exam:   Assessment Type: Assess only  General Appearance: Alert, Awake  Respiratory Pattern: Normal  Chest Assessment: Chest expansion symmetrical  Bilateral Breath Sounds: Clear  O2 Device: room air    Vitals:  Blood pressure 115/66, pulse (!) 54, temperature (!) 96 9 °F (36 1 °C), resp   rate 12, height 5' 11 5" (1 816 m), weight 95 3 kg (210 lb 3 2 oz), SpO2 95 %  Imaging and other studies: I have personally reviewed pertinent reports  O2 Device: room air     Plan    Respiratory Plan: No distress/Pulmonary history  Airway Clearance Plan: Incentive Spirometer     Resp Comments: (P) Pt assessed S/P TAVR per protocol, OZ=3627 no hx of pulm rx  BS CTA  RA at 96% appears comfortable and in no distress  No bronchodialator tx at this time  Will continue to monitor

## 2022-07-21 NOTE — INTERVAL H&P NOTE
H&P reviewed  After examining the patient I find no changes in the patients condition since the H&P had been written  There were no vitals filed for this visit  Anticipated Length of Stay:  Patient will be admitted on an Inpatient basis with an anticipated length of stay of  greater than 2 midnights  Justification for Hospital Stay:  Post surgical recovery following open heart surgery

## 2022-07-21 NOTE — ANESTHESIA PROCEDURE NOTES
Procedure Performed: TRISH Anesthesia  Start Time:  7/21/2022 10:17 AM        Preanesthesia Checklist    Patient identified, IV assessed, risks and benefits discussed, monitors and equipment assessed, procedure being performed at surgeon's request and anesthesia consent obtained  Procedure    Diagnostic Indications for TRISH:  assessment of surgical repair and hemodynamic monitoring  Type of TRISH: interventional TRISH with real time guidance of intracardiac procedure  Images Saved: ultrasound permanent image saved  Physician Requesting Echo: Mague Hunter DO  Location performed: OR  Intubated  Bite block not placed  Heart visualized  Insertion of TRISH Probe:  Atraumatic  Probe Type:  Multiplane  Modalities:  3D, color flow mapping, continuous wave Doppler and pulse wave Doppler  Echocardiographic and Doppler Measurements    PREPROCEDURE    LEFT VENTRICLE:  Systolic Function: normal  Ejection Fraction: 60%  Cavity size: normal        RIGHT VENTRICLE:  Systolic Function: normal   Cavity size normal  No hypertrophy              AORTIC VALVE:  Leaflets: trileaflet  Leaflet motions restricted  Stenosis: severe  Regurgitation: none  MITRAL VALVE:  Leaflets: normal  Leaflet Motions: normal  Regurgitation: trace  Stenosis: none  TRICUSPID VALVE:  Leaflets: normal  Leaflet Motions: normal  Stenosis: none  Regurgitation: trace  PULMONIC VALVE:  Leaflets: normal  Regurgitation: none  Stenosis: none  ASCENDING AORTA:  Size:  normal   Dissection not present  AORTIC ARCH:  Size:  normal   dissection not present  Grade 2: severe intimal thickening without protruding atheroma  DESCENDING AORTA:  Size: normal   Dissection not present  Grade 2: severe intimal thickening without protruding atheroma          RIGHT ATRIUM:  Size:  normal      LEFT ATRIUM:  Size: normal      LEFT ATRIAL APPENDAGE:  Size: normal           ATRIAL SEPTUM:  Intra-atrial septal morphology: normal  VENTRICULAR SEPTUM:  Intra-ventricular septum morphology: normal              OTHER FINDINGS:  Pericardium:  normal  Pleural Effusion:  none  POSTPROCEDURE    LEFT VENTRICLE: Unchanged   RIGHT VENTRICLE: Unchanged   AORTIC VALVE:   Leaflets: bioprosthetic  Stenosis: none  Mean Gradient: 2 mmHg  Regurgitation: trace  Valve Size: 29 mm  MITRAL VALVE: Unchanged   TRICUSPID VALVE: Unchanged   PULMONIC VALVE: Unchanged             ATRIA: Unchanged   AORTA: Unchanged   REMOVAL:  Probe Removal: atraumatic

## 2022-07-21 NOTE — ANESTHESIA PROCEDURE NOTES
Central Line Insertion  Performed by: Annis Bamberger, MD  Authorized by: Annis Bamberger, MD     Date/Time: 7/21/2022 10:15 AM  Catheter Type:  triple lumen  Consent: Written consent obtained    Consent given by: patient  Patient understanding: patient states understanding of the procedure being performed  Patient consent: the patient's understanding of the procedure matches consent given  Procedure consent: procedure consent matches procedure scheduled  Patient identity confirmed: arm band and hospital-assigned identification number  Indications: central pressure monitoring and vascular access  Location details: right internal jugular  Catheter size: 7 Fr  Patient position: Trendelenburg  Assessment: blood return through all ports  Preparation: skin prepped with 2% chlorhexidine and skin prepped with ChloraPrep  Skin prep agent dried: skin prep agent completely dried prior to procedure  Sterile barriers: all five maximum sterile barriers used - cap, mask, sterile gown, sterile gloves, and large sterile sheet  Hand hygiene: hand hygiene performed prior to central venous catheter insertion  Ultrasound guidance: yes  sterile gel and probe cover used in ultrasound-guided central venous catheter insertionultrasound permanent image saved  Pre-procedure: landmarks identified  Number of attempts: 1  Successful placement: yes  Post-procedure: line sutured and dressing applied  Patient tolerance: Patient tolerated the procedure well with no immediate complications

## 2022-07-21 NOTE — OP NOTE
OPERATIVE REPORT  PATIENT NAME: Vanessa Kaur    :  1945  MRN: [id number]  Pt Location: [facility] OR [facility] 02    SURGERY DATE: 2022    SURGEON: Mague Hunter DO    CO-SURGEON: Maria Elena Landeros MD    ASSISTANT: Kwasi Parham PA-C    ADDITIONAL ASSISTANT: N/A    PREOPERATIVE DIAGNOSIS: Symptomatic severe aortic stenosis  POSTOPERATIVE DIAGNOSIS: Symptomatic severe aortic stenosis  NYHA Class: 3  CCS Class: 3    PROCEDURE:   Transcatheter aortic valve replacement with a 29 mm Jay RADU 3 bioprosthetic valve via left transfemoral approach  CARDIOPULMONARY BYPASS TIME: 0 minutes  ANESTHESIA Dr Phil Ayala, general endotracheal anesthesia with transesophageal echocardiogram guidance  INDICATIONS:  The patient is a 78y o  year-old male with clinical and echocardiographic findings consistent with severe aortic stenosis  FINDINGS:  1  Intraoperative transesophageal echocardiogram revealed severe aortic stenosis  2  Final transesophageal echocardiogram demonstrated prosthetic valve with normal function trace perivalvular leak  OPERATIVE TECHNIQUE:    The patient was taken to the operating room and placed supine on the operating table  Following the satisfactory induction of general anesthesia and placement of monitoring lines, the patient was prepped and draped in the usual sterile fashion  A time-out procedure was performed  The left common femoral artery and left common femoral vein were accessed percutaneously using Seldinger technique and fluoroscopy  Through the left common femoral vein sheath, a balloon-tip temporary pacing catheter was inserted and advanced into the right ventricle and its capture was confirmed  The left common femoral artery was accessed percutaneously using Seldinger technique and fluoroscopy  Two (2) Perclose sutures were deployed in the standard fashion  The patient was systemically heparinized    A pigtail catheter was inserted and advanced to the right coronary cusp, an aortogram was performed to determine proper angle and orientation for valve deployment   The left common femoral artery was then accessed, a Floorball Gearerquist extra-stiff wire was positioned in the ascending aorta over an exchange catheter  The sheath for the delivery system was inserted through the left common femoral artery and advanced into the aorta  A 6 Albanian Caesar right 4 coronary catheter was advanced through the delivery sheath to the aortic valve  The aortic valve was crossed with a 0 035 straight-tip wire, the right coronary catheter was advanced into the left ventricular cavity, this was then exchanged for a curved tip Amplatzer extra stiff wire  A 29 mm RADU 3 valve delivery system was advanced through the delivery sheath into the aorta, the delivery system was configured for deployment  The valve on the delivery system was then advanced and the aortic valve was crossed  At this point, the catheter was desheathed in the standard fashion  The valve was positioned appropriately using a combination of fluoroscopy and transesophageal echocardiogram guidance  During an episode of rapid pacing, balloon deployment of the valve was performed  Following deployment, the position of the valve was confirmed by fluoroscopy and echocardiography and its position appeared appropriate with trace perivalvular leak  The valve delivery system was subsequently removed followed by removal of the sheath while the Perclose sutures were secured and direct pressure was held  Protamine was administered with normalization of the ACT  Pressure was released, without evidence of active bleeding  The left common femoral artery sheath was removed followed by deployment of a closure device  The left common femoral vein sheath was removed and pressure was held  COMPLICATIONS: None    PACKS/TUBES/DRAINS: None  EBL: Minimal     TRANSFUSION: None       SPECIMENS: None      As the attending surgeon, I was present and scrubbed for all critical portions of this procedure  There was no qualified surgical resident available  Sponge, needle and instrument counts were reported as correct by the nursing staff       SIGNATURE: Ovi Mills DO  DATE: July 21, 2022  TIME: 10:54 AM

## 2022-07-21 NOTE — ANESTHESIA PROCEDURE NOTES
Arterial Line Insertion  Performed by: Denver Ochoa, MD  Authorized by: Denver Ochoa, MD   Consent: Written consent obtained  Consent given by: patient  Patient understanding: patient states understanding of the procedure being performed  Patient consent: the patient's understanding of the procedure matches consent given  Procedure consent: procedure consent matches procedure scheduled  Patient identity confirmed: arm band and hospital-assigned identification number  Preparation: Patient was prepped and draped in the usual sterile fashion    Orientation:  Left  Location: radial artery  Procedure Details:  Needle gauge: 18  Seldinger technique: Seldinger technique used  Number of attempts: 1    Post-procedure:  Post-procedure: dressing applied  Waveform: good waveform and waveform confirmed  Post-procedure CNS: normal and unchanged

## 2022-07-21 NOTE — ANESTHESIA PREPROCEDURE EVALUATION
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)      NEURO/PSYCH   (+) Cerebrovascular accident ([facility] [address] )   (+) Seizure disorder ([facility] [address] )      PULMONARY   (+) DICKERSON (dyspnea on exertion)        Physical Exam    Airway    Mallampati score: II         Dental   No notable dental hx     Cardiovascular      Pulmonary      Other Findings        Anesthesia Plan  ASA Score- 4     Anesthesia Type- general with ASA Monitors  Additional Monitors: arterial line and central venous line  Airway Plan: ETT  Comment: I, Dr Daron Kimball, the attending physician, have personally seen and evaluated the patient prior to anesthetic care  I have reviewed the pre-anesthetic record, and other medical records if appropriate to the anesthetic care  If a CRNA is involved in the case, I have reviewed the CRNA assessment, if present, and agree  The patient is in a suitable condition to proceed with my formulated anesthetic plan          Plan Factors-    Chart reviewed  Induction- intravenous  Postoperative Plan-     Informed Consent- Anesthetic plan and risks discussed with patient  I personally reviewed this patient with the CRNA  Discussed and agreed on the Anesthesia Plan with the CRNA  Hernan Escobar

## 2022-07-21 NOTE — CONSULTS
Consultation - Cardiology Team One  Tami Martinez 78 y o  male MRN: [id number]  Unit/Bed#: [facility] 401-01 Encounter: [id number]    Inpatient consult to Cardiology  Consult performed by: Flor Alarcon PA-C  Consult ordered by: Raquel Page PA-C          Physician Requesting Consult: Cris Sauceda DO  Reason for Consult / Principal Problem: Aevere AS    Assessment:    1  Severe AS: s/p Left TF TAVR  POD #0  · Final intraoperative TRISH: EF 60% normally functioning TAVR, trace PVL  · Hemodynamics: Currently on nicardipine gtt at 2 5 mg/hr  · On aspirin and plavix  2  Preserved Biventricular Systolic Function: Final intraoperative TRISH EF 60%  3  Hyperlipidemia: Lipid panel 10/2021 , , HDL 59, LDL 85 on simvastatin 10 mg daily switched to pravastatin 40 mg this admission  Plan/Recommendations:  · Follow up on echocardiogram  · Wean nicardipine as able  · Continue remaining cardiac medications  · Encourage ambulation and incentive spirometry when able  · Follow up with GATO Kunz on 8/9/2022  ____________________________________________________________    CC: DICKERSON      History of Present Illness   HPI: Tami Martinez is a 78y o  year old male who has hyperlipidemia, 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 who was evaluated by cardiologist Dr Demetrice Boudreaux for murmur noted on exam in 11/2021  He was found to have severe AS with MG 42 and ELMER 0 8 cm2  He was asymptomatic at the time therefore it was monitored  At last office visit 4/2022 patient c/o dyspnea  Repeat echocardiogram 4/19/2022 revealed preserved biventricular function with severe AS  He was referred to the valve clinic for evaluation for SAVR vs TAVR  Cardiac cath revealed no obstructive CAD  Patient was ultimately recommended for TAVR  After routine preoperative testing patient presented 7/21/2022 and underwent successful left TF TAVR with a 29 mm Jay Jason 3 bioprosthetic valve   Final intraoperative TRISH revealed EF 60% with normally functioning TAVR with trace PVL  Cardiology has been consulted for post operative co-management  Home medication regimen includes simvastatin 10 mg daily  Patient resting in bed during consultation and denies any chest pain, shortness of breath, palpitations, lightheadedness or dizziness  His back is little sore from lying flat  He denies any significant groin pain  EKG reviewed personally: 7/21/2022- NSR at 65 bpm with nonspecfic lateral TWA      Telemetry reviewed personally:  Sinus rhythm        Review of Systems   Constitutional: Negative  Negative for chills  Cardiovascular: Negative for chest pain, dyspnea on exertion, leg swelling, near-syncope, orthopnea, palpitations, paroxysmal nocturnal dyspnea and syncope  Respiratory: Negative  Negative for cough, shortness of breath and wheezing  Endocrine: Negative  Hematologic/Lymphatic: Negative  Skin: Negative  Musculoskeletal: Positive for back pain  Gastrointestinal: Negative  Negative for diarrhea, nausea and vomiting  Neurological: Negative for dizziness, light-headedness and weakness  Psychiatric/Behavioral: Negative  Negative for altered mental status  All other systems reviewed and are negative  Historical Information   Past Medical History:   Diagnosis Date    Arthritis     Concussion     Elevated PSA     Hyperlipidemia     Murmur, heart 2021    Seizure ([address] )     TIA (transient ischemic attack)     Traumatic [facility] injury [facility])      Past Surgical History:   Procedure Laterality Date    CARDIAC CATHETERIZATION N/A 05/11/2022    Procedure: CARDIAC RHC/LHC; Surgeon: Joel Gonzalez MD;  Location: [facility];   Service: Cardiology    COLONOSCOPY       Social History     Substance and Sexual Activity   Alcohol Use Yes    Comment: once a day     Social History     Substance and Sexual Activity   Drug Use Not Currently     Social History     Tobacco Use   Smoking Status Never Smoker Smokeless Tobacco Never Used     Family History:   Family History   Problem Relation Age of Onset    No Known Problems Mother     Alzheimer's disease Father     Parkinsonism Sister     Rheum arthritis Sister        Meds/Allergies   all current active meds have been reviewed, current meds:   Current Facility-Administered Medications   Medication Dose Route Frequency    acetaminophen (TYLENOL) rectal suppository 650 mg  650 mg Rectal Q4H PRN    acetaminophen (TYLENOL) tablet 650 mg  650 mg Oral Q4H PRN    aspirin chewable tablet 81 mg  81 mg Oral Daily    bisacodyl (DULCOLAX) rectal suppository 10 mg  10 mg Rectal Daily PRN    ceFAZolin (ANCEF) IVPB (premix in dextrose) 2,000 mg 50 mL  2,000 mg Intravenous Q8H    chlorhexidine (PERIDEX) 0 12 % oral rinse 15 mL  15 mL Mouth/Throat BID    clopidogrel (PLAVIX) tablet 75 mg  75 mg Oral Daily    [START ON 7/22/2022] heparin (porcine) subcutaneous injection 5,000 Units  5,000 Units Subcutaneous Q8H [address]    hydroxychloroquine (PLAQUENIL) tablet 200 mg  200 mg Oral BID    insulin lispro (HumaLOG) 100 units/mL subcutaneous injection 1-5 Units  1-5 Units Subcutaneous HS    insulin lispro (HumaLOG) 100 units/mL subcutaneous injection 1-6 Units  1-6 Units Subcutaneous TID AC    loratadine (CLARITIN) tablet 10 mg  10 mg Oral Daily    multi-electrolyte (PLASMALYTE-A/ISOLYTE-S PH 7 4) IV solution  50 mL/hr Intravenous Continuous    mupirocin (BACTROBAN) 2 % nasal ointment 1 application  1 application Nasal T91M [address]    niCARdipine (CARDENE) 25 mg (STANDARD CONCENTRATION) in sodium chloride 0 9% 250 mL  1-15 mg/hr Intravenous Continuous    niCARdipine (CARDENE) 25 mg (STANDARD CONCENTRATION) in sodium chloride 0 9% 250 mL  1-15 mg/hr Intravenous Titrated    ondansetron (ZOFRAN) injection 4 mg  4 mg Intravenous Q6H PRN    OXcarbazepine (TRILEPTAL) tablet 150 mg  150 mg Oral BID    [START ON 7/22/2022] pantoprazole (PROTONIX) EC tablet 40 mg  40 mg Oral Early Morning    polyethylene glycol (MIRALAX) packet 17 g  17 g Oral Daily    pravastatin (PRAVACHOL) tablet 40 mg  40 mg Oral Daily With Dinner    and PTA meds:   Prior to Admission Medications   Prescriptions Last Dose Informant Patient Reported? Taking?    Ascorbic Acid (VITAMIN C) 100 MG tablet More than a month at Unknown time Self Yes No   Sig: Take by mouth   B Complex Vitamins (B COMPLEX 50) TABS 7/14/2022 Self Yes Yes   Sig: Take by mouth   Garlic 10 MG CAPS 3/52/0052 Self Yes Yes   Sig: Take by mouth   Glucosamine-Chondroit-Vit C-Mn (GLUCOSAMINE 1500 COMPLEX) CAPS 7/14/2022 Self Yes Yes   Sig: Take by mouth   Multiple Vitamin-Folic Acid TABS 1/22/9382 Self Yes Yes   Sig: Take by mouth   OXcarbazepine (TRILEPTAL) 150 mg tablet 7/20/2022 at 1700 Self Yes Yes   Sig: Take 150 mg by mouth 2 (two) times a day   Omega-3 Fatty Acids (FISH OIL) 645 MG CAPS 7/14/2022 Self Yes Yes   Sig: Take by mouth   aspirin-dipyridamole (AGGRENOX)  mg per 12 hr capsule 7/17/2022 Self Yes Yes   Sig: Take by mouth   diclofenac sodium (VOLTAREN) 1 % 7/14/2022 Self Yes Yes   Sig: Place on the skin   hydroxychloroquine (PLAQUENIL) 200 mg tablet 7/20/2022 at 1700 Self Yes Yes   Sig: Take 1 tablet by mouth 2 (two) times a day   loratadine (CLARITIN) 10 mg tablet 7/14/2022 Self Yes Yes   Sig: Take by mouth   mupirocin (BACTROBAN) 2 % nasal ointment 7/20/2022 at 2100  No Yes   Sig: into each nostril 2 (two) times a day   omeprazole (PriLOSEC) 20 mg delayed release capsule 0800 Self Yes Yes   Sig: Take 20 mg by mouth 2 (two) times a day   polyethylene glycol (GLYCOLAX) 17 GM/SCOOP powder 7/20/2022 at 0800 Self Yes Yes   Sig: TAKE 1 CAPFUL (17GM) BY MOUTH ONCE EVERY DAY AS NEEDED MIXED IN WATER OR JUICE   simvastatin (ZOCOR) 10 mg tablet 7/20/2022 at 1700 Self Yes Yes   Sig: Take 1 tablet by mouth daily   vitamin E, tocopherol, 400 units capsule 7/14/2022 Self Yes Yes   Sig: Take by mouth      Facility-Administered Medications: None multi-electrolyte, 50 mL/hr, Last Rate: 50 mL/hr (07/21/22 1110)  niCARdipine, 1-15 mg/hr, Last Rate: 5 mg/hr (07/21/22 1109)  niCARdipine, 1-15 mg/hr        No Known Allergies    Objective   Vitals: Blood pressure 121/65, pulse 62, temperature (!) 96 9 °F (36 1 °C), resp  rate 12, height 5' 11 5" (1 816 m), weight 95 3 kg (210 lb 3 2 oz), SpO2 94 %  ,     Body mass index is 28 91 kg/m²  ,     Systolic (63XLG), ESJ:437 , Min:115 , VDD:866     Diastolic (02VEC), XFC:38, Min:52, Max:75    Wt Readings from Last 3 Encounters:   07/21/22 95 3 kg (210 lb 3 2 oz)   07/13/22 96 6 kg (213 lb)   05/25/22 97 5 kg (215 lb)      Lab Results   Component Value Date    CREATININE 0 78 07/21/2022    CREATININE 0 90 07/14/2022    CREATININE 0 71 05/11/2022             Intake/Output Summary (Last 24 hours) at 7/21/2022 1501  Last data filed at 7/21/2022 1234  Gross per 24 hour   Intake 800 ml   Output 450 ml   Net 350 ml     Weight (last 2 days)     Date/Time Weight    07/21/22 0853 95 3 (210 2)        Invasive Devices  Report    Central Venous Catheter Line  Duration           CVC Central Lines 07/21/22 Triple <1 day          Peripheral Intravenous Line  Duration           Peripheral IV 07/21/22 Dorsal (posterior); Left Hand <1 day          Arterial Line  Duration           Arterial Line 07/21/22 Left Radial <1 day          Drain  Duration           Urethral Catheter Non-latex; Temperature probe 16 Fr  <1 day                  Physical Exam  Vitals and nursing note reviewed  Constitutional:       General: He is not in acute distress  Appearance: He is well-developed  Comments: Lying flat in bed on RA in NAD   HENT:      Head: Normocephalic and atraumatic  Neck:      Vascular: No JVD  Cardiovascular:      Rate and Rhythm: Normal rate and regular rhythm  Heart sounds: Murmur heard  No friction rub  No gallop        Comments: Soft systolic murmur heard at the RUSB  Pulmonary:      Effort: Pulmonary effort is normal  No respiratory distress  Breath sounds: Normal breath sounds  No wheezing or rales  Chest:      Chest wall: No tenderness  Abdominal:      General: Bowel sounds are normal  There is no distension  Palpations: Abdomen is soft  Tenderness: There is no abdominal tenderness  Musculoskeletal:         General: No tenderness  Normal range of motion  Cervical back: Normal range of motion and neck supple  Right lower leg: No edema  Left lower leg: No edema  Skin:     General: Skin is warm and dry  Coloration: Skin is not pale  Findings: No erythema  Neurological:      Mental Status: He is alert and oriented to person, place, and time  Psychiatric:         Mood and Affect: Mood normal          Behavior: Behavior normal          Thought Content:  Thought content normal          Judgment: Judgment normal            LABORATORY RESULTS:      CBC with diff:   Results from last 7 days   Lab Units 07/21/22  1122 07/21/22  1102 07/21/22  1045 07/21/22  1019   HEMOGLOBIN g/dL 14 4  --   --   --    I STAT HEMOGLOBIN g/dl  --  13 6 13 6 15 0   HEMATOCRIT % 43 1  --   --   --    HEMATOCRIT, ISTAT %  --  40 40 44   PLATELETS Thousands/uL 158  --   --   --    MPV fL 9 9  --   --   --        CMP:  Results from last 7 days   Lab Units 07/21/22  1122 07/21/22  1102 07/21/22  1045 07/21/22  1019   POTASSIUM mmol/L 3 8  --   --   --    CHLORIDE mmol/L 110*  --   --   --    CO2 mmol/L 26  --   --   --    CO2, I-STAT mmol/L  --  28 27 29   BUN mg/dL 19  --   --   --    CREATININE mg/dL 0 78  --   --   --    GLUCOSE, ISTAT mg/dl  --  105 111 107   CALCIUM mg/dL 8 7  --   --   --    EGFR ml/min/1 73sq m 88  --   --   --        BMP:  Results from last 7 days   Lab Units 07/21/22  1122 07/21/22  1102 07/21/22  1045 07/21/22  1019   POTASSIUM mmol/L 3 8  --   --   --    CHLORIDE mmol/L 110*  --   --   --    CO2 mmol/L 26  --   --   --    CO2, I-STAT mmol/L  --  28 27 29   BUN mg/dL 19  --   -- --    CREATININE mg/dL 0 78  --   --   --    GLUCOSE, ISTAT mg/dl  --  105 111 107   CALCIUM mg/dL 8 7  --   --   --           No results found for: NTBNP          Lipid Profile:   No results found for: CHOL  Lab Results   Component Value Date    HDL 59 10/14/2021    HDL 51 2021    HDL 67 2020     Lab Results   Component Value Date    LDLCALC 85 10/14/2021    LDLCALC 81 2021    LDLCALC 58 2020     Lab Results   Component Value Date    TRIG 123 10/14/2021    TRIG 142 2021    TRIG 73 2020         Cardiac testing:   Results for orders placed during the hospital encounter of 21    Echo complete with contrast if indicated    Narrative  [address] [facility] [address]  [phone]    Transthoracic Echocardiogram  2D, M-mode, Doppler, and Color Doppler    Study date:  22-Sep-2021    Patient: Mahin Miles  MR number: [id number]  Account number: [de-identified]  : [id number]  Age: 78 years  Gender: Male  Status: Outpatient  Location: Echo lab  Height: 72 in  Weight: 215 6 lb  BP: 128/ 70 mmHg    Indications: murmur    Diagnoses: 785 2 - CARDIAC MURMURS NEC    Sonographer:  Dianne Johnston RDCS  Primary Physician:  Baljit Meza DO  Referring Physician:  Jimmie Lamp:  [facility]  Interpreting Physician:  Henrik Mello MD    SUMMARY    PROCEDURE INFORMATION:  This was a technically difficult study  LEFT VENTRICLE:  Size was normal   Systolic function was normal  Ejection fraction was estimated to be 60 %  There were no regional wall motion abnormalities  Wall thickness was increased  There was mild concentric hypertrophy  MITRAL VALVE:  There was mild regurgitation  AORTIC VALVE:  The valve was probably trileaflet  Leaflets exhibited moderate calcification and markedly reduced cuspal separation  There was severe stenosis  Valve peak gradient was 63 mmHg    Valve mean gradient was 40 mmHg  Aortic valve area was 0 9 cmï¾² by the continuity equation  TRICUSPID VALVE:  There was mild regurgitation  Estimated peak PA pressure was 30 mmHg  HISTORY: PRIOR HISTORY: cerebral vascular accident, seizure disorder, dyslipidemia, traumatic brain injury    PROCEDURE: The procedure was performed in the echo lab  This was a routine study  The transthoracic approach was used  The study included complete 2D imaging, M-mode, complete spectral Doppler, and color Doppler  Images were obtained from  the parasternal, apical, subcostal, and suprasternal notch acoustic windows  This was a technically difficult study  LEFT VENTRICLE: Size was normal  Systolic function was normal  Ejection fraction was estimated to be 60 %  There were no regional wall motion abnormalities  Wall thickness was increased  There was mild concentric hypertrophy  RIGHT VENTRICLE: The size was normal  Systolic function was normal  Wall thickness was normal     LEFT ATRIUM: Size was normal     RIGHT ATRIUM: Size was normal     MITRAL VALVE: Valve structure was normal  There was normal leaflet separation  DOPPLER: The transmitral velocity was within the normal range  There was no evidence for stenosis  There was mild regurgitation  AORTIC VALVE: The valve was probably trileaflet  Leaflets exhibited moderate calcification and markedly reduced cuspal separation  DOPPLER: There was severe stenosis  There was no regurgitation  TRICUSPID VALVE: The valve structure was normal  There was normal leaflet separation  DOPPLER: The transtricuspid velocity was within the normal range  There was no evidence for stenosis  There was mild regurgitation  Estimated peak PA  pressure was 30 mmHg  PULMONIC VALVE: Leaflets exhibited normal thickness, no calcification, and normal cuspal separation  DOPPLER: The transpulmonic velocity was within the normal range  There was no regurgitation  PERICARDIUM: There was no pericardial effusion   The pericardium was normal in appearance  AORTA: The root exhibited normal size  MEASUREMENT TABLES    DOPPLER MEASUREMENTS  Aortic valve   (Reference normals)  Peak gradient   63 mmHg   (--)  Mean gradient   40 mmHg   (--)  Valve area, cont   0 9 cmï¾²   (--)    SYSTEM MEASUREMENT TABLES    2D  %FS: 45 89 %  Ao Diam: 3 12 cm  EDV(Teich): 89 62 ml  EF(Teich): 77 44 %  ESV(Teich): 20 22 ml  IVSd: 1 22 cm  LA Area: 15 6 cm2  LA Diam: 3 36 cm  LVEDV MOD A4C: 135 62 ml  LVEF MOD A4C: 54 28 %  LVESV MOD A4C: 62 ml  LVIDd: 4 44 cm  LVIDs: 2 4 cm  LVLd A4C: 9 52 cm  LVLs A4C: 8 02 cm  LVOT Diam: 2 1 cm  LVPWd: 1 17 cm  RA Area: 17 65 cm2  RVIDd: 4 06 cm  SV MOD A4C: 73 62 ml  SV(Teich): 69 4 ml    CW  AV Env  Ti: 299 71 ms  AV VTI: 92 96 cm  AV Vmax: 3 98 m/s  AV Vmean: 3 1 m/s  AV maxP 37 mmHg  AV meanP 79 mmHg  TR Vmax: 2 51 m/s  TR maxP 21 mmHg    MM  TAPSE: 3 25 cm    PW  ELMER (VTI): 0 9 cm2  ELMER Vmax: 0 94 cm2  LEMER Vmax, Pt: 0 93 cm2  AVAI (VTI): 0 cm2/m2  AVAI Vmax: 0 cm2/m2  AVAI Vmax, Pt: 0 cm2/m2  E' Av 07 m/s  E' Lat: 0 07 m/s  E' Sept: 0 07 m/s  E/E' Avg: 10 91  E/E' Lat: 11 05  E/E' Sept: 10 77  LVOT Env  Ti: 352 05 ms  LVOT VTI: 24 24 cm  LVOT Vmax: 1 07 m/s  LVOT Vmean: 0 69 m/s  LVOT maxP 61 mmHg  LVOT meanP 29 mmHg  LVSI Dopp: 38 14 ml/m2  LVSV Dopp: 83 91 ml  MV A [place]: 0 97 m/s  MV Dec [place]: 1 94 m/s2  MV DecT: 391 84 ms  MV E [place]: 0 76 m/s  MV E/A Ratio: 0 78  MV PHT: 113 63 ms  MVA By PHT: 1 94 cm2    [facility] Accredited Echocardiography Laboratory    Prepared and electronically signed by    Kalin Cruz MD  Signed 22-Sep-2021 16:12:25    No results found for this or any previous visit  No valid procedures specified  No results found for this or any previous visit  Imaging: I have personally reviewed pertinent reports      Cardiac catheterization    Result Date: 2022  Narrative: Surgeon: Cris Sauceda DO Co-surgeon: Kristine Aguilar MD FINDINGS:  1  Intraoperative transesophageal echocardiogram revealed severe aortic stenosis  2  Final transesophageal echocardiogram demonstrated prosthetic valve with normal function and trace paravalvular leak  Operative Technique  The patient was taken to the operating room and placed supine on the operating table  Following the satisfactory induction of general anesthesia and placement of monitoring lines, the patient was prepped and draped in the usual sterile fashion  A time-out procedure was performed  The left common femoral artery and left common femoral vein were accessed percutaneously by the modified Seldinger technique and fluoroscopy  Through the left common femoral vein sheath, a balloon-tipped temporary pacing catheter was inserted and advanced into the right ventricle  Capture was confirmed  Two Perclose sutures were deployed in the left femoral artery  A 7 Fr sheath was placed in the left common femoral artery  A pigtail catheter was inserted and advanced to the right coronary cusp  An aortogram was performed to determine proper angle and orientation for valve deployment  A [employer] extra-stiff wire was positioned in the ascending aorta over an exchange catheter, and the sheath for the delivery system was inserted through the left common femoral artery and advanced into the aorta  The patient was systemically heparinized  A 5 Fr JR4 coronary catheter was advanced through the delivery sheath to the aortic valve  The aortic valve was crossed with a 0 035 straight-tip wire, the JR4 catheter was advanced into the left ventricular cavity and was exchanged for a curved tip Amplatzer extra stiff wire  A 29 mm RADU 3 Ultra valve delivery system was advanced through the delivery sheath into the aorta, the delivery system was configured for deployment  The valve on the delivery system was then advanced and the aortic valve was crossed  The catheter was desheathed in the standard fashion   The valve was positioned using a combination of fluoroscopy and transesophageal echocardiogram guidance  During an episode of rapid pacing, balloon deployment of the valve was performed  Following deployment, the position of the valve was confirmed by fluoroscopy and echocardiography and its position appeared appropriate with trace paravalvular leak  The valve delivery system was removed, followed by removal of the sheath from the left common femoral artery  The Perclose sutures were secured and direct pressure was held  Protamine was administered with normalization of the ACT  Pressure was released, without evidence of active bleeding  The left common femoral vein sheath was removed and pressure was held  I was present and scrubbed for all critical portions of this procedure  Sponge, needle and instrument counts were reported as correct by the nursing staff  TRISH Anesthesia    Result Date: 7/21/2022  Narrative: Aminah Culp MD     7/21/2022 10:56 AM Procedure Performed: TRISH Anesthesia Start Time:  7/21/2022 10:17 AM Preanesthesia Checklist Patient identified, IV assessed, risks and benefits discussed, monitors and equipment assessed, procedure being performed at surgeon's request and anesthesia consent obtained  Procedure Diagnostic Indications for TRISH:  assessment of surgical repair and hemodynamic monitoring  Type of TRISH: interventional TRISH with real time guidance of intracardiac procedure  Images Saved: ultrasound permanent image saved  Physician Requesting Echo: Rolando Gray DO  Location performed: OR  Intubated  Bite block not placed  Heart visualized  Insertion of TRISH Probe:  Atraumatic  Probe Type:  Multiplane  Modalities:  3D, color flow mapping, continuous wave Doppler and pulse wave Doppler  Echocardiographic and Doppler Measurements PREPROCEDURE LEFT VENTRICLE: Systolic Function: normal  Ejection Fraction: 60%   Cavity size: normal    RIGHT VENTRICLE: Systolic Function: normal   Cavity size normal  No hypertrophy  AORTIC VALVE: Leaflets: trileaflet  Leaflet motions restricted  Stenosis: severe  Regurgitation: none  MITRAL VALVE: Leaflets: normal  Leaflet Motions: normal  Regurgitation: trace  Stenosis: none  TRICUSPID VALVE: Leaflets: normal  Leaflet Motions: normal  Stenosis: none  Regurgitation: trace  PULMONIC VALVE: Leaflets: normal  Regurgitation: none  Stenosis: none  ASCENDING AORTA: Size:  normal   Dissection not present  AORTIC ARCH: Size:  normal   dissection not present  Grade 2: severe intimal thickening without protruding atheroma  DESCENDING AORTA: Size: normal   Dissection not present  Grade 2: severe intimal thickening without protruding atheroma  RIGHT ATRIUM: Size:  normal  LEFT ATRIUM: Size: normal  LEFT ATRIAL APPENDAGE: Size: normal   ATRIAL SEPTUM: Intra-atrial septal morphology: normal   VENTRICULAR SEPTUM: Intra-ventricular septum morphology: normal  OTHER FINDINGS: Pericardium:  normal  Pleural Effusion:  none  POSTPROCEDURE LEFT VENTRICLE: Unchanged   RIGHT VENTRICLE: Unchanged   AORTIC VALVE: Leaflets: bioprosthetic  Stenosis: none  Mean Gradient: 2 mmHg  Regurgitation: trace  Valve Size: 29 mm  MITRAL VALVE: Unchanged   TRICUSPID VALVE: Unchanged   PULMONIC VALVE: Unchanged   ATRIA: Unchanged   AORTA: Unchanged   REMOVAL: Probe Removal: atraumatic  VAS carotid complete study    Result Date: 7/11/2022  Narrative:  [facility] REPORT CLINICAL: Indications: Patient presents for a general health evaluation secondary to future TAVR  Patient is asymptomatic at this time  Physician wants to rule out B/L ICA stenosis  Operative History: No prior heart or vascular surgery Risk Factors The patient has history of hyperlipidemia, aortic stenosis, rheumatoid arthritis, TBI, PE, and seizure disorder  He is a non-smoker  Height:  72 inches  Weight:  215 lbs  Clinical: Brachial BP: Right:  Pressure dressing  Left:  124/80 mm Hg    FINDINGS:  Right Impression  PSV  EDV (cm/s)  Direction of Flow  Ratio  Dist  ICA                 74          23                      1 06  Mid  ICA                  75          22                      1 07  Prox  ICA    1 - 49%      94          23                      1 35  Dist CCA                  88          18                            Mid CCA                   70          13                      0 75  Prox CCA                  93          11                            Ext Carotid               65           7                      0 93  Prox Vert                 45          17  Antegrade                 Subclavian               115           9                             Left         Impression  PSV  EDV (cm/s)  Direction of Flow  Ratio  Dist  ICA                 64          23                      0 94  Mid  ICA                  70          28                      1 02  Prox  ICA    1 - 49%      76          22                      1 11  Dist CCA                  69          13                            Mid CCA                   69          13                      0 62  Prox CCA                 111          13                            Ext Carotid               57           0                      0 83  Prox Vert                 37          11  Antegrade                 Subclavian                62           0                               CONCLUSION:  Impression  RIGHT: There is <50% stenosis noted in the tortuous internal carotid artery  Plaque is heterogenous and smooth  Vertebral artery flow is antegrade  There is no significant subclavian artery disease  LEFT: There is <50% stenosis noted in the tortuous internal carotid artery  Plaque is heterogenous and smooth  Vertebral artery flow is antegrade   There is no significant subclavian artery disease  Internal carotid artery stenosis determination by consensus criteria from: Vamshi Martinez et al  Carotid Artery Stenosis: Gray-Scale and Doppler US Diagnosis - [employer] in [address] [place] [address], Radiology 2003; [id number]  SIGNATURE: Electronically Signed by: Smithadanielle Archers on 2022-07-11 04:31:48 PM    XR chest portable ICU    Result Date: 7/21/2022  Narrative: CHEST INDICATION:   Post TAVR  COMPARISON:  Chest radiograph from 10/18/2021 and chest CT from 7/11/2022  EXAM PERFORMED/VIEWS:  AP PORTABLE  FINDINGS: Right jugular catheter in SVC  Cardiomediastinal silhouette appears unremarkable  Post TAVR  Lungs clear  No effusion or pneumothorax  Osseous structures within normal limits for age  Impression: No acute cardiopulmonary disease  Post TAVR  Workstation performed: [id number]     CTA chest abdomen pelvis w wo contrast TAVR    Result Date: 7/11/2022  Narrative: CT ANGIOGRAM OF THE CHEST, ABDOMEN AND PELVIS WITH AND WITHOUT IV CONTRAST INDICATION:  Preoperative evaluation for TAVR COMPARISON: CT chest 5/7/2010, CT abdomen pelvis 1/16/2009 TECHNIQUE:  CT angiogram examination of the chest, abdomen and pelvis was performed according to standard protocol with cardiac gating  Axial, sagittal, and coronal reformatted images were submitted for interpretation  3D reconstructions were performed an independent workstation, and are supplied for review  Radiation dose length product (DLP) for this visit:  3008 mGy-cm   This examination, like all CT scans performed in the [facility], was performed utilizing techniques to minimize radiation dose exposure, including the use of iterative reconstruction and automated exposure control  IV Contrast:  120 mL of iodixanol (VISIPAQUE)    Enteric Contrast: Enteric contrast was not administered   FINDINGS: VASCULAR STRUCTURES:     Annulus: diameter 29 2 x 26 9 mm     area: 618 1 sq mm   Annulus to LCA: 11 4 mm   Annulus to RCA: 13 9 mm   Minimal diameter right iliofemoral segment: 9 0 mm   Minimal diameter left iliofemoral segment: 8 7 mm The ascending aorta is nonaneurysmal measuring 38 mm with mild atherosclerosis  There is a type I aortic arch with classic branching anatomy and no stenosis in the visualized great vessels  The aortic arch is nonaneurysmal with mild atherosclerosis  The  descending thoracic aorta is nonaneurysmal with mild atherosclerosis  The abdominal aorta is nonaneurysmal with mild atherosclerosis  Celiac, superior mesenteric, and inferior mesenteric arteries are patent  Bilateral renal arteries are patent  Bilateral iliofemoral arteries are nonaneurysmal with mild atherosclerosis  Cardiac findings: There is moderate calcification of the aortic valve  The left ventricle is normal   The ventricular septum is normal   No prior valvular surgery  No prior bypass surgery  No pericardial effusion  No cardiac mass or thrombus  Coronary artery calcifications  OTHER FINDINGS: CHEST LUNGS:  Lungs are clear  Small filling defects in the trachea likely represents mucus  There is no endobronchial lesion  PLEURA:  Unremarkable  MEDIASTINUM AND ESTEFANI:  Unremarkable  CHEST WALL AND LOWER NECK:   Unremarkable  ABDOMEN LIVER/BILIARY TREE:  Unremarkable  GALLBLADDER:  No calcified gallstones  No pericholecystic inflammatory change  SPLEEN:  Unremarkable  PANCREAS:  Unremarkable  ADRENAL GLANDS:  Unremarkable  KIDNEYS/URETERS:  Cyst at the superior pole left kidney  No hydronephrosis  STOMACH AND BOWEL:  Unremarkable  APPENDIX:  No findings to suggest appendicitis  ABDOMINOPELVIC CAVITY:  No ascites or free intraperitoneal air  No lymphadenopathy  PELVIS REPRODUCTIVE ORGANS:  Unremarkable for patient's age  URINARY BLADDER:  Unremarkable  ABDOMINAL WALL/INGUINAL REGIONS:  Small fat-containing umbilical hernia  OSSEOUS STRUCTURES:  No acute fracture or destructive osseous lesion  Grade 1 anterolisthesis of L4 on L5  Bilateral L4 pars defect  Sacralization of L5  Impression: TAVR measurements as above   Workstation performed: [id number]           Counseling / Coordination of Care  Total floor / unit time spent today 45 minutes  Greater than 50% of total time was spent with the patient and / or family counseling and / or coordination of care  A description of the counseling / coordination of care: Review of history, current assessment, development of a plan  Code Status: Level 1 - Full Code    ** Please Note: Dragon 360 Dictation voice to text software may have been used in the creation of this document   **

## 2022-07-22 ENCOUNTER — APPOINTMENT (INPATIENT)
Dept: RADIOLOGY | Facility: HOSPITAL | Age: 77
DRG: 267 | End: 2022-07-22
Payer: MEDICARE

## 2022-07-22 VITALS
HEIGHT: 72 IN | BODY MASS INDEX: 28.43 KG/M2 | TEMPERATURE: 97.7 F | SYSTOLIC BLOOD PRESSURE: 135 MMHG | RESPIRATION RATE: 18 BRPM | WEIGHT: 209.88 LBS | HEART RATE: 58 BPM | OXYGEN SATURATION: 95 % | DIASTOLIC BLOOD PRESSURE: 74 MMHG

## 2022-07-22 LAB
ABO GROUP BLD BPU: NORMAL
ANION GAP SERPL CALCULATED.3IONS-SCNC: 6 MMOL/L (ref 4–13)
AORTIC ROOT: 2.9 CM
AORTIC VALVE MEAN VELOCITY: 14.7 M/S
AORTIC VALVE MEAN VELOCITY: 5.6 M/S
APICAL FOUR CHAMBER EJECTION FRACTION: 64 %
ATRIAL RATE: 53 BPM
ATRIAL RATE: 57 BPM
ATRIAL RATE: 57 BPM
ATRIAL RATE: 60 BPM
AV AREA BY CONTINUOUS VTI: 1.8 CM2
AV AREA BY CONTINUOUS VTI: 2.7 CM2
AV AREA PEAK VELOCITY: 1.6 CM2
AV AREA PEAK VELOCITY: 2.8 CM2
AV LVOT MEAN GRADIENT: 1 MMHG
AV LVOT MEAN GRADIENT: 4 MMHG
AV LVOT PEAK GRADIENT: 3 MMHG
AV LVOT PEAK GRADIENT: 6 MMHG
AV MEAN GRADIENT: 10 MMHG
AV MEAN GRADIENT: 2 MMHG
AV PEAK GRADIENT: 20 MMHG
AV PEAK GRADIENT: 3 MMHG
AV VALVE AREA: 1.82 CM2
AV VELOCITY RATIO: 0.57
BPU ID: NORMAL
BUN SERPL-MCNC: 19 MG/DL (ref 5–25)
CALCIUM SERPL-MCNC: 9 MG/DL (ref 8.3–10.1)
CHLORIDE SERPL-SCNC: 111 MMOL/L (ref 96–108)
CO2 SERPL-SCNC: 25 MMOL/L (ref 21–32)
CREAT SERPL-MCNC: 0.88 MG/DL (ref 0.6–1.3)
CROSSMATCH: NORMAL
DOP CALC AO PEAK VEL: 2.21 M/S
DOP CALC AO VTI: 19.2 CM
DOP CALC AO VTI: 42.48 CM
DOP CALC LVOT AREA: 2.83 CM2
DOP CALC LVOT AREA: 3.1 CM2
DOP CALC LVOT DIAMETER: 1.9 CM
DOP CALC LVOT DIAMETER: 2 CM
DOP CALC LVOT PEAK VEL VTI: 16.8 CM
DOP CALC LVOT PEAK VEL VTI: 27.29 CM
DOP CALC LVOT PEAK VEL: 0.82 M/S
DOP CALC LVOT PEAK VEL: 1.25 M/S
DOP CALC LVOT STROKE INDEX: 24.5 ML/M2
DOP CALC LVOT STROKE INDEX: 36.1 ML/M2
DOP CALC LVOT STROKE VOLUME: 53 CM3
DOP CALC LVOT STROKE VOLUME: 77.34 CM3
E WAVE DECELERATION TIME: 311 MS
ERYTHROCYTE [DISTWIDTH] IN BLOOD BY AUTOMATED COUNT: 12.7 % (ref 11.6–15.1)
FRACTIONAL SHORTENING: 41 % (ref 28–44)
GFR SERPL CREATININE-BSD FRML MDRD: 84 ML/MIN/1.73SQ M
GLUCOSE SERPL-MCNC: 114 MG/DL (ref 65–140)
GLUCOSE SERPL-MCNC: 135 MG/DL (ref 65–140)
GLUCOSE SERPL-MCNC: 92 MG/DL (ref 65–140)
HCT VFR BLD AUTO: 42.3 % (ref 36.5–49.3)
HGB BLD-MCNC: 14.1 G/DL (ref 12–17)
INTERVENTRICULAR SEPTUM IN DIASTOLE (PARASTERNAL SHORT AXIS VIEW): 1.5 CM
INTERVENTRICULAR SEPTUM: 1.5 CM (ref 0.6–1.1)
LAAS-AP4: 17.5 CM2
LEFT ATRIUM SIZE: 3.5 CM
LEFT INTERNAL DIMENSION IN SYSTOLE: 2.4 CM (ref 2.1–4)
LEFT VENTRICULAR INTERNAL DIMENSION IN DIASTOLE: 4.1 CM (ref 3.5–6)
LEFT VENTRICULAR POSTERIOR WALL IN END DIASTOLE: 1.3 CM
LEFT VENTRICULAR STROKE VOLUME: 52 ML
LVSV (TEICH): 52 ML
MAGNESIUM SERPL-MCNC: 2.5 MG/DL (ref 1.6–2.6)
MCH RBC QN AUTO: 34 PG (ref 26.8–34.3)
MCHC RBC AUTO-ENTMCNC: 33.3 G/DL (ref 31.4–37.4)
MCV RBC AUTO: 102 FL (ref 82–98)
MV E'TISSUE VEL-SEP: 7 CM/S
MV PEAK A VEL: 1.03 M/S
MV PEAK E VEL: 67 CM/S
MV STENOSIS PRESSURE HALF TIME: 90 MS
MV VALVE AREA P 1/2 METHOD: 2.44 CM2
P AXIS: 23 DEGREES
P AXIS: 23 DEGREES
P AXIS: 26 DEGREES
P AXIS: 31 DEGREES
PLATELET # BLD AUTO: 154 THOUSANDS/UL (ref 149–390)
PMV BLD AUTO: 10.3 FL (ref 8.9–12.7)
POTASSIUM SERPL-SCNC: 4 MMOL/L (ref 3.5–5.3)
PR INTERVAL: 174 MS
PR INTERVAL: 176 MS
PR INTERVAL: 180 MS
PR INTERVAL: 184 MS
QRS AXIS: 20 DEGREES
QRS AXIS: 20 DEGREES
QRS AXIS: 23 DEGREES
QRS AXIS: 26 DEGREES
QRSD INTERVAL: 84 MS
QRSD INTERVAL: 84 MS
QRSD INTERVAL: 86 MS
QRSD INTERVAL: 86 MS
QT INTERVAL: 454 MS
QT INTERVAL: 460 MS
QT INTERVAL: 462 MS
QT INTERVAL: 468 MS
QTC INTERVAL: 439 MS
QTC INTERVAL: 441 MS
QTC INTERVAL: 447 MS
QTC INTERVAL: 462 MS
RBC # BLD AUTO: 4.15 MILLION/UL (ref 3.88–5.62)
RIGHT ATRIAL 2D VOLUME: 54 ML
RIGHT ATRIUM AREA SYSTOLE A4C: 19 CM2
RIGHT VENTRICLE ID DIMENSION: 3.9 CM
SL CV LV EF: 70
SL CV PED ECHO LEFT VENTRICLE DIASTOLIC VOLUME (MOD BIPLANE) 2D: 72 ML
SL CV PED ECHO LEFT VENTRICLE SYSTOLIC VOLUME (MOD BIPLANE) 2D: 20 ML
SODIUM SERPL-SCNC: 142 MMOL/L (ref 135–147)
T WAVE AXIS: -1 DEGREES
T WAVE AXIS: -11 DEGREES
T WAVE AXIS: 1 DEGREES
T WAVE AXIS: 3 DEGREES
UNIT DISPENSE STATUS: NORMAL
UNIT PRODUCT CODE: NORMAL
UNIT PRODUCT VOLUME: 350 ML
UNIT RH: NORMAL
VENTRICULAR RATE: 53 BPM
VENTRICULAR RATE: 57 BPM
VENTRICULAR RATE: 57 BPM
VENTRICULAR RATE: 60 BPM
WBC # BLD AUTO: 8.32 THOUSAND/UL (ref 4.31–10.16)

## 2022-07-22 PROCEDURE — 99238 HOSP IP/OBS DSCHRG MGMT 30/<: CPT | Performed by: THORACIC SURGERY (CARDIOTHORACIC VASCULAR SURGERY)

## 2022-07-22 PROCEDURE — NC001 PR NO CHARGE: Performed by: PHYSICIAN ASSISTANT

## 2022-07-22 PROCEDURE — 85027 COMPLETE CBC AUTOMATED: CPT | Performed by: THORACIC SURGERY (CARDIOTHORACIC VASCULAR SURGERY)

## 2022-07-22 PROCEDURE — 99222 1ST HOSP IP/OBS MODERATE 55: CPT | Performed by: NURSE PRACTITIONER

## 2022-07-22 PROCEDURE — 71045 X-RAY EXAM CHEST 1 VIEW: CPT

## 2022-07-22 PROCEDURE — 97166 OT EVAL MOD COMPLEX 45 MIN: CPT

## 2022-07-22 PROCEDURE — 83735 ASSAY OF MAGNESIUM: CPT | Performed by: THORACIC SURGERY (CARDIOTHORACIC VASCULAR SURGERY)

## 2022-07-22 PROCEDURE — 82948 REAGENT STRIP/BLOOD GLUCOSE: CPT

## 2022-07-22 PROCEDURE — 93306 TTE W/DOPPLER COMPLETE: CPT | Performed by: INTERNAL MEDICINE

## 2022-07-22 PROCEDURE — 80048 BASIC METABOLIC PNL TOTAL CA: CPT | Performed by: THORACIC SURGERY (CARDIOTHORACIC VASCULAR SURGERY)

## 2022-07-22 PROCEDURE — 97162 PT EVAL MOD COMPLEX 30 MIN: CPT

## 2022-07-22 PROCEDURE — 93005 ELECTROCARDIOGRAM TRACING: CPT

## 2022-07-22 PROCEDURE — 99232 SBSQ HOSP IP/OBS MODERATE 35: CPT | Performed by: INTERNAL MEDICINE

## 2022-07-22 PROCEDURE — 93010 ELECTROCARDIOGRAM REPORT: CPT | Performed by: INTERNAL MEDICINE

## 2022-07-22 RX ORDER — ACETAMINOPHEN 325 MG/1
650 TABLET ORAL EVERY 4 HOURS PRN
Qty: 100 TABLET | Refills: 0 | Status: SHIPPED | OUTPATIENT
Start: 2022-07-22

## 2022-07-22 RX ORDER — ASPIRIN 81 MG/1
81 TABLET, CHEWABLE ORAL DAILY
Qty: 90 TABLET | Refills: 2 | Status: SHIPPED | OUTPATIENT
Start: 2022-07-22 | End: 2022-10-20

## 2022-07-22 RX ORDER — CLOPIDOGREL BISULFATE 75 MG/1
75 TABLET ORAL DAILY
Qty: 90 TABLET | Refills: 0 | Status: SHIPPED | OUTPATIENT
Start: 2022-07-22 | End: 2022-10-20

## 2022-07-22 RX ADMIN — LORATADINE 10 MG: 10 TABLET ORAL at 09:24

## 2022-07-22 RX ADMIN — PANTOPRAZOLE SODIUM 40 MG: 40 TABLET, DELAYED RELEASE ORAL at 05:57

## 2022-07-22 RX ADMIN — MUPIROCIN 1 APPLICATION: 20 OINTMENT TOPICAL at 09:24

## 2022-07-22 RX ADMIN — CLOPIDOGREL BISULFATE 75 MG: 75 TABLET ORAL at 09:24

## 2022-07-22 RX ADMIN — CEFAZOLIN SODIUM 2000 MG: 2 SOLUTION INTRAVENOUS at 02:05

## 2022-07-22 RX ADMIN — HYDROXYCHLOROQUINE SULFATE 200 MG: 200 TABLET ORAL at 09:26

## 2022-07-22 RX ADMIN — CEFAZOLIN SODIUM 2000 MG: 2 SOLUTION INTRAVENOUS at 09:25

## 2022-07-22 RX ADMIN — OXCARBAZEPINE 150 MG: 150 TABLET, FILM COATED ORAL at 09:26

## 2022-07-22 RX ADMIN — CHLORHEXIDINE GLUCONATE 15 ML: 1.2 SOLUTION ORAL at 09:25

## 2022-07-22 RX ADMIN — HEPARIN SODIUM 5000 UNITS: 5000 INJECTION INTRAVENOUS; SUBCUTANEOUS at 05:57

## 2022-07-22 RX ADMIN — ASPIRIN 81 MG CHEWABLE TABLET 81 MG: 81 TABLET CHEWABLE at 09:24
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
## 2022-07-22 NOTE — RESTORATIVE TECHNICIAN NOTE
Restorative Technician Note      Patient Name: Xenia Ramirez     Restorative Tech Visit Date: 7/22/2022  Note Type: Mobility  Patient Position Upon Consult: Supine  Activity Performed: Ambulated  Assistive Device: Other (Comment) (none)  Patient Position at End of Consult: All needs within reach;  Bedside chair

## 2022-07-22 NOTE — DISCHARGE INSTRUCTIONS
Transcatheter Aortic Valve Replacement   AMBULATORY CARE:   What you need to know about a TAVR:   A TAVR is a procedure to replace your aortic valve  It is done without removing your old valve  The aortic valve is between the left ventricle and the aorta  The left ventricle is the lower left chamber of your heart  The aorta is a blood vessel that pumps blood to your brain and body  The aortic valve opens and closes to let blood flow from your heart to the rest of your body  TAVR may be used to replace your aortic valve if open heart surgery is not safe for you  Your valve will be replaced with a tissue valve  The tissue may be taken from an animal, such as a pig or cow  What will happen before a TAVR:   You may need an echocardiogram, angiogram, EKG, or CT scan before your procedure  These tests will help your healthcare provider plan your procedure  They will also make sure a TAVR is safe for you  You may need to stop taking blood thinner medicine several days before your procedure  This will prevent heavy bleeding during your procedure  Your healthcare provider will talk to you about how to prepare for your procedure  He may tell you not to eat or drink anything after midnight on the day of your procedure  He will tell you what medicines to take or not take on the day of your procedure  You may stay in the hospital 2 to 5 days after your procedure  Arrange for someone to drive you home and stay with you  This person can call 911 if you have problems at home  What will happen during a TAVR:   You may be given general anesthesia to keep you asleep and free from pain during your procedure  You may instead be given IV sedation and local anesthesia  IV sedation will make you feel calm and relaxed during the procedure  With local anesthesia, you may still feel pressure or pushing during your procedure, but you should not feel any pain   You may be given an antibiotic through your IV to prevent a bacterial infection  Tell healthcare providers if you have ever had an allergic reaction to an antibiotic  Your healthcare provider will make an incision in your neck, groin, or chest  He will guide a catheter with a balloon on the end through a blood vessel and into your heart  He will inflate the balloon in the opening of your valve  This balloon make space for your new valve to fit inside the old one  The balloon will be deflated and the catheter will be removed  Your healthcare provider will insert another catheter into your heart  This catheter will hold a balloon, a stent, and the new valve  The new valve will be inside of the stent  When the catheter reaches your valve, your healthcare provider will expand the balloon  The balloon will push your old valve against the walls of your heart  The stent and new valve will be put in the old valve's position  The balloon will be deflated  The stent will continue to hold your old valve out of the way  It will also keep your new valve in the correct place  Your healthcare provider will remove the catheter and wire  He may use clamps, stitches, or other devices to close the incision  Pressure will be applied to the incision for several minutes to stop any bleeding  A pressure bandage or other pressure device may be placed over the incision to help prevent more bleeding  What will happen after a TAVR:   Healthcare providers will monitor your vital signs and pulses in your arm or leg, closely  They will check your pressure bandage for bleeding or swelling  You will need to lie flat with your leg or arm straight for 2 to 4 hours  Do not  get out of bed until healthcare providers says it is okay  Arm or leg movements can cause serious bleeding  You may need blood tests, a chest x-ray, an EKG, or an echocardiogram before you leave the hospital  These tests will make sure your valve is working correctly   You will need to take blood thinner medicine for at least 6 months after your procedure  You may need to take aspirin for the rest of your life  These medicines will prevent blood clots from forming near your valve  Risks of a TAVR:  You may have bruising or pain where the catheter was  You may get an infection or bleed more than expected  The catheter may cause a hole in your heart or blood vessels  A blood clot may form around your valve  The clot may travel to your brain and cause a stroke  Your heartbeat may become irregular during or after a TAVR  You may need medicines or procedures to treat this  Your new valve may not work correctly  You may need another procedure to replace the valve  Follow up with your doctor as directed:  Write down your questions so you remember to ask them during your visits  © Copyright [employer] 2022 Information is for End User's use only and may not be sold, redistributed or otherwise used for commercial purposes  All illustrations and images included in CareNotes® are the copyrighted property of A D A M [employer] Inc  or [facility]  The above information is an  only  It is not intended as medical advice for individual conditions or treatments  Talk to your doctor, nurse or pharmacist before following any medical regimen to see if it is safe and effective for you  Heart Healthy Diet   WHAT YOU NEED TO KNOW:   A heart healthy diet is an eating plan low in unhealthy fats and sodium (salt)  The plan is high in healthy fats and fiber  A heart healthy diet helps improve your cholesterol levels and lowers your risk for heart disease and stroke  A dietitian will teach you how to read and understand food labels  DISCHARGE INSTRUCTIONS:   Heart healthy diet guidelines to follow:   Choose foods that contain healthy fats  Unsaturated fats  include monounsaturated and polyunsaturated fats  Unsaturated fat is found in foods such as soybean, canola, olive, corn, and safflower oils   It is also found in soft tub margarine that is made with liquid vegetable oil  Omega-3 fat  is found in certain fish, such as salmon, tuna, and trout, and in walnuts and flaxseed  Eat fish high in omega-3 fats at least 2 times a week  Get 20 to 30 grams of fiber each day  Fruits, vegetables, whole-grain foods, and legumes (cooked beans) are good sources of fiber  Limit or do not have unhealthy fats  Cholesterol  is found in animal foods, such as eggs and lobster, and in dairy products made from whole milk  Limit cholesterol to less than 200 mg each day  Saturated fat  is found in meats, such as domingo and hamburger  It is also found in chicken or turkey skin, whole milk, and butter  Limit saturated fat to less than 7% of your total daily calories  Trans fat  is found in packaged foods, such as potato chips and cookies  It is also in hard margarine, some fried foods, and shortening  Do not eat foods that contain trans fats  Limit sodium as directed  You may be told to limit sodium to 2,000 to 2,300 mg each day  Choose low-sodium or no-salt-added foods  Add little or no salt to food you prepare  Use herbs and spices in place of salt         Include the following in your heart healthy plan:  Ask your dietitian or healthcare provider how many servings to have from each of the following food groups:  Grains:      Whole-wheat breads, cereals, and pastas, and brown rice    Low-fat, low-sodium crackers and chips    Vegetables:      Broccoli, green beans, green peas, and spinach    Collards, kale, and lima beans    Carrots, sweet potatoes, tomatoes, and peppers    Canned vegetables with no salt added    Fruits:      Bananas, peaches, pears, and pineapple    Grapes, raisins, and dates    Oranges, tangerines, grapefruit, orange juice, and grapefruit juice    Apricots, mangoes, melons, and papaya    Raspberries and strawberries    Canned fruit with no added sugar    Low-fat dairy:      Nonfat (skim) milk, 1% milk, and low-fat almond, cashew, or soy milks fortified with calcium    Low-fat cheese, regular or frozen yogurt, and cottage cheese    Meats and proteins:      Lean cuts of beef and pork (loin, leg, round), skinless chicken and turkey    Legumes, soy products, egg whites, or nuts    Limit or do not include the following in your heart healthy plan:   Unhealthy fats and oils:      Whole or 2% milk, cream cheese, sour cream, or cheese    High-fat cuts of beef (T-bone steaks, ribs), chicken or turkey with skin, and organ meats such as liver    Butter, stick margarine, shortening, and cooking oils such as coconut or palm oil    Foods and liquids high in sodium:      Packaged foods, such as frozen dinners, cookies, macaroni and cheese, and cereals with more than 300 mg of sodium per serving    Vegetables with added sodium, such as instant potatoes, vegetables with added sauces, or regular canned vegetables    Cured or smoked meats, such as hot dogs, domingo, and sausage    High-sodium ketchup, barbecue sauce, salad dressing, pickles, olives, soy sauce, or miso    Foods and liquids high in sugar:      Candy, cake, cookies, pies, or doughnuts    Soft drinks (soda), sports drinks, or sweetened tea    Canned or dry mixes for cakes, soups, sauces, or gravies    Other healthy heart guidelines:   Do not smoke  Nicotine and other chemicals in cigarettes and cigars can cause lung and heart damage  Ask your healthcare provider for information if you currently smoke and need help to quit  E-cigarettes or smokeless tobacco still contain nicotine  Talk to your healthcare provider before you use these products  Limit or do not drink alcohol as directed  Alcohol can damage your heart and raise your blood pressure  Your healthcare provider may give you specific daily and weekly limits  The general recommended limit is 1 drink a day for women 21 or older and for men 72 or older  Do not have more than 3 drinks in a day or 7 in a week   The recommended limit is 2 drinks a day for men 24to 59years of age  Do not have more than 4 drinks in a day or 14 in a week  A drink of alcohol is 12 ounces of beer, 5 ounces of wine, or 1½ ounces of liquor  Exercise regularly  Exercise can help you maintain a healthy weight and improve your blood pressure and cholesterol levels  Regular exercise can also decrease your risk for heart problems  Ask your healthcare provider about the best exercise plan for you  Do not start an exercise program without asking your healthcare provider  Follow up with your doctor or cardiologist as directed:  Write down your questions so you remember to ask them during your visits  © Copyright [employer] 2022 Information is for End User's use only and may not be sold, redistributed or otherwise used for commercial purposes  All illustrations and images included in CareNotes® are the copyrighted property of [employer]  or Judy Mendez   The above information is an  only  It is not intended as medical advice for individual conditions or treatments  Talk to your doctor, nurse or pharmacist before following any medical regimen to see if it is safe and effective for you

## 2022-07-22 NOTE — PROGRESS NOTES
Progress Note - Cardiothoracic Surgery   Gladys Duron 78 y o  male MRN: [id number]  Unit/Bed#: [facility] 401-01 Encounter: [id number]    Aortic stenosis, Non-Rheumatic  S/P transfemoral transcatheter aortic valve replacement; POD # 1    24 Hour Events: No events (chronic SB) or complaints, denies CP or SOB, using IS  Weaned off cardene and NC  Ambulating well   Tolerating diet, urinating well since kenny removal      Medications:   Scheduled Meds:  Current Facility-Administered Medications   Medication Dose Route Frequency Provider Last Rate    acetaminophen  650 mg Rectal Q4H PRN Raffaele Parmar PA-C      acetaminophen  650 mg Oral Q4H PRN Scheryl Hunter Loomis PA-C      aspirin  81 mg Oral Daily [facility] Hunter [facility] [place]      bisacodyl  10 mg Rectal Daily PRN Scheryl Hunter Loomis PA-C      cefazolin  2,000 mg Intravenous [address] GABRIEL Loomis 2,000 mg (07/22/22 0205)    chlorhexidine  15 mL Mouth/Throat BID Scheryl Hunter Loomis PA-C      clopidogrel  75 mg Oral Daily [facility] [place]      glycerin-hypromellose-  2 drop Both Eyes Q6H PRN Dale Rouse PA-C      heparin (porcine)  5,000 Units Subcutaneous [facility] [place]      hydroxychloroquine  200 mg Oral BID [facility] [place], [place]      insulin lispro  1-5 Units Subcutaneous HS [place]      insulin lispro  1-6 Units Subcutaneous TID [facility] [place]      loratadine  10 mg Oral Daily [facility] [place]      mupirocin  1 application Nasal [id number] [place], [place]      niCARdipine  1-15 mg/hr Intravenous Continuous Tim Ventura MD Stopped (07/21/22 2015)    niCARdipine  1-15 mg/hr Intravenous Titrated Scheryl Hunter Loomis PA-C      ondansetron  4 mg Intravenous Q6H PRN Scheryl Hunter Herr PA-C      OXcarbazepine  150 mg Oral BID Beth Loomis PA-C      pantoprazole  40 mg Oral Early Morning Jagjit Kim      polyethylene glycol  17 g Oral Daily Army Craw [place], [place]      pravastatin  40 mg Oral Daily With NII Campbell PA-C       Continuous Infusions:niCARdipine, 1-15 mg/hr, Last Rate: Stopped (07/21/22 2015)  niCARdipine, 1-15 mg/hr      PRN Meds:   acetaminophen    acetaminophen    bisacodyl    glycerin-hypromellose-    ondansetron    Vitals:   Vitals:    07/22/22 0300 07/22/22 0400 07/22/22 0500 07/22/22 0600   BP: 101/53 109/53 110/55 129/70   BP Location:  Right arm     Pulse: (!) 54 (!) 50 (!) 54 60   Resp: 18 12 13 15   Temp:       TempSrc:       SpO2: 91% 93% 95% 95%   Weight:       Height:       chronic SB, baseline in 50s    Telemetry: NSR; Heart Rate: 60    Hemodynamics:       Respiratory:   SpO2: SpO2: 95 %; Room Air    Intake/Output:     Intake/Output Summary (Last 24 hours) at 7/22/2022 0635  Last data filed at 7/22/2022 0446  Gross per 24 hour   Intake 2018 75 ml   Output 2750 ml   Net -731 25 ml        Weights:   Weight (last 2 days)     Date/Time Weight    07/21/22 1600 95 3 (210)    07/21/22 0853 95 3 (210 2)          Results:   Results from last 7 days   Lab Units 07/22/22  0431 07/21/22  1122 07/21/22  1102   WBC Thousand/uL 8 32  --   --    HEMOGLOBIN g/dL 14 1 14 4  --    I STAT HEMOGLOBIN g/dl  --   --  13 6   HEMATOCRIT % 42 3 43 1  --    HEMATOCRIT, ISTAT %  --   --  40   PLATELETS Thousands/uL 154 158  --      Results from last 7 days   Lab Units 07/22/22  0431 07/21/22  1122 07/21/22  1102   POTASSIUM mmol/L 4 0 3 8  --    CHLORIDE mmol/L 111* 110*  --    CO2 mmol/L 25 26  --    CO2, I-STAT mmol/L  --   --  28   BUN mg/dL 19 19  --    CREATININE mg/dL 0 88 0 78  --    GLUCOSE, ISTAT mg/dl  --   --  105   CALCIUM mg/dL 9 0 8 7  --          Point of care glucose: 113 - 149 (no hx of DM)    Studies:    CXR: 7/22 No ptx/effusion  Lines in correct place   LLL atalectasis, read pending        EKG: SB, rate of 53    Echo: 7/21 Read pending    I have personally reviewed pertinent films in PACS    Invasive Lines/Tubes:  Invasive Devices  Report    Central Venous Catheter Line  Duration           CVC Central Lines 07/21/22 Triple <1 day          Peripheral Intravenous Line  Duration           Peripheral IV 07/21/22 Dorsal (posterior); Left Hand 1 day                Physical Exam:    HEENT/NECK:  Normocephalic  Atraumatic  No9 jugular venous distention  Cardiac: Regular rate and rhythm and No murmurs/rubs/gallops  Pulmonary:  Breath sounds clear bilaterally and No rales/rhonchi/wheezes  Abdomen:  Non-tender, Non-distended and Normal bowel sounds  Incisions: Groin puncture sites dressed with sterile dressing  No hematoma, erythema, or drainage  Extremities: Extremities warm/dry, DP pulses 2+ bilaterally and No edema B/L  Neuro: Alert and oriented X 3 and No focal deficits  Skin: Warm/Dry, without rashes or lesions  Assessment:  Principal Problem:    S/P TAVR (transcatheter aortic valve replacement)  Active Problems:    Dyslipidemia    Seizure disorder ([facility] )    Medicare annual wellness visit, subsequent    Rheumatoid arthritis of multiple sites with negative rheumatoid factor ([facility] )    Aortic valve stenosis    Memory loss       Aortic stenosis, Non-Rheumatic  S/P transfemoral transcatheter aortic valve replacement; POD # 1    Plan:    1  Cardiac:   Chronic Sinus Bradycardia; HR/BP well-controlled  No BB w/ SB  Central IV access no longer required; Remove central venous catheter today  ASA/Plavix  Cardiology for postoperative medical management  Follow up transthoracic echocardiogram today  Continue Statin therapy  Continue DVT prophylaxis    2  Pulmonary:   Extubated  CXR findings: as above  Good Room air oxygen saturation; Continue incentive spirometry/Coughing/Deep breathing exercises    3  Renal:   Intake/Output net: -731 mL/24 hours  Post op Creatinine stable; Follow up labs prn    4  Neuro:  Neurologically intact;  No active issues  Incisional pain well-controlled; Continue prn Tylenol  Hx of seizures- continue home oxcarbazepine    RA- continue home plaquenil    5  GI:  Tolerating TLC 2 3 gm sodium diet  Maintain 1800 mL daily fluid restriction   Continue daily stool softeners and prn suppository  Continue GI prophylaxis    6  Endo:   Glucose well-controlled  Continue Insulin sliding scale coverage    7  Hematology: WNL    8     Disposition:   Gerontology consultation ordered for routine assessment of TAVR patient over 72years old  Transfer from step down to telemetry level of care today, discharge today pending final echo read    VTE Pharmacologic Prophylaxis: Heparin  VTE Mechanical Prophylaxis: sequential compression device    Bedside rounds completed with supervising physician  Plan of care discussed with bedside nurse    SIGNATURE: Marga Bedoya PA-C  DATE: July 22, 2022  TIME: 6:35 AM
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
## 2022-07-22 NOTE — OCCUPATIONAL THERAPY NOTE
Occupational Therapy Evaluation     Patient Name: Elvia Degroot  UCFRM'R Date: 7/22/2022  Problem List  Principal Problem:    S/P TAVR (transcatheter aortic valve replacement)  Active Problems:    Dyslipidemia    [facility] )    Medicare annual wellness visit, subsequent    Rheumatoid arthritis of multiple sites with negative rheumatoid factor ([facility] )    Aortic valve stenosis    Memory loss    Past Medical History  Past Medical History:   Diagnosis Date    Arthritis     Concussion     Elevated PSA     Hyperlipidemia     Murmur, heart 2021    Se[facility] ([facility] )     TIA (transient ischemic attack)     Tra[facility] injury [facility])      Past Surgical History  Past Surgical History:   Procedure Laterality Date    CARDIAC CATHETERIZATION N/A 05/11/2022    Procedure: CARDIAC RHC/LHC; Surgeon: Kae Calle MD;  Location: [facility] CARDIAC CATH LAB; Service: Cardiology    COLONOSCOPY              07/22/22 0826   OT Last Visit   OT Visit Date 07/22/22   Note Type   Note type Evaluation   Restrictions/Precautions   Other Precautions Cardiac/sternal;Multiple lines;Telemetry   Pain Assessment   Pain Assessment Tool 0-10   Pain Score No Pain   Home Living   Type of [address] Two level;Stairs to enter with rails   Bathroom Shower/Tub Walk-in shower   Bathroom Toilet Standard   Additional Comments Pt reports living in a 2 story home with 3 RAFI     Prior Function   Level of [place] Independent with ADLs and functional mobility   Lives With Spouse   Receives Help From Family   ADL Assistance Independent   IADLs Independent   Falls in the last 6 months 0   Vocational Retired   Lifestyle   Autonomy Pt reports being I with ADLS, IADLS and mobility without device PTA  (+)    Reciprocal Relationships Pt lives with his wife who is able to assist as needed upon d/c   Service to Others Retired   Reyna Pugh Enjoys doing work around the [address] [place] [address]   Where Mamie Hassan [phone] Independent   Grooming Assistance 7  Independent   UB Bathing Assistance 5  Supervision/Setup   LB Bathing Assistance 5  Supervision/Setup   UB Dressing Assistance 5  Supervision/Setup   LB Dressing Assistance 5  Supervision/Setup   Toileting Assistance  5  Supervision/Setup   Transfers   Sit to Stand 6  Modified independent   Stand to Sit 6  Modified independent   Functional Mobility   Functional Mobility 6  Modified independent   Additional Comments Pt demonstrated household mobility with no device or rest breaks  Balance   Static Sitting Good   Dynamic Sitting Good   Static Standing Fair +   Dynamic Standing Fair   Ambulatory Fair   Activity Tolerance   Activity Tolerance Patient tolerated treatment well   Medical Staff Made Aware Seen with PT 2* medical complexity/ instability   Nurse Made Aware RN confirmed okay to see pt   RUE Assessment   RUE Assessment WFL   LUE Assessment   LUE Assessment WFL   Cognition   Overall Cognitive Status WFL   Arousal/Participation Alert; Cooperative   Attention Within functional limits   Orientation Level Oriented X4   Memory Within functional limits   Following Commands Follows all commands and directions without difficulty   Comments Pt is pleasant and cooperative  Pt participated in cardiac packet this session  Assessment   Assessment Pt is a 78 y o  male admitted to [facility] on 7/21/2022 w/ severe aortic stenosis s/p TAVR on 7/21/22  Pt  has a past medical history of Arthritis, Concussion, Elevated PSA, Hyperlipidemia, Murmur, heart (2021), Se[facility]ure ([facility] ), TIA (transient ischemic attack), and Traumatic brain injury ([facility] )  Pt with active OT orders and ambulate  orders  Pt resides in a 2 story home with 3 RAFI  Pt lives with his wife who is able to assist as needed upon d/c  Pt was I w/  ADLS and IADLS, (+) drove, & required no use of DME PTA  Currently pt is mod I for functional transfers and supervision for functional mobility and ADLS overall   Based on the aforementioned OT evaluation, functional performance deficits, and assessments, pt has been identified as a moderate complexity evaluation  From OT standpoint, anticipate d/c home with family support  Recommend continued participation in [facility] and functional mobility with staff  No further acute OT needs, d/c OT     Goals   Patient Goals To return home today   Recommendation   OT Discharge Recommendation No rehabilitation needs  (Home with increaed social support)   AM-PAC Daily Activity Inpatient   Lower Body Dressing 4   Bathing 4   Toileting 4   Upper Body Dressing 4   Grooming 4   Eating 4   Daily Activity Raw Score 24   Daily Activity Standardized Score (Calc for Raw Score >=11) 57 54   AM-PAC Applied Cognition Inpatient   Following a Speech/Presentation 4   Understanding Ordinary Conversation 4   Taking Medications 4   Remembering Where Things Are Placed or Put Away 4   Remembering List of 4-5 Errands 4   Taking Care of Complicated Tasks 4   Applied Cognition Raw Score 24   Applied Cognition Standardized Score 62 21   Modified Matt Scale   Modified Matt Scale 2       Kayla Rivera, MOT, OTR/L

## 2022-07-22 NOTE — PHYSICAL THERAPY NOTE
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 ([facility] [address] )    Fuchs' corneal dystrophy    Seizure disorder ([facility] )    Elevated PSA    SNHL (sensorineural hearing loss)    Subarachnoid hemorrhage ([facility] )    COVID-19    Medicare annual wellness visit, subsequent    Rheumatoid arthritis of multiple sites with negative rheumatoid factor ([facility] )    Chest pain    Aortic valve stenosis    DICKERSON (dyspnea on exertion)    S/P TAVR (transcatheter aortic valve replacement)    Memory loss       Past Medical History  Past Medical History:   Diagnosis Date    Arthritis     Concussion     Elevated PSA     Hyperlipidemia     Murmur, heart 2021    Seizure ([facility] )     TIA (transient ischemic attack)     Traumatic brain injury [facility])        Past Surgical History  Past Surgical History:   Procedure Laterality Date    CARDIAC CATHETERIZATION N/A 05/11/2022    Procedure: CARDIAC RHC/LHC; Surgeon: Svetlana Reid MD;  Location: [facility] CARD[facility]ATH LAB; Service: Cardiology    COLONOSCOPY          07/22/22 0827   PT Last Visit   PT Visit Date 07/22/22   Note Type   Note type Evaluation   Pain Assessment   Pain Assessment Tool 0-10   Pain Score No Pain   Restrictions/Precautions   Other Precautions Cardiac/sternal;Multiple lines;Telemetry   Home Living   Type of [address] Two level;Stairs to enter with rails   [address] [facility]   Additional Comments Pt lives in a [address] with 2 RAFI   Was ambulating independently without AD PTA   Prior Function   Level of [place] Independent with ADLs and functional mobility   Lives With Spouse   Receives Help From Family   ADL Assistance Independent   IADLs Independent   Falls in the last 6 months 0 Vocational Retired   Cognition   Overall Cognitive Status WFL   Attention Within functional limits   Orientation Level Oriented X4   Memory Within functional limits   Following Commands Follows all commands and directions without difficulty   RLE Assessment   RLE Assessment WFL   LLE Assessment   LLE Assessment WFL   Bed Mobility   Additional Comments pt seated OOB upon arrival   Transfers   Sit to Stand 6  Modified independent   Stand to Sit 6  Modified independent   Additional Comments transfers without AD   Ambulation/Elevation   Gait pattern Short stride; Inconsistent juan   Gait Assistance 5  Supervision   Additional items Assist x 1  (for line management)   Assistive Device None   Distance 260ft   Stair Management Assistance 5  Supervision   Stair Management Technique Foreward;Reciprocal   Number of Stairs 7   Ambulation/Elevation Additional Comments LHR with stair ascent, no HR with stair descent   Balance   Static Sitting Good   Dynamic Sitting Good   Static Standing Fair +   Dynamic Standing Fair   Ambulatory Fair   Activity Tolerance   Activity Tolerance Patient tolerated treatment well   Medical Staff Made Aware Seen with OT 2* pt's medical complexity and multiple comorbidities  PT focus on functional transfers, gait, endurance, and stairs   Nurse Made Aware ok to see per RN   Assessment   Prognosis Good   Assessment Pt is a 78 y o  male seen for PT evaluation s/p admit to [facility] on 7/21/2022  Pt was admitted with a primary dx of: aortic stenosis  Pt is POD#1 s/p TAVR  PT now consulted for assessment of mobility and d/c needs  Pt with Activity as tolerated, Ambulate, PT evaluation orders  Pts current comorbidities effecting treatment include: arthritis, concussion, seizure, TIA, TBI  Pts current clinical presentation is Evolving (medium complexity) due to Ongoing medical management for primary dx, Ongoing telemetry monitoring, Trending lab values, s/p surgical intervention   Prior to admission, pt was living with his wife in a [facility]  Pt is normally I with ADLs and ambulates without any AD  Upon evaluation, pt currently is at mod (I) level for transfers; supervision for ambulation 260 ft w/ no AD; and supervision for navigating 7 steps up/down with 1 vs no HR  Pt presents at PT eval functioning at/near baseline mobility level  No acute PT needs identified  PT signing off  Pt would benefit from continued ambulation with nursing and restorative staff as able while admitted  At conclusion of PT session pt returned back in chair with phone and call bell within reach  Pt denies any further questions at this time  The patient's AM-PAC Basic Mobility Inpatient Short Form Raw Score is 22  A Raw score of greater than 16 suggests the patient may benefit from discharge to home  Please also refer to the recommendation of the Physical Therapist for safe discharge planning  Recommend home with no PT needs upon hospital D/C  Goals   Patient Goals to go home today   Plan   PT Frequency Other (Comment)  (eval only, D/C PT)   Recommendation   PT Discharge Recommendation No rehabilitation needs   AM-PAC Basic Mobility Inpatient   Turning in Bed Without Bedrails 4   Lying on Back to Sitting on Edge of Flat Bed 4   Moving Bed to Chair 4   Standing Up From Chair 4   Walk in Room 3   Climb 3-5 Stairs 3   Basic Mobility Inpatient Raw Score 22   Basic Mobility Standardized Score 47 4   Highest Level Of Mobility   JH-HLM Goal 7: Walk 25 feet or more   JH-HLM Achieved 8: Walk 250 feet ot more   Modified [place] Scale   Modified [place] Scale 2   End of Consult   Patient Position at End of Consult Bedside chair; All needs within reach       Roberto Sanford, PT, DPT

## 2022-07-22 NOTE — PROGRESS NOTES
Progress Note - Cardiology   Diomedes Haddad 78 y o  male MRN: [id number]  Unit/Bed#: [facility] 401-01 Encounter: [id number]    Assessment/Plan:  #  Severe AS s/p TAVR   #  HLD      -Doing well post TAVR  Of nicardipine infusion  Blood pressure is well controlled  He appears euvolemic on exam   His post TAVR ECG and telemetry without any AV block   -Post TAVR TTE reviewed  -Continue aspirin and clopidogrel  -Not on BB for sinus bradycardia  -Follow up with cardiology arranged    Subjective/Objective   Chief Complaint: 78 M with history of hyperlipidemia, traumatic SAH (2009) and severe aortic stenosis who is seen post TAVR  Briefly, he was evaluated by our group for a murmur noted on exam  Echo revealed severe aortic stenosis with ELMER <1 0 cm2 and MG 40 mmHg  He underwent cardiac cath on 5/11/22 and no significant epicardial disease noted  Presented for elective valve replacement and is now status post L transfemoral 29 mm Jay RADU 3 bioprosthetic valve  Interval history:  No acute events overnight  Feels well this morning and denies any lightheadedness, chest pain, palpitations, shortness of breath, orthopnea      Objective:   Vitals: /74 (BP Location: Left arm)   Pulse 58   Temp 97 7 °F (36 5 °C) (Oral)   Resp 18   Ht 5' 11 5" (1 816 m)   Wt 95 2 kg (209 lb 14 1 oz)   SpO2 95%   BMI 28 86 kg/m²   Vitals:    07/22/22 0600 07/22/22 0709   Weight: 95 2 kg (209 lb 14 1 oz) 95 2 kg (209 lb 14 1 oz)     Orthostatic Blood Pressures    Flowsheet Row Most Recent Value   Blood Pressure 135/74 filed at 07/22/2022 1058   Patient Position - Orthostatic VS Sitting filed at 07/22/2022 1058            Intake/Output Summary (Last 24 hours) at 7/22/2022 1416  Last data filed at 7/22/2022 1145  Gross per 24 hour   Intake 1258 75 ml   Output 2300 ml   Net -1041 25 ml       Invasive Devices  Report    Central Venous Catheter Line  Duration           CVC Central Lines 07/21/22 Triple 1 day          Peripheral Intravenous Line  Duration           Peripheral IV 07/21/22 Dorsal (posterior); Left Hand 1 day                Review of Systems:  All other systems reviewed and negative except for that noted above    Physical Exam: General appearance: alert and oriented, in no acute distress  Neck: no JVD and supple, symmetrical, trachea midline  Lungs: clear to auscultation bilaterally  Heart: regular rate and rhythm, S1, S2 normal, no murmur, click, rub or gallop  Abdomen: soft, non-tender; bowel sounds normal; no masses,  no organomegaly  Pulses: 2+ and symmetric  Skin: Skin color, texture, turgor normal  No rashes or lesions    Lab Results: I have personally reviewed pertinent lab results  Imaging: I have personally reviewed pertinent reports      EKG: Personally reviewed  VTE Pharmacologic Prophylaxis: Heparin

## 2022-07-25 ENCOUNTER — TRANSITIONAL CARE MANAGEMENT (OUTPATIENT)
Dept: FAMILY MEDICINE CLINIC | Facility: CLINIC | Age: 77
End: 2022-07-25

## 2022-07-26 ENCOUNTER — TELEPHONE (OUTPATIENT)
Dept: CARDIAC SURGERY | Facility: CLINIC | Age: 77
End: 2022-07-26

## 2022-07-26 NOTE — TELEPHONE ENCOUNTER
Spoke with Leidy Templeton today for follow up from discharge home on 7/22 after undergoing TAVR on 7/21  He states he is doing well - doing incentive spirometry multiple times a day, and walking at least a mile daily  He feels well - left groin incision is healing well - he only has bruising  He denies lightheadedness and dizziness, GI issues, fevers and chills  Reviewed upcoming appointments  He will see us on 8/31 with Dr Henrik Arroyo for postop TAVR visit

## 2022-08-01 NOTE — PROGRESS NOTES
Cardiac Rehabilitation Plan of Care   Initial Care Plan      Today's date: 2022   # of Exercise Sessions Completed: Evaluation  Patient name: Diomedes Haddad      : 1945  Age: 78 y o  MRN: [id number]  Referring Physician: Jose Turner DO  Cardiologist: Caroline Gore MD  Provider: [facility]  Clinician: Hiren Jasso MS    Dx: S/P TAVR  Date of onset: 22      SUMMARY OF PROGRESS:  Today is Derrek's initial evaluation to begin Cardiac Rehab  The patient does currently follow a formal exercise program at home, including walking 1 mile daily (about 18 minutes)  Patient has a fitness tracker on his phone that monitors his steps and distance  He has resumed all ADLs without fatigue and tolerating them well  Depression screening using the PHQ-9 interprets the patient's score 0 =No Depression  LUZ-7 screening tool for anxiety suggests 0-4  = Not anxious  When addressed, the patient denies having depression/anxiety  Patient reports excellent social/emotional support  Information to begin using [employer] was provided as well as contact information for counseling through [employer]  PHQ-9 score will be reassessed in 30 days  The patient is a non-smoker  Patient admits to 100% medication compliance  Patient reports the following physical limitations: N/A  The patient completed an initial submaximal TM ETT  The patient completed 9 minutes of stage III (4 3METs) with test termination of RHR +30  Resting  /68 with appropriate hemodynamic response to exercise reaching 142/76  Patient denied symptoms during exercise  Telemetry revealed NSR  Patient was counseled on exercise guidelines to achieve a minimum of 150 mins/wk of moderate intensity (RPE 4-6) exercise and encouraged to add 1-2 days of exercise on opposite days of cardiac rehab as tolerated   Patient's goals include: increase strength, improve functional capacity, get in better shape, lose 5 lbs this month, set a goal of walking 2 miles/day, walk the [place] with incline, enjoy family and traveling, improve balance for golf  The patient's CAD risk factors include:  stress and obesity/overweight  His education will focus on lifestyle modification/education specific to His needs  Patient will attend group education classes on heart healthy eating, reading food labels, stress management, risk factor reduction, understanding heart disease and common heart medications  Patient will attend 35 monitored exercise sessions, 3x/wk for 12-18 weeks beginning 8/3/22  Patient stated he was an active individual prior to his procedure and has continued to be active 3 days after his procedure  Patient stated he is walking 1 mile daily each morning, which take him about 18 minutes  Patient would like to retain better balance for ADLs and for golf  Patient started making dietary changes and will continue to educate himself throughout rehab       Medication compliance: Yes   Comments: Pt reports to be compliant with medications  Fall Risk: Low   Comments: Ambulates with a steady gait with no assist device    EKG Interpretation: NSR      EXERCISE ASSESSMENT and PLAN    Current Exercise Program in Rehab:       Frequency: 3 days/week Supplement with home exercise 2+ days/wk as tolerated       Minutes: 35-40         METS: 3 5-4 0            HR: 20-30 beats above RHR    RPE: 4-6      Modalities: Treadmill, UBE and NuStep      Exercise Progression 30 Day Goals :    Frequency: 3 days/week of cardiac rehab     Supplement with home exercise 2+ days/wk as tolerated    Minutes: 45-50                          >150 mins/wk of moderate intensity exercise   METS: 4 0-3 5   HR: 20-30 beats above RHR   RPE: 4-6   Modalities: Treadmill, UBE, NuStep and Recumbent bike    Strength trainin-3 days / week   Modalities: Chest Press, Pull Downs and free weights    Home Exercise: Type: walking 1 mile/day     Goals: 10% improvement in functional capacity - based on max METs achieved in fitness assessment, Resume ADLs with increased strength, Attend Rehab regularly, Decrease sitting time, Start a walking program and start a home exercise program    Progression Toward Goals:  Reviewed Pt goals and determined plan of care, Patient will initiate a walking program at home for at least 30 minutes daily making it a goal to walk 2 miles  in the next 30 days    Education: benefit of exercise for CAD risk factors, home exercise guidelines, AHA guidelines to achieve >150 mins/wk of moderate exercise and RPE scale   Plan:education on home exercise guidelines, home exercise 30+ mins 2 days opposite CR and Education class: Risk Factors for Heart Disease  Readiness to change: Preparation:  (Getting ready to change)       NUTRITION ASSESSMENT AND PLAN    Weight control:    Starting weight: 205   Current weight:   205    Diabetes: N/A  A1c:    last measured: NO A1C ON FILE    Lipid management: Last lipid profile 10/14/21  Chol 169    HDL 59  LDL 85    Goals:LDL <100, HDL >40, TRG <150, CHOL <200, eliminate processed meats, eat 3 or more servings of whole grains a day, Eat 4-5 cups of fruits and vegetables daily, use olive or canola oil in baking, choose low sodium processed foods, eliminate butter and choose healthy snacks: light popcorn, plain pretzels    Progression Toward Goals: Reviewed Pt goals and determined plan of care, Patient will make dietary changes to see improvement in his future lipid panel  in the next 30 days    Education: heart healthy eating  low sodium diet  nutrition for  lipid management  healthy choices while dining out  portion control  education class: Heart Healthy Eating  education class:  Label Reading  Plan: Education class: Reading Food Labels, Education Class: Heart Healthy Eating, replace refined grain bread with whole grain bread, replace unhealthy snacks with fruits & vegs, reduce portion sizes, eat fewer desserts and sweets, avoid processed foods and drink more water  Readiness to change: Preparation:  (Getting ready to change)       PSYCHOSOCIAL ASSESSMENT AND PLAN    Emotional:  Depression assessment:  PHQ-9 = 0 =No Depression            Anxiety measure:  LUZ-7 = 0-4  = Not anxious  Self-reported stress level:  5  Social support: Very Good    Goals:  Reduce perceived stress to 1-3/10, Physical Fitness in [facility] Score < 3, Daily Activity in [place] Score < 3, Social Activities in [place] Score < 3, Increased interest in doing things, improved positive thoughts of well being and increased energy    Progression Toward Goals: Reviewed Pt goals and determined plan of care, Patient will enjoy hobbies to help patient relax, enjoy family, golf  in the next 30 days    Education: signs/sxs of depression, benefits of a positive support system, stress management techniques and depression and CAD  Plan: Exercise, Spend time outdoors, Enjoy a hobby, Keep a positive mindset and Enjoy family  Readiness to change: Preparation:  (Getting ready to change)       OTHER CORE COMPONENTS     Tobacco:   Social History     Tobacco Use   Smoking Status Never Smoker   Smokeless Tobacco Never Used       Tobacco Use Intervention:   N/A:  Patient is a non-smoker     Anginal Symptoms:  None   NTG use: No prescription    Blood pressure:    Restin/68   Exercise: 142/76   Recovery: 110/76    Goals: consistent BP < 130/80, reduced dietary sodium <2300mg, moderate intensity exercise >150 mins/wk and medication compliance    Progression Toward Goals: Reviewed Pt goals and determined plan of care, Patient will monitor BP at home to be sure vitals are elevating properly  in the next 30 days    Education:  understanding high blood pressure and it's relationship to CAD, low sodium diet and HTN and proper use of sublingual NTG  Plan: avoid places with second hand smoke, Avoid Processed foods, engage in regular exercise, eliminate salt shaker at the table and use salt substitutes  Readiness to change: Preparation:  (Getting ready to change)       CARDIAC REHAB ASSESSMENT    Today's date: 2022  Patient name: Rui Moreau     : 1945       MRN: [id number]  PCP: Anamika Lema DO  Referring Physician: Caterina Duncan DO  Cardiologist: Gretchen Holland MD  Surgeon: Pieter Starr DO  Dx: S/P TAVR    Date of onset: 22  Cultural needs: N/A    Weight    Wt Readings from Last 1 Encounters:   22 95 2 kg (209 lb 14 1 oz)      Height:   Ht Readings from Last 1 Encounters:   22 5' 11 5" (1 816 m)     Medical History:   Past Medical History:   Diagnosis Date    Arthritis     Concussion     Elevated PSA     Hyperlipidemia     Murmur, heart     Seizure ([address] )     TIA (transient ischemic attack)     Traumatic brain injury [facility])          Physical Limitations: N/A    Fall Risk: Low   Comments: Ambulates with a steady gait with no assist device    Anginal Equivalent: None/denies angina   NTG use: No prescription    Risk Factors   Cholesterol: No  Smoking: Never used  HTN: No  DM: No  Obesity: No   Inactivity: No  Stress:  perceived  stress: 5/10   Stressors: current health condition    Goals for Stress Management:Exercise, Keep a positive mindset, Consult a Counselor, Spend time outside, Enjoy a hobby and Spend time with family    Family History:  Family History   Problem Relation Age of Onset    No Known Problems Mother     Alzheimer's disease Father     Parkinsonism Sister     Rheum arthritis Sister        Allergies: Patient has no known allergies    ETOH:   Social History     Substance and Sexual Activity   Alcohol Use Yes    Comment: once a day         Current Medications:   Current Outpatient Medications   Medication Sig Dispense Refill    acetaminophen (TYLENOL) 325 mg tablet Take 2 tablets (650 mg total) by mouth every 4 (four) hours as needed for fever (temperature greater than 101 F) 100 tablet 0    Ascorbic Acid (VITAMIN C) 100 MG tablet Take by mouth      aspirin 81 mg chewable tablet Chew 1 tablet (81 mg total) daily 90 tablet 2    B Complex Vitamins (B COMPLEX 50) TABS Take by mouth      clopidogrel (PLAVIX) 75 mg tablet Take 1 tablet (75 mg total) by mouth daily 90 tablet 0    diclofenac sodium (VOLTAREN) 1 % Place on the skin      Glucosamine-Chondroit-Vit C-Mn (GLUCOSAMINE 1500 COMPLEX) CAPS Take by mouth      hydroxychloroquine (PLAQUENIL) 200 mg tablet Take 1 tablet by mouth 2 (two) times a day      loratadine (CLARITIN) 10 mg tablet Take by mouth      omeprazole (PriLOSEC) 20 mg delayed release capsule Take 20 mg by mouth 2 (two) times a day      OXcarbazepine (TRILEPTAL) 150 mg tablet Take 150 mg by mouth 2 (two) times a day      polyethylene glycol (GLYCOLAX) 17 GM/SCOOP powder TAKE 1 CAPFUL (17GM) BY MOUTH ONCE EVERY DAY AS NEEDED MIXED IN WATER OR JUICE      simvastatin (ZOCOR) 10 mg tablet Take 1 tablet by mouth daily      vitamin E, tocopherol, 400 units capsule Take by mouth       No current facility-administered medications for this visit  Functional Status Prior to Diagnosis for Treatment   Occupation: retired  Recreation: Rosie Kenney   ADLs: No limitations  [place]: No limitations  Exercise: walking, lifting   Other: some exposure     Current Functional Status  Occupation: retired  Recreation: see above   ADLs:Capable of performing light to moderate ADLs  [place]: Capable of performing light to moderate ADLs  Exercise: walking   Other: N/A    Patient Specific Goals:   increase strength, improve functional capacity, get in better shape, lose 5 lbs this month, set a goal of walking 2 miles/day, [facility], enjoy family and traveling, improve balance for golf       Short Term Program Goals: dietary modifications increased strength improved energy/stamina with ADLs    Long Term Goals: intial claudication time extended  increased maximal walking duration  increased intial training workload  Improved functional capacity  Improved lipid profile    Ability to reach goals/rehabilitation potential:  Very Good     Projected return to function: 8-12 weeks  Objective tests: sub-max TM ETT      Nutritional   Reviewed details of Rate your Plate  Discussed key elements of heart healthy eating  Reviewed patient goals for dietary modifications and their clinical implications  Reviewed most recent lipid profile  Goals for dietary modification: eliminate processed meats  increase whole grains  increase fruits and vegetables  eliminate butter  low sodium  improved snack choices      Emotional/Social  Patient reports he/she is coping well with good social support and denies depression or anxiety    Marital status:     Domestic Violence Screening: No    Comments: Cardiac rehab is medically necessary for the prevention of a secondary event  Patient stated he is very active now and was very active prior to his procedure  Patient would like to become stronger and keep setting walking goals for himself now  Patient is ready to start exercise to also help him gain better balance for golf and ADLs

## 2022-08-02 ENCOUNTER — OFFICE VISIT (OUTPATIENT)
Dept: FAMILY MEDICINE CLINIC | Facility: CLINIC | Age: 77
End: 2022-08-02
Payer: MEDICARE

## 2022-08-02 ENCOUNTER — CLINICAL SUPPORT (OUTPATIENT)
Dept: CARDIAC REHAB | Facility: HOSPITAL | Age: 77
End: 2022-08-02
Payer: MEDICARE

## 2022-08-02 VITALS
RESPIRATION RATE: 18 BRPM | WEIGHT: 210.2 LBS | DIASTOLIC BLOOD PRESSURE: 70 MMHG | BODY MASS INDEX: 28.47 KG/M2 | SYSTOLIC BLOOD PRESSURE: 128 MMHG | HEIGHT: 72 IN | TEMPERATURE: 97.9 F | HEART RATE: 76 BPM

## 2022-08-02 DIAGNOSIS — I63.9 CEREBROVASCULAR ACCIDENT (CVA), UNSPECIFIED MECHANISM (HCC): ICD-10-CM

## 2022-08-02 DIAGNOSIS — Z95.2 S/P TAVR (TRANSCATHETER AORTIC VALVE REPLACEMENT): Primary | ICD-10-CM

## 2022-08-02 DIAGNOSIS — R97.20 ELEVATED PSA: ICD-10-CM

## 2022-08-02 PROBLEM — R06.00 DOE (DYSPNEA ON EXERTION): Status: RESOLVED | Noted: 2022-04-22 | Resolved: 2022-08-02

## 2022-08-02 PROBLEM — U07.1 COVID-19: Status: RESOLVED | Noted: 2020-11-17 | Resolved: 2022-08-02

## 2022-08-02 PROBLEM — R06.09 DOE (DYSPNEA ON EXERTION): Status: RESOLVED | Noted: 2022-04-22 | Resolved: 2022-08-02

## 2022-08-02 PROBLEM — R41.3 MEMORY LOSS: Status: RESOLVED | Noted: 2022-07-21 | Resolved: 2022-08-02

## 2022-08-02 PROBLEM — R07.9 CHEST PAIN: Status: RESOLVED | Noted: 2021-10-27 | Resolved: 2022-08-02

## 2022-08-02 PROCEDURE — 99495 TRANSJ CARE MGMT MOD F2F 14D: CPT | Performed by: INTERNAL MEDICINE

## 2022-08-02 PROCEDURE — 93797 PHYS/QHP OP CAR RHAB WO ECG: CPT
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
## 2022-08-03 ENCOUNTER — CLINICAL SUPPORT (OUTPATIENT)
Dept: CARDIAC REHAB | Facility: HOSPITAL | Age: 77
End: 2022-08-03
Payer: MEDICARE

## 2022-08-03 DIAGNOSIS — Z95.2 S/P TAVR (TRANSCATHETER AORTIC VALVE REPLACEMENT): ICD-10-CM

## 2022-08-03 PROCEDURE — 93798 PHYS/QHP OP CAR RHAB W/ECG: CPT

## 2022-08-05 ENCOUNTER — CLINICAL SUPPORT (OUTPATIENT)
Dept: CARDIAC REHAB | Facility: HOSPITAL | Age: 77
End: 2022-08-05
Payer: MEDICARE

## 2022-08-05 DIAGNOSIS — Z95.2 S/P TAVR (TRANSCATHETER AORTIC VALVE REPLACEMENT): ICD-10-CM

## 2022-08-05 PROCEDURE — 93798 PHYS/QHP OP CAR RHAB W/ECG: CPT

## 2022-08-08 ENCOUNTER — APPOINTMENT (OUTPATIENT)
Dept: CARDIAC REHAB | Facility: HOSPITAL | Age: 77
End: 2022-08-08
Payer: MEDICARE

## 2022-08-10 ENCOUNTER — TELEPHONE (OUTPATIENT)
Dept: CARDIAC SURGERY | Facility: CLINIC | Age: 77
End: 2022-08-10

## 2022-08-10 ENCOUNTER — APPOINTMENT (OUTPATIENT)
Dept: CARDIAC REHAB | Facility: HOSPITAL | Age: 77
End: 2022-08-10
Payer: MEDICARE

## 2022-08-10 ENCOUNTER — CLINICAL SUPPORT (OUTPATIENT)
Dept: CARDIAC REHAB | Facility: HOSPITAL | Age: 77
End: 2022-08-10
Payer: MEDICARE

## 2022-08-10 DIAGNOSIS — Z95.2 S/P TAVR (TRANSCATHETER AORTIC VALVE REPLACEMENT): ICD-10-CM

## 2022-08-10 PROCEDURE — 93798 PHYS/QHP OP CAR RHAB W/ECG: CPT

## 2022-08-10 NOTE — TELEPHONE ENCOUNTER
Spoke with patient regarding blood work for upcoming 30 day post op appointment  Notified patient of outpatient lab being moved  All questions answered  Patient confirmed appointment time

## 2022-08-12 ENCOUNTER — CLINICAL SUPPORT (OUTPATIENT)
Dept: CARDIAC REHAB | Facility: HOSPITAL | Age: 77
End: 2022-08-12
Payer: MEDICARE

## 2022-08-12 ENCOUNTER — APPOINTMENT (OUTPATIENT)
Dept: CARDIAC REHAB | Facility: HOSPITAL | Age: 77
End: 2022-08-12
Payer: MEDICARE

## 2022-08-12 DIAGNOSIS — Z95.2 S/P TAVR (TRANSCATHETER AORTIC VALVE REPLACEMENT): ICD-10-CM

## 2022-08-12 PROCEDURE — 93798 PHYS/QHP OP CAR RHAB W/ECG: CPT

## 2022-08-15 ENCOUNTER — APPOINTMENT (OUTPATIENT)
Dept: CARDIAC REHAB | Facility: HOSPITAL | Age: 77
End: 2022-08-15
Payer: MEDICARE
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
## 2022-08-16 ENCOUNTER — CLINICAL SUPPORT (OUTPATIENT)
Dept: CARDIOLOGY CLINIC | Facility: HOSPITAL | Age: 77
End: 2022-08-16
Payer: MEDICARE

## 2022-08-16 ENCOUNTER — OFFICE VISIT (OUTPATIENT)
Dept: CARDIOLOGY CLINIC | Facility: HOSPITAL | Age: 77
End: 2022-08-16
Payer: MEDICARE

## 2022-08-16 VITALS
WEIGHT: 206.2 LBS | HEIGHT: 72 IN | HEART RATE: 69 BPM | BODY MASS INDEX: 27.93 KG/M2 | DIASTOLIC BLOOD PRESSURE: 62 MMHG | SYSTOLIC BLOOD PRESSURE: 104 MMHG

## 2022-08-16 DIAGNOSIS — R00.2 PALPITATIONS: ICD-10-CM

## 2022-08-16 DIAGNOSIS — Z95.2 S/P TAVR (TRANSCATHETER AORTIC VALVE REPLACEMENT): ICD-10-CM

## 2022-08-16 DIAGNOSIS — I35.0 SEVERE AORTIC STENOSIS: Primary | ICD-10-CM

## 2022-08-16 DIAGNOSIS — E78.5 DYSLIPIDEMIA: ICD-10-CM

## 2022-08-16 PROCEDURE — 99214 OFFICE O/P EST MOD 30 MIN: CPT | Performed by: PHYSICIAN ASSISTANT

## 2022-08-16 PROCEDURE — 93000 ELECTROCARDIOGRAM COMPLETE: CPT | Performed by: PHYSICIAN ASSISTANT

## 2022-08-16 PROCEDURE — 93242 EXT ECG>48HR<7D RECORDING: CPT | Performed by: INTERNAL MEDICINE

## 2022-08-17 ENCOUNTER — APPOINTMENT (OUTPATIENT)
Dept: CARDIAC REHAB | Facility: HOSPITAL | Age: 77
End: 2022-08-17
Payer: MEDICARE

## 2022-08-17 ENCOUNTER — CLINICAL SUPPORT (OUTPATIENT)
Dept: CARDIAC REHAB | Facility: HOSPITAL | Age: 77
End: 2022-08-17
Payer: MEDICARE

## 2022-08-17 DIAGNOSIS — Z95.2 S/P TAVR (TRANSCATHETER AORTIC VALVE REPLACEMENT): ICD-10-CM

## 2022-08-17 PROCEDURE — 93798 PHYS/QHP OP CAR RHAB W/ECG: CPT

## 2022-08-19 ENCOUNTER — APPOINTMENT (OUTPATIENT)
Dept: CARDIAC REHAB | Facility: HOSPITAL | Age: 77
End: 2022-08-19
Payer: MEDICARE

## 2022-08-22 ENCOUNTER — CLINICAL SUPPORT (OUTPATIENT)
Dept: CARDIAC REHAB | Facility: HOSPITAL | Age: 77
End: 2022-08-22
Payer: MEDICARE

## 2022-08-22 ENCOUNTER — APPOINTMENT (OUTPATIENT)
Dept: CARDIAC REHAB | Facility: HOSPITAL | Age: 77
End: 2022-08-22
Payer: MEDICARE

## 2022-08-22 DIAGNOSIS — Z95.2 S/P TAVR (TRANSCATHETER AORTIC VALVE REPLACEMENT): ICD-10-CM

## 2022-08-22 PROCEDURE — 93798 PHYS/QHP OP CAR RHAB W/ECG: CPT

## 2022-08-23 ENCOUNTER — TELEPHONE (OUTPATIENT)
Dept: UROLOGY | Facility: CLINIC | Age: 77
End: 2022-08-23

## 2022-08-23 ENCOUNTER — HOSPITAL ENCOUNTER (OUTPATIENT)
Dept: RADIOLOGY | Age: 77
Discharge: HOME | End: 2022-08-23
Payer: MEDICARE

## 2022-08-23 DIAGNOSIS — R97.20 ELEVATED PSA: ICD-10-CM

## 2022-08-23 PROCEDURE — 76377 3D RENDER W/INTRP POSTPROCES: CPT

## 2022-08-23 PROCEDURE — A9585 GADOBUTROL INJECTION: HCPCS | Performed by: PHYSICIAN ASSISTANT

## 2022-08-23 PROCEDURE — G1004 CDSM NDSC: HCPCS

## 2022-08-23 PROCEDURE — 72197 MRI PELVIS W/O & W/DYE: CPT

## 2022-08-23 RX ADMIN — GADOBUTROL 9 ML: 604.72 INJECTION INTRAVENOUS at 12:25

## 2022-08-24 ENCOUNTER — APPOINTMENT (OUTPATIENT)
Dept: CARDIAC REHAB | Facility: HOSPITAL | Age: 77
End: 2022-08-24
Payer: MEDICARE

## 2022-08-24 NOTE — TELEPHONE ENCOUNTER
Contacted Radiology reading room to please have patients mpMRI read today so results are available for review at appointment tomorrow  Radiology states they will have results read today

## 2022-08-25 ENCOUNTER — OFFICE VISIT (OUTPATIENT)
Dept: UROLOGY | Facility: CLINIC | Age: 77
End: 2022-08-25
Payer: MEDICARE

## 2022-08-25 VITALS
DIASTOLIC BLOOD PRESSURE: 80 MMHG | WEIGHT: 207 LBS | HEIGHT: 72 IN | BODY MASS INDEX: 28.04 KG/M2 | SYSTOLIC BLOOD PRESSURE: 124 MMHG

## 2022-08-25 DIAGNOSIS — R97.20 ELEVATED PSA: Primary | ICD-10-CM

## 2022-08-25 PROCEDURE — 99213 OFFICE O/P EST LOW 20 MIN: CPT
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
## 2022-08-25 NOTE — Clinical Note
Patient had mpMRI on 8/23/2022 showed PI-RADS category 3  Current PSA 8 3  Please schedule MRI fusion biopsy   Patient will be on vacation in [place] in October for 2 months

## 2022-08-26 ENCOUNTER — CLINICAL SUPPORT (OUTPATIENT)
Dept: CARDIAC REHAB | Facility: HOSPITAL | Age: 77
End: 2022-08-26
Payer: MEDICARE

## 2022-08-26 ENCOUNTER — APPOINTMENT (OUTPATIENT)
Dept: CARDIAC REHAB | Facility: HOSPITAL | Age: 77
End: 2022-08-26
Payer: MEDICARE

## 2022-08-26 DIAGNOSIS — Z95.2 S/P TAVR (TRANSCATHETER AORTIC VALVE REPLACEMENT): ICD-10-CM

## 2022-08-26 PROCEDURE — 93798 PHYS/QHP OP CAR RHAB W/ECG: CPT

## 2022-08-29 ENCOUNTER — APPOINTMENT (OUTPATIENT)
Dept: CARDIAC REHAB | Facility: HOSPITAL | Age: 77
End: 2022-08-29
Payer: MEDICARE

## 2022-08-29 ENCOUNTER — CLINICAL SUPPORT (OUTPATIENT)
Dept: CARDIAC REHAB | Facility: HOSPITAL | Age: 77
End: 2022-08-29
Payer: MEDICARE

## 2022-08-29 ENCOUNTER — CLINICAL SUPPORT (OUTPATIENT)
Dept: CARDIOLOGY CLINIC | Facility: HOSPITAL | Age: 77
End: 2022-08-29
Payer: MEDICARE

## 2022-08-29 DIAGNOSIS — R00.2 PALPITATIONS: ICD-10-CM

## 2022-08-29 DIAGNOSIS — Z95.2 S/P TAVR (TRANSCATHETER AORTIC VALVE REPLACEMENT): Primary | ICD-10-CM

## 2022-08-29 PROCEDURE — 93798 PHYS/QHP OP CAR RHAB W/ECG: CPT

## 2022-08-29 PROCEDURE — 93244 EXT ECG>48HR<7D REV&INTERPJ: CPT | Performed by: INTERNAL MEDICINE

## 2022-08-30 ENCOUNTER — APPOINTMENT (OUTPATIENT)
Dept: LAB | Facility: MEDICAL CENTER | Age: 77
End: 2022-08-30
Payer: MEDICARE

## 2022-08-30 DIAGNOSIS — Z95.2 S/P TAVR (TRANSCATHETER AORTIC VALVE REPLACEMENT): ICD-10-CM

## 2022-08-30 DIAGNOSIS — I35.0 AORTIC VALVE STENOSIS, ETIOLOGY OF CARDIAC VALVE DISEASE UNSPECIFIED: ICD-10-CM

## 2022-08-30 LAB
ANION GAP SERPL CALCULATED.3IONS-SCNC: 3 MMOL/L (ref 4–13)
BASOPHILS # BLD AUTO: 0.03 THOUSANDS/ΜL (ref 0–0.1)
BASOPHILS NFR BLD AUTO: 1 % (ref 0–1)
BUN SERPL-MCNC: 15 MG/DL (ref 5–25)
CALCIUM SERPL-MCNC: 9.2 MG/DL (ref 8.3–10.1)
CHLORIDE SERPL-SCNC: 112 MMOL/L (ref 96–108)
CO2 SERPL-SCNC: 27 MMOL/L (ref 21–32)
CREAT SERPL-MCNC: 0.89 MG/DL (ref 0.6–1.3)
EOSINOPHIL # BLD AUTO: 0.07 THOUSAND/ΜL (ref 0–0.61)
EOSINOPHIL NFR BLD AUTO: 2 % (ref 0–6)
ERYTHROCYTE [DISTWIDTH] IN BLOOD BY AUTOMATED COUNT: 12.8 % (ref 11.6–15.1)
GFR SERPL CREATININE-BSD FRML MDRD: 83 ML/MIN/1.73SQ M
GLUCOSE P FAST SERPL-MCNC: 114 MG/DL (ref 65–99)
HCT VFR BLD AUTO: 41.4 % (ref 36.5–49.3)
HGB BLD-MCNC: 13.9 G/DL (ref 12–17)
IMM GRANULOCYTES # BLD AUTO: 0 THOUSAND/UL (ref 0–0.2)
IMM GRANULOCYTES NFR BLD AUTO: 0 % (ref 0–2)
LYMPHOCYTES # BLD AUTO: 1.58 THOUSANDS/ΜL (ref 0.6–4.47)
LYMPHOCYTES NFR BLD AUTO: 38 % (ref 14–44)
MCH RBC QN AUTO: 34.5 PG (ref 26.8–34.3)
MCHC RBC AUTO-ENTMCNC: 33.6 G/DL (ref 31.4–37.4)
MCV RBC AUTO: 103 FL (ref 82–98)
MONOCYTES # BLD AUTO: 0.4 THOUSAND/ΜL (ref 0.17–1.22)
MONOCYTES NFR BLD AUTO: 10 % (ref 4–12)
NEUTROPHILS # BLD AUTO: 2.12 THOUSANDS/ΜL (ref 1.85–7.62)
NEUTS SEG NFR BLD AUTO: 49 % (ref 43–75)
NRBC BLD AUTO-RTO: 0 /100 WBCS
PLATELET # BLD AUTO: 149 THOUSANDS/UL (ref 149–390)
PMV BLD AUTO: 10.4 FL (ref 8.9–12.7)
POTASSIUM SERPL-SCNC: 3.8 MMOL/L (ref 3.5–5.3)
RBC # BLD AUTO: 4.03 MILLION/UL (ref 3.88–5.62)
SODIUM SERPL-SCNC: 142 MMOL/L (ref 135–147)
WBC # BLD AUTO: 4.2 THOUSAND/UL (ref 4.31–10.16)

## 2022-08-30 PROCEDURE — 80048 BASIC METABOLIC PNL TOTAL CA: CPT

## 2022-08-30 PROCEDURE — 85025 COMPLETE CBC W/AUTO DIFF WBC: CPT

## 2022-08-30 PROCEDURE — 36415 COLL VENOUS BLD VENIPUNCTURE: CPT

## 2022-08-30 NOTE — PROGRESS NOTES
Cardiac Rehabilitation Plan of Care   30 Day Reassessment      Today's date: 2022   # of Exercise Sessions Completed:   Patient name: Jazzy Starr      : 1945  Age: 78 y o  MRN: [id number]  Referring Physician: Giovanni Wright DO  Cardiologist: Kole Swenson MD  Provider: Edie Jacinto  Clinician: Brad King MS    Dx: S/P TAVR  Date of onset: 22      SUMMARY OF PROGRESS:  Yulissa Patricia has attended  sessions of cardiac rehab  Patient is completing about 40 minutes of aerobic exercise and has started strength training  Resting /64, HR 78 bpm  Exercise /70, HR  bpm  Telemetry reads NSR  Patient stated he is enjoying rehab and is feeling better since his initial eval  Patient continues to walk at home for at least 18 minutes completing 1-1 5 miles  Patient recently purchased a watch to keep track of his steps and his distance  Patient has been enjoying golf on his off days of rehab and spending time with family  Yulissa Patricia is making improvements since his first day  Patient is making dietary changes, such as reducing sodium intake and increasing vegetables       Medication compliance: Yes   Comments: Pt reports to be compliant with medications  Fall Risk: Low   Comments: Ambulates with a steady gait with no assist device    EKG Interpretation: NSR      EXERCISE ASSESSMENT and PLAN    Current Exercise Program in Rehab:       Frequency: 3 days/week Supplement with home exercise 2+ days/wk as tolerated       Minutes: 40-45        METS: 4 80-5 37           HR:  bpm    RPE: 4-6      Modalities: Treadmill, Airdyne bike, UBE, Rower and NuStep      Exercise Progression 30 Day Goals :    Frequency: 3 days/week of cardiac rehab     Supplement with home exercise 2+ days/wk as tolerated    Minutes: 45-50                          >150 mins/wk of moderate intensity exercise   METS: 4 5-5 5   HR: 20-30 beats above RHR   RPE: 4-6   Modalities: Treadmill, Airdyne bike, UBE, Elliptical, Rower, NuStep and Recumbent bike    Strength trainin-3 days / week   Modalities: Chest Press, [employer] and free weights    Home Exercise: Type: walking 1-1 5 mile/day     Goals: 10% improvement in functional capacity - based on max METs achieved in fitness assessment, Resume ADLs with increased strength, Attend Rehab regularly, Decrease sitting time, Start a walking program and start a home exercise program    Progression Toward Goals:  Reviewed Pt goals and determined plan of care, Patient will initiate a walking program at home for at least 30 minutes daily making it a goal to walk 2 miles  in the next 30 days    Education: benefit of exercise for CAD risk factors, home exercise guidelines, AHA guidelines to achieve >150 mins/wk of moderate exercise and RPE scale   Plan:education on home exercise guidelines, home exercise 30+ mins 2 days opposite CR and Education class: Risk Factors for Heart Disease  Readiness to change: Preparation:  (Getting ready to change)       NUTRITION ASSESSMENT AND PLAN    Weight control:    Starting weight: 205   Current weight:   209    Diabetes: N/A  A1c:    last measured: NO A1C ON FILE    Lipid management: Last lipid profile 10/14/21  Chol 169    HDL 59  LDL 85    Goals:LDL <100, HDL >40, TRG <150, CHOL <200, eliminate processed meats, eat 3 or more servings of whole grains a day, Eat 4-5 cups of fruits and vegetables daily, use olive or canola oil in baking, choose low sodium processed foods, eliminate butter and choose healthy snacks: light popcorn, plain pretzels    Progression Toward Goals: Reviewed Pt goals and determined plan of care, Patient will make dietary changes to see improvement in his future lipid panel  in the next 30 days    Education: heart healthy eating  low sodium diet  nutrition for  lipid management  healthy choices while dining out  portion control  education class: Heart Healthy Eating  education class:  Label Reading  Plan: Education class: Reading Food Labels, Education Class: Heart Healthy Eating, replace refined grain bread with whole grain bread, replace unhealthy snacks with fruits & vegs, reduce portion sizes, eat fewer desserts and sweets, avoid processed foods and drink more water  Readiness to change: Preparation:  (Getting ready to change)       PSYCHOSOCIAL ASSESSMENT AND PLAN    Emotional:  Depression assessment:  PHQ-9 = 0 =No Depression            Anxiety measure:  LUZ-7 = 0-4  = Not anxious  Self-reported stress level:  5  Social support: Very Good    Goals:  Reduce perceived stress to 1-3/10, Physical Fitness in [facility] Score < 3, Daily Activity in [facility] Score < 3, Social Activities in [place] Score < 3, Increased interest in doing things, improved positive thoughts of well being and increased energy    Progression Toward Goals: Reviewed Pt goals and determined plan of care, Patient will enjoy hobbies to help patient relax, enjoy family, golf  in the next 30 days    Education: signs/sxs of depression, benefits of a positive support system, stress management techniques and depression and CAD  Plan: Exercise, Spend time outdoors, Enjoy a hobby, Keep a positive mindset and Enjoy family  Readiness to change: Preparation:  (Getting ready to change)       OTHER CORE COMPONENTS     Tobacco:   Social History     Tobacco Use   Smoking Status Never Smoker   Smokeless Tobacco Never Used       Tobacco Use Intervention:   N/A:  Patient is a non-smoker     Anginal Symptoms:  None   NTG use: No prescription    Blood pressure:    Restin/64   Exercise: 150/70   Recovery: 120/60    Goals: consistent BP < 130/80, reduced dietary sodium <2300mg, moderate intensity exercise >150 mins/wk and medication compliance    Progression Toward Goals: Reviewed Pt goals and determined plan of care, Patient will monitor BP at home to be sure vitals are elevating properly  in the next 30 days    Education:  understanding high blood pressure and it's relationship to CAD, low sodium diet and HTN and proper use of sublingual NTG  Plan: avoid places with second hand smoke, Avoid Processed foods, engage in regular exercise, eliminate salt shaker at the table and use salt substitutes  Readiness to change: Preparation:  (Getting ready to change)

## 2022-08-30 NOTE — TELEPHONE ENCOUNTER
I spoke to the patient and scheduled his procedure for 10/12/2022 at [place] [address] with Dr Nanette Zapata     -instructions given verbally and mailed  -patient aware to be NPO, needs a  and use an enema 1 hour prior to leaving the house morning of procedure  -patient aware to avoid any potentially blood thinning medications 5 days prior  -CBC, CMP, Urine C&S  2 weeks prior  -MCR/BC - no auth required

## 2022-08-31 ENCOUNTER — HOSPITAL ENCOUNTER (OUTPATIENT)
Dept: NON INVASIVE DIAGNOSTICS | Facility: HOSPITAL | Age: 77
Discharge: HOME | End: 2022-08-31
Payer: MEDICARE

## 2022-08-31 ENCOUNTER — OFFICE VISIT (OUTPATIENT)
Dept: CARDIAC SURGERY | Facility: CLINIC | Age: 77
End: 2022-08-31
Payer: MEDICARE

## 2022-08-31 ENCOUNTER — APPOINTMENT (OUTPATIENT)
Dept: CARDIAC REHAB | Facility: HOSPITAL | Age: 77
End: 2022-08-31
Payer: MEDICARE

## 2022-08-31 VITALS
DIASTOLIC BLOOD PRESSURE: 66 MMHG | HEIGHT: 72 IN | SYSTOLIC BLOOD PRESSURE: 126 MMHG | HEART RATE: 68 BPM | OXYGEN SATURATION: 99 % | WEIGHT: 210.6 LBS | TEMPERATURE: 97.1 F | BODY MASS INDEX: 28.53 KG/M2

## 2022-08-31 VITALS
HEART RATE: 56 BPM | SYSTOLIC BLOOD PRESSURE: 124 MMHG | BODY MASS INDEX: 28.04 KG/M2 | DIASTOLIC BLOOD PRESSURE: 80 MMHG | WEIGHT: 207 LBS | HEIGHT: 72 IN

## 2022-08-31 DIAGNOSIS — I35.0 AORTIC VALVE STENOSIS, ETIOLOGY OF CARDIAC VALVE DISEASE UNSPECIFIED: ICD-10-CM

## 2022-08-31 DIAGNOSIS — Z95.2 S/P TAVR (TRANSCATHETER AORTIC VALVE REPLACEMENT): ICD-10-CM

## 2022-08-31 DIAGNOSIS — Z95.2 S/P TAVR (TRANSCATHETER AORTIC VALVE REPLACEMENT): Primary | ICD-10-CM

## 2022-08-31 LAB
AORTIC ROOT: 3.1 CM
AORTIC VALVE MEAN VELOCITY: 20.9 M/S
APICAL FOUR CHAMBER EJECTION FRACTION: 66 %
ATRIAL RATE: 57 BPM
AV AREA BY CONTINUOUS VTI: 1.5 CM2
AV AREA PEAK VELOCITY: 1.5 CM2
AV LVOT MEAN GRADIENT: 2 MMHG
AV LVOT PEAK GRADIENT: 4 MMHG
AV MEAN GRADIENT: 18 MMHG
AV PEAK GRADIENT: 31 MMHG
AV VALVE AREA: 1.59 CM2
AV VELOCITY RATIO: 0.36
DOP CALC AO PEAK VEL: 2.79 M/S
DOP CALC AO VTI: 64.02 CM
DOP CALC LVOT AREA: 4.15 CM2
DOP CALC LVOT DIAMETER: 2.3 CM
DOP CALC LVOT PEAK VEL VTI: 24.5 CM
DOP CALC LVOT PEAK VEL: 1 M/S
DOP CALC LVOT STROKE INDEX: 44.4 ML/M2
DOP CALC LVOT STROKE VOLUME: 101.74 CM3
E WAVE DECELERATION TIME: 266 MS
FRACTIONAL SHORTENING: 40 % (ref 28–44)
INTERVENTRICULAR SEPTUM IN DIASTOLE (PARASTERNAL SHORT AXIS VIEW): 1.2 CM
INTERVENTRICULAR SEPTUM: 1.2 CM (ref 0.6–1.1)
LAAS-AP2: 24.8 CM2
LAAS-AP4: 17.4 CM2
LEFT ATRIUM SIZE: 3.8 CM
LEFT INTERNAL DIMENSION IN SYSTOLE: 2.8 CM (ref 2.1–4)
LEFT VENTRICULAR INTERNAL DIMENSION IN DIASTOLE: 4.7 CM (ref 3.5–6)
LEFT VENTRICULAR POSTERIOR WALL IN END DIASTOLE: 1.2 CM
LEFT VENTRICULAR STROKE VOLUME: 74 ML
LVSV (TEICH): 74 ML
MV E'TISSUE VEL-SEP: 9 CM/S
MV PEAK A VEL: 0.77 M/S
MV PEAK E VEL: 90 CM/S
MV STENOSIS PRESSURE HALF TIME: 77 MS
MV VALVE AREA P 1/2 METHOD: 2.86 CM2
P AXIS: 62 DEGREES
PR INTERVAL: 176 MS
QRS AXIS: 2 DEGREES
QRSD INTERVAL: 78 MS
QT INTERVAL: 452 MS
QTC INTERVAL: 439 MS
RIGHT ATRIUM AREA SYSTOLE A4C: 19.2 CM2
RIGHT VENTRICLE ID DIMENSION: 3.8 CM
SL CV LEFT ATRIUM LENGTH A2C: 6.3 CM
SL CV LV EF: 65
SL CV PED ECHO LEFT VENTRICLE DIASTOLIC VOLUME (MOD BIPLANE) 2D: 104 ML
SL CV PED ECHO LEFT VENTRICLE SYSTOLIC VOLUME (MOD BIPLANE) 2D: 30 ML
T WAVE AXIS: 22 DEGREES
VENTRICULAR RATE: 57 BPM

## 2022-08-31 PROCEDURE — 93010 ELECTROCARDIOGRAM REPORT: CPT | Performed by: INTERNAL MEDICINE

## 2022-08-31 PROCEDURE — 93306 TTE W/DOPPLER COMPLETE: CPT

## 2022-08-31 PROCEDURE — 93306 TTE W/DOPPLER COMPLETE: CPT | Performed by: INTERNAL MEDICINE

## 2022-08-31 PROCEDURE — 93005 ELECTROCARDIOGRAM TRACING: CPT

## 2022-08-31 PROCEDURE — 99214 OFFICE O/P EST MOD 30 MIN: CPT | Performed by: THORACIC SURGERY (CARDIOTHORACIC VASCULAR SURGERY)

## 2022-08-31 NOTE — LETTER
August 31, 2022     [facility] [address]    Patient: Perlita Padilla   YOB: 1945   Date of Visit: 8/31/2022       Dear Dr Mallorie Valente:    Thank you for referring Laly Soto to me for evaluation  Below are my notes for this consultation  If you have questions, please do not hesitate to call me  I look forward to following your patient along with you  Sincerely,        Gloria Solares DO        CC: DO Ruddy Sanchez Minus, PA-C  8/31/2022  1:52 PM  Attested   POST OP FOLLOW UP VISIT S/P TAVR    Procedure: S/P transfemoral transcatheter aortic valve replacement, performed on 7/21/22  History: Perlita Padilla is a 78y o  year old male who presents to our office today for routine follow up care from transfemoral transcatheter aortic valve replacement  He was discharged home on 7/22 without issue, and has been recovering well at home  He has been walking at least a mile each day, and has been going to cardiac rehab, completed his 10th session today  He has followed up with his cardiologist and PCP  He saw Urology last week and due to an elevated PSA, he will undergo a prostate biopsy in October  He states that he feels better than he has in a long time, and denies CP, SOB, DICKERSON, LE swelling  He did have some palpitations a couple of weeks ago - cardiology set him up with a 1 week Zio patch, which didn't show any events  Patient feels that he may have been drinking too much or just been hyper-aware  No further episodes since that episode       Review of System:     History obtained from the patient  General ROS: negative  Psychological ROS: negative  Ophthalmic ROS: negative  ENT ROS: negative  Allergy and Immunology ROS: negative  Hematological and Lymphatic ROS: negative  Endocrine ROS: negative  Respiratory ROS: no cough, shortness of breath, or wheezing  Cardiovascular ROS: no chest pain or dyspnea on exertion  Gastrointestinal ROS: no abdominal pain, change in bowel habits, or black or bloody stools  Genito-Urinary ROS: no dysuria, trouble voiding, or hematuria  Musculoskeletal ROS: negative  Neurological ROS: no TIA or stroke symptoms  Dermatological ROS: negative    Vital Signs:     Vitals:    08/31/22 1245 08/31/22 1251   BP: 113/62 126/66   BP Location: Left arm Right arm   Patient Position: Sitting Sitting   Cuff Size: Standard Standard   Pulse: 68    Temp: (!) 97 1 °F (36 2 °C)    TempSrc: Tympanic    SpO2: 99%    Weight: 95 5 kg (210 lb 9 6 oz)    Height: 6' (1 829 m)        Home Medications:     Prior to Admission medications    Medication Sig Start Date End Date Taking?  Authorizing Provider   acetaminophen (TYLENOL) 325 mg tablet Take 2 tablets (650 mg total) by mouth every 4 (four) hours as needed for fever (temperature greater than 101 F) 7/22/22  Yes Fernie Cobian PA-C   Ascorbic Acid (VITAMIN C) 100 MG tablet Take by mouth   Yes Historical Provider, MD   aspirin 81 mg chewable tablet Chew 1 tablet (81 mg total) daily 7/22/22 10/20/22 Yes Fernie Cobian PA-C   B Complex Vitamins (B COMPLEX 50) TABS Take by mouth   Yes Historical Provider, MD   clopidogrel (PLAVIX) 75 mg tablet Take 1 tablet (75 mg total) by mouth daily 7/22/22 10/20/22 Yes Fernie Cobian PA-C   diclofenac sodium (VOLTAREN) 1 % Place on the skin 5/16/16  Yes Historical Provider, MD   Glucosamine-Chondroit-Vit C-Mn (GLUCOSAMINE 1500 COMPLEX) CAPS Take by mouth   Yes Historical Provider, MD   hydroxychloroquine (PLAQUENIL) 200 mg tablet Take 1 tablet by mouth 2 (two) times a day   Yes Historical Provider, MD   loratadine (CLARITIN) 10 mg tablet Take by mouth   Yes Historical Provider, MD   omeprazole (PriLOSEC) 20 mg delayed release capsule Take 20 mg by mouth 2 (two) times a day   Yes Historical Provider, MD   OXcarbazepine (TRILEPTAL) 150 mg tablet Take 150 mg by mouth 2 (two) times a day   Yes Historical Provider, MD   polyethylene glycol (GLYCOLAX) 17 GM/SCOOP powder TAKE 1 CAPFUL (17GM) BY MOUTH ONCE EVERY DAY AS NEEDED MIXED IN WATER OR JUICE 4/1/21  Yes Historical Provider, MD   simvastatin (ZOCOR) 10 mg tablet Take 1 tablet by mouth daily   Yes Historical Provider, MD   vitamin E, tocopherol, 400 units capsule Take by mouth   Yes Historical Provider, MD       Physical Exam:    General: alert, pleasant, NAD  HEENT/NECK:  PERRL  No jugular venous distention  Cardiac:Regular rate and rhythm  Pulmonary:Breath sounds clear bilaterally  Abdomen:  Non-tender, Non-distended  Positive bowel sounds  Upper extremities: 2+ radial pulses; brisk capillary refill  Lower extremities: Extremities warm/dry  Bilateral femoral pulses 2+, no bruit; PT/DP pulses 2+ bilaterally  No edema B/L  Incisions: Inguinal incision is clean, dry, and intact  Musculoskeletal: MALDONADO   Neuro: Alert and oriented X 3  Sensation is grossly intact  No focal deficits  Skin: Warm/Dry, without rashes or lesions  Lab Results:     Results from last 7 days   Lab Units 08/30/22  1425   WBC Thousand/uL 4 20*   HEMOGLOBIN g/dL 13 9   HEMATOCRIT % 41 4   PLATELETS Thousands/uL 149     Results from last 7 days   Lab Units 08/30/22  1425   POTASSIUM mmol/L 3 8   CHLORIDE mmol/L 112*   CO2 mmol/L 27   BUN mg/dL 15   CREATININE mg/dL 0 89   CALCIUM mg/dL 9 2       Imaging Studies:     Transthoracic Echocardiogram: 8/31/22  Interpretation Summary         Left Ventricle: Left ventricular cavity size is normal  Wall thickness is moderately increased  There is mild concentric hypertrophy  The left ventricular ejection fraction is 65%  Systolic function is vigorous  Wall motion is normal  Diastolic function is mildly abnormal, consistent with grade I (abnormal) relaxation    Left Atrium: The atrium is mildly dilated    Aortic Valve: There is an Jay RADU 3 Ultra 29 mm TAVR bioprosthetic valve  The prosthetic valve appears well-seated  The valve has been repaired  There is trace transvalvular regurgitation   The gradient recorded across the prosthetic aortic valve is within the expected range  The aortic valve peak velocity is 2 79 m/s  The aortic valve mean gradient is 18 0 mmHg  The dimensionless velocity index is 0 36  The aortic valve area is 1 59 cm2     Mitral Valve: There is mild annular calcification          Findings    Left Ventricle Left ventricular cavity size is normal  Wall thickness is moderately increased  There is mild concentric hypertrophy  The left ventricular ejection fraction is 65%  Systolic function is vigorous  Wall motion is normal  Diastolic function is mildly abnormal, consistent with grade I (abnormal) relaxation  Right Ventricle Right ventricular cavity size is normal  Systolic function is normal    Left Atrium The atrium is mildly dilated  Right Atrium The atrium is normal in size  Aortic Valve There is an Jay RADU 3 Ultra 29 mm TAVR bioprosthetic valve  The prosthetic valve appears well-seated  The valve has been repaired  There is trace transvalvular regurgitation  The gradient recorded across the prosthetic aortic valve is within the expected range  The aortic valve peak velocity is 2 79 m/s  The aortic valve mean gradient is 18 0 mmHg  The dimensionless velocity index is 0 36  The aortic valve area is 1 59 cm2  Mitral Valve The leaflets exhibit normal mobility  There is mild annular calcification  There is no evidence of regurgitation  There is no evidence of stenosis  Tricuspid Valve Tricuspid valve structure is normal  There is no evidence of regurgitation  There is no evidence of stenosis  There is no indirect evidence of pulmonary hypertension  Pulmonic Valve Pulmonic valve structure is normal  There is no evidence of regurgitation  There is no evidence of stenosis  Ascending Aorta The aortic root is normal in size  IVC/SVC The inferior vena cava is normal in size  Respirophasic changes were normal    Pericardium There is no pericardial effusion   The pericardium is normal in appearance  Left Ventricle Measurements    Function/Volumes   A4C EF 66 %         LVOT stroke volume 101 74 cm3         LVOT stroke volume index 44 4 ml/m2         Dimensions   LVIDd 4 7 cm         LVIDS 2 8 cm         IVSd 1 2 cm         LVPWd 1 2 cm         LVOT area 4 15 cm2         FS 40 %         Diastolic Filling   MV E' Tissue Velocity Septal 9 cm/s         E wave deceleration time 266 ms         MV Peak E Chaka 90 cm/s         MV Peak A Chaka 0 77 m/s          Report Measurements   AV LVOT peak gradient 4 mmHg              Interventricular Septum Measurements    Shunt Ratio   LVOT peak VTI 24 5 cm         LVOT peak chaka 1 m/s              Right Ventricle Measurements    Dimensions   RVID d 3 8 cm               Left Atrium Measurements    Dimensions   LA size 3 8 cm         LA length (A2C) 6 3 cm               Right Atrium Measurements    Dimensions   RAA A4C 19 2 cm2               Atrial Septum Measurements    Shunt Ratio   LVOT peak VTI 24 5 cm         LVOT peak chaka 1 m/s               Aortic Valve Measurements    Stenosis   Aortic valve peak velocity 2 79 m/s         LVOT peak chaka 1 m/s         Ao VTI 64 02 cm         LVOT peak VTI 24 5 cm         AV mean gradient 18 mmHg         LVOT mn grad 2 mmHg         AV peak gradient 31 mmHg         AV LVOT peak gradient 4 mmHg         Dimensionless velociy index 0 36          Area/Dimensions   AV valve area 1 59 cm2         AV area by cont VTI 1 5 cm2         AV area peak chaka 1 5 cm2         LVOT diameter 2 3 cm         LVOT area 4 15 cm2               Mitral Valve Measurements    Stenosis   MV stenosis pressure 1/2 time 77 ms         MV valve area p 1/2 method 2 86 cm2               Aorta Measurements    Aortic Dimensions   Ao root 3 1 cm                   EKG: pending    I have personally reviewed pertinent reports  TAVR evaluation Assessment:     Gutierrez [address]: I    Assessment:   Aortic stenosis, Non-Rheumatic     S/P transfemoral transcatheter aortic valve replacement    General Soriano is making good progress in their post-op recovery  They are at NYHA functional class I  Left Groin incision is well healed  Weight and VS are stable  Recent echocardiogram demonstrates well seated valve   ECG pending  BMP & CBC WNL  Plan:   Medications reviewed with patient  Recommended Plavix therapy after TAVR is for 90 days only but Aspirin 81 mg daily is lifelong  As per patient's Neurologist, he should resume Aggrenox after completion of Plavix  Benefits of participating in cardiac rehabilitation discussed with patient and they are cleared to proceed with the program  May resume driving and all normal activities  General Soriano will return for one year follow-up in our office with repeat echocardiogram, ECG, CBC & BMP  Our office will contact patient to schedule appointment  They have been advised to maintain routine follow up with their cardiologist and PCP for ongoing medical care  Patient was comfortable with our recommendations and their questions were answered to their satisfaction  There is a current GI referral for colonoscopy surveillance  Tonio No PA-C  [unfilled]  1:30 PM  Attestation signed by Tala Kramer DO at 8/31/2022  1:54 PM:  I supervised the Advanced Practitioner  ? I performed, in its entirety, the assessment/plan component of the visit  I agree with the Advanced Practitioner's note with the following additions/exceptions:      Mr Gould Figures presents in follow-up after his TAVR  He is exercising well and feels much improved from preoperatively  An echocardiogram was performed today and reviewed by myself personally  This demonstrates a well-functioning bioprosthetic valve with no evidence of perivalvular regurgitation  At this point he is released outpatient cardiac rehab  He will continue to follow up with a 1 year echocardiogram in the valve Clinic      Tala Kramer DO 08/31/22

## 2022-08-31 NOTE — PROGRESS NOTES
POST OP FOLLOW UP VISIT S/P TAVR    Procedure: S/P transfemoral transcatheter aortic valve replacement, performed on 7/21/22  History: Alexandre Mtz is a 78y o  year old male who presents to our office today for routine follow up care from transfemoral transcatheter aortic valve replacement  He was discharged home on 7/22 without issue, and has been recovering well at home  He has been walking at least a mile each day, and has been going to cardiac rehab, completed his 10th session today  He has followed up with his cardiologist and PCP  He saw Urology last week and due to an elevated PSA, he will undergo a prostate biopsy in October  He states that he feels better than he has in a long time, and denies CP, SOB, DICKERSON, LE swelling  He did have some palpitations a couple of weeks ago - cardiology set him up with a 1 week Zio patch, which didn't show any events  Patient feels that he may have been drinking too much or just been hyper-aware  No further episodes since that episode       Review of System:     History obtained from the patient  General ROS: negative  Psychological ROS: negative  Ophthalmic ROS: negative  ENT ROS: negative  Allergy and Immunology ROS: negative  Hematological and Lymphatic ROS: negative  Endocrine ROS: negative  Respiratory ROS: no cough, shortness of breath, or wheezing  Cardiovascular ROS: no chest pain or dyspnea on exertion  Gastrointestinal ROS: no abdominal pain, change in bowel habits, or black or bloody stools  Genito-Urinary ROS: no dysuria, trouble voiding, or hematuria  Musculoskeletal ROS: negative  Neurological ROS: no TIA or stroke symptoms  Dermatological ROS: negative    Vital Signs:     Vitals:    08/31/22 1245 08/31/22 1251   BP: 113/62 126/66   BP Location: Left arm Right arm   Patient Position: Sitting Sitting   Cuff Size: Standard Standard   Pulse: 68    Temp: (!) 97 1 °F (36 2 °C)    TempSrc: Tympanic    SpO2: 99%    Weight: 95 5 kg (210 lb 9 6 oz)    Height: 6' (1 829 m)        Home Medications:     Prior to Admission medications    Medication Sig Start Date End Date Taking? Authorizing Provider   acetaminophen (TYLENOL) 325 mg tablet Take 2 tablets (650 mg total) by mouth every 4 (four) hours as needed for fever (temperature greater than 101 F) 7/22/22  Yes Pravin Cárdenas PA-C   Ascorbic Acid (VITAMIN C) 100 MG tablet Take by mouth   Yes Historical Provider, MD   aspirin 81 mg chewable tablet Chew 1 tablet (81 mg total) daily 7/22/22 10/20/22 Yes Pravin Cárdenas PA-C   B Complex Vitamins (B COMPLEX 50) TABS Take by mouth   Yes Historical Provider, MD   clopidogrel (PLAVIX) 75 mg tablet Take 1 tablet (75 mg total) by mouth daily 7/22/22 10/20/22 Yes Pravin Cárdenas PA-C   diclofenac sodium (VOLTAREN) 1 % Place on the skin 5/16/16  Yes Historical Provider, MD   Glucosamine-Chondroit-Vit C-Mn (GLUCOSAMINE 1500 COMPLEX) CAPS Take by mouth   Yes Historical Provider, MD   hydroxychloroquine (PLAQUENIL) 200 mg tablet Take 1 tablet by mouth 2 (two) times a day   Yes Historical Provider, MD   loratadine (CLARITIN) 10 mg tablet Take by mouth   Yes Historical Provider, MD   omeprazole (PriLOSEC) 20 mg delayed release capsule Take 20 mg by mouth 2 (two) times a day   Yes Historical Provider, MD   OXcarbazepine (TRILEPTAL) 150 mg tablet Take 150 mg by mouth 2 (two) times a day   Yes Historical Provider, MD   polyethylene glycol (GLYCOLAX) 17 GM/SCOOP powder TAKE 1 CAPFUL (17GM) BY MOUTH ONCE EVERY DAY AS NEEDED MIXED IN WATER OR JUICE 4/1/21  Yes Historical Provider, MD   simvastatin (ZOCOR) 10 mg tablet Take 1 tablet by mouth daily   Yes Historical Provider, MD   vitamin E, tocopherol, 400 units capsule Take by mouth   Yes Historical Provider, MD       Physical Exam:    General: alert, pleasant, NAD  HEENT/NECK:  PERRL  No jugular venous distention  Cardiac:Regular rate and rhythm  Pulmonary:Breath sounds clear bilaterally  Abdomen:  Non-tender, Non-distended    Positive bowel sounds  Upper extremities: 2+ radial pulses; brisk capillary refill  Lower extremities: Extremities warm/dry  Bilateral femoral pulses 2+, no bruit; PT/DP pulses 2+ bilaterally  No edema B/L  Incisions: Inguinal incision is clean, dry, and intact  Musculoskeletal: MALDONADO   Neuro: Alert and oriented X 3  Sensation is grossly intact  No focal deficits  Skin: Warm/Dry, without rashes or lesions  Lab Results:     Results from last 7 days   Lab Units 08/30/22  1425   WBC Thousand/uL 4 20*   HEMOGLOBIN g/dL 13 9   HEMATOCRIT % 41 4   PLATELETS Thousands/uL 149     Results from last 7 days   Lab Units 08/30/22  1425   POTASSIUM mmol/L 3 8   CHLORIDE mmol/L 112*   CO2 mmol/L 27   BUN mg/dL 15   CREATININE mg/dL 0 89   CALCIUM mg/dL 9 2       Imaging Studies:     Transthoracic Echocardiogram: 8/31/22  Interpretation Summary         Left Ventricle: Left ventricular cavity size is normal  Wall thickness is moderately increased  There is mild concentric hypertrophy  The left ventricular ejection fraction is 65%  Systolic function is vigorous  Wall motion is normal  Diastolic function is mildly abnormal, consistent with grade I (abnormal) relaxation    Left Atrium: The atrium is mildly dilated    Aortic Valve: There is an Jay RADU 3 Ultra 29 mm TAVR bioprosthetic valve  The prosthetic valve appears well-seated  The valve has been repaired  There is trace transvalvular regurgitation  The gradient recorded across the prosthetic aortic valve is within the expected range  The aortic valve peak velocity is 2 79 m/s  The aortic valve mean gradient is 18 0 mmHg  The dimensionless velocity index is 0 36  The aortic valve area is 1 59 cm2     Mitral Valve: There is mild annular calcification          Findings    Left Ventricle Left ventricular cavity size is normal  Wall thickness is moderately increased  There is mild concentric hypertrophy  The left ventricular ejection fraction is 65%  Systolic function is vigorous  Wall motion is normal  Diastolic function is mildly abnormal, consistent with grade I (abnormal) relaxation  Right Ventricle Right ventricular cavity size is normal  Systolic function is normal    Left Atrium The atrium is mildly dilated  Right Atrium The atrium is normal in size  Aortic Valve There is an Jay RADU 3 Ultra 29 mm TAVR bioprosthetic valve  The prosthetic valve appears well-seated  The valve has been repaired  There is trace transvalvular regurgitation  The gradient recorded across the prosthetic aortic valve is within the expected range  The aortic valve peak velocity is 2 79 m/s  The aortic valve mean gradient is 18 0 mmHg  The dimensionless velocity index is 0 36  The aortic valve area is 1 59 cm2  Mitral Valve The leaflets exhibit normal mobility  There is mild annular calcification  There is no evidence of regurgitation  There is no evidence of stenosis  Tricuspid Valve Tricuspid valve structure is normal  There is no evidence of regurgitation  There is no evidence of stenosis  There is no indirect evidence of pulmonary hypertension  Pulmonic Valve Pulmonic valve structure is normal  There is no evidence of regurgitation  There is no evidence of stenosis  Ascending Aorta The aortic root is normal in size  IVC/SVC The inferior vena cava is normal in size  Respirophasic changes were normal    Pericardium There is no pericardial effusion  The pericardium is normal in appearance                 Left Ventricle Measurements    Function/Volumes   A4C EF 66 %         LVOT stroke volume 101 74 cm3         LVOT stroke volume index 44 4 ml/m2         Dimensions   LVIDd 4 7 cm         LVIDS 2 8 cm         IVSd 1 2 cm         LVPWd 1 2 cm         LVOT area 4 15 cm2         FS 40 %         Diastolic Filling   MV E' Tissue Velocity Septal 9 cm/s         E wave deceleration time 266 ms         MV Peak E Chaka 90 cm/s         MV Peak A Chaka 0 77 m/s          Report Measurements   AV LVOT peak gradient 4 mmHg              Interventricular Septum Measurements    Shunt Ratio   LVOT peak VTI 24 5 cm         LVOT peak angella 1 m/s              Right Ventricle Measurements    Dimensions   RVID d 3 8 cm               Left Atrium Measurements    Dimensions   LA size 3 8 cm         LA length (A2C) 6 3 cm               Right Atrium Measurements    Dimensions   RAA A4C 19 2 cm2               Atrial Septum Measurements    Shunt Ratio   LVOT peak VTI 24 5 cm         LVOT peak angella 1 m/s               Aortic Valve Measurements    Stenosis   Aortic valve peak velocity 2 79 m/s         LVOT peak angella 1 m/s         Ao VTI 64 02 cm         LVOT peak VTI 24 5 cm         AV mean gradient 18 mmHg         LVOT mn grad 2 mmHg         AV peak gradient 31 mmHg         AV LVOT peak gradient 4 mmHg         Dimensionless velociy index 0 36          Area/Dimensions   AV valve area 1 59 cm2         AV area by cont VTI 1 5 cm2         AV area peak angella 1 5 cm2         LVOT diameter 2 3 cm         LVOT area 4 15 cm2               Mitral Valve Measurements    Stenosis   MV stenosis pressure 1/2 time 77 ms         MV valve area p 1/2 method 2 86 cm2               Aorta Measurements    Aortic Dimensions   Ao root 3 1 cm                   EKG: pending    I have personally reviewed pertinent reports  TAVR evaluation Assessment:     Gutierrez [address] 122: I    Assessment:   Aortic stenosis, Non-Rheumatic  S/P transfemoral transcatheter aortic valve replacement    Khai Sen is making good progress in their post-op recovery  They are at NYHA functional class I  Left Groin incision is well healed  Weight and VS are stable  Recent echocardiogram demonstrates well seated valve   ECG pending  BMP & CBC WNL  Plan:   Medications reviewed with patient  Recommended Plavix therapy after TAVR is for 90 days only but Aspirin 81 mg daily is lifelong  As per patient's Neurologist, he should resume Aggrenox after completion of Plavix     Benefits of participating in cardiac rehabilitation discussed with patient and they are cleared to proceed with the program  May resume driving and all normal activities  General Bo will return for one year follow-up in our office with repeat echocardiogram, ECG, CBC & BMP  Our office will contact patient to schedule appointment  They have been advised to maintain routine follow up with their cardiologist and PCP for ongoing medical care  Patient was comfortable with our recommendations and their questions were answered to their satisfaction  There is a current GI referral for colonoscopy surveillance       Tonio No PA-C  [unfilled]  1:30 PM

## 2022-09-01 ENCOUNTER — EVENT RECORDER/EXTENDED HOLTER (OUTPATIENT)
Dept: CARDIOLOGY CLINIC | Facility: CLINIC | Age: 77
End: 2022-09-01

## 2022-09-01 NOTE — PROGRESS NOTES
Duration of monitoring 6 and half days-8/16/2022-8/23/2022    Background data:  Patient had a min HR of 45 bpm, max HR of 164 bpm, and avg HR of 64 bpm  Predominant underlying rhythm was Sinus Rhythm  Ventricular ectopy:    1 run of Ventricular Tachycardia occurred lasting 7 beats with a max rate of 156 bpm (avg 126 bpm)  Isolated VEs were rare (<1 0%), VE Couplets were rare (<1 0%),  and no VE Triplets were present  Ventricular Trigeminy was present  Supraventricular ectopy:    15 Supraventricular Tachycardia runs occurred, the run with the fastest interval lasting 8 beats with a max rate of 164 bpm, the longest lasting 9 beats with an avg rate of 91 bpm    Isolated SVEs were rare (<1 0%), SVE Couplets were rare (<1 0%), and SVE Triplets were rare  (<1 0%)       Symptoms:  Correlated with normal sinus rhythm    Impression:  Overall low density of supraventricular ectopy and ventricular ectopy  One short ventricular ectopic episode-nonsustained VT-7 beats at 156 beats per minute, not symptomatic  Few short bursts of supraventricular ectopy, longest was only 9 beats

## 2022-09-02 ENCOUNTER — CLINICAL SUPPORT (OUTPATIENT)
Dept: CARDIAC REHAB | Facility: HOSPITAL | Age: 77
End: 2022-09-02
Payer: MEDICARE

## 2022-09-02 DIAGNOSIS — Z95.2 S/P TAVR (TRANSCATHETER AORTIC VALVE REPLACEMENT): ICD-10-CM

## 2022-09-02 PROCEDURE — 93798 PHYS/QHP OP CAR RHAB W/ECG: CPT

## 2022-09-07 ENCOUNTER — CLINICAL SUPPORT (OUTPATIENT)
Dept: CARDIAC REHAB | Facility: HOSPITAL | Age: 77
End: 2022-09-07
Payer: MEDICARE

## 2022-09-07 DIAGNOSIS — Z95.2 S/P TAVR (TRANSCATHETER AORTIC VALVE REPLACEMENT): ICD-10-CM

## 2022-09-07 PROCEDURE — 93798 PHYS/QHP OP CAR RHAB W/ECG: CPT

## 2022-09-09 ENCOUNTER — CLINICAL SUPPORT (OUTPATIENT)
Dept: CARDIAC REHAB | Facility: HOSPITAL | Age: 77
End: 2022-09-09
Payer: MEDICARE

## 2022-09-09 DIAGNOSIS — Z95.2 S/P TAVR (TRANSCATHETER AORTIC VALVE REPLACEMENT): ICD-10-CM

## 2022-09-09 PROCEDURE — 93798 PHYS/QHP OP CAR RHAB W/ECG: CPT

## 2022-09-12 ENCOUNTER — APPOINTMENT (OUTPATIENT)
Dept: CARDIAC REHAB | Facility: HOSPITAL | Age: 77
End: 2022-09-12
Payer: MEDICARE

## 2022-09-14 ENCOUNTER — CLINICAL SUPPORT (OUTPATIENT)
Dept: CARDIAC REHAB | Facility: HOSPITAL | Age: 77
End: 2022-09-14
Payer: MEDICARE

## 2022-09-14 DIAGNOSIS — Z95.2 S/P TAVR (TRANSCATHETER AORTIC VALVE REPLACEMENT): ICD-10-CM

## 2022-09-14 PROCEDURE — 93798 PHYS/QHP OP CAR RHAB W/ECG: CPT

## 2022-09-16 ENCOUNTER — CLINICAL SUPPORT (OUTPATIENT)
Dept: CARDIAC REHAB | Facility: HOSPITAL | Age: 77
End: 2022-09-16
Payer: MEDICARE

## 2022-09-16 DIAGNOSIS — Z95.2 S/P TAVR (TRANSCATHETER AORTIC VALVE REPLACEMENT): ICD-10-CM

## 2022-09-16 PROCEDURE — 93798 PHYS/QHP OP CAR RHAB W/ECG: CPT

## 2022-09-19 ENCOUNTER — CLINICAL SUPPORT (OUTPATIENT)
Dept: CARDIAC REHAB | Facility: HOSPITAL | Age: 77
End: 2022-09-19
Payer: MEDICARE

## 2022-09-19 DIAGNOSIS — Z95.2 S/P TAVR (TRANSCATHETER AORTIC VALVE REPLACEMENT): ICD-10-CM

## 2022-09-19 PROCEDURE — 93798 PHYS/QHP OP CAR RHAB W/ECG: CPT

## 2022-09-21 ENCOUNTER — CLINICAL SUPPORT (OUTPATIENT)
Dept: CARDIAC REHAB | Facility: HOSPITAL | Age: 77
End: 2022-09-21
Payer: MEDICARE

## 2022-09-21 DIAGNOSIS — Z95.2 S/P TAVR (TRANSCATHETER AORTIC VALVE REPLACEMENT): ICD-10-CM

## 2022-09-21 PROCEDURE — 93798 PHYS/QHP OP CAR RHAB W/ECG: CPT

## 2022-09-23 ENCOUNTER — CLINICAL SUPPORT (OUTPATIENT)
Dept: CARDIAC REHAB | Facility: HOSPITAL | Age: 77
End: 2022-09-23
Payer: MEDICARE

## 2022-09-23 ENCOUNTER — OFFICE VISIT (OUTPATIENT)
Dept: CARDIOLOGY CLINIC | Facility: HOSPITAL | Age: 77
End: 2022-09-23
Payer: MEDICARE

## 2022-09-23 VITALS
HEART RATE: 80 BPM | BODY MASS INDEX: 28.17 KG/M2 | HEIGHT: 72 IN | SYSTOLIC BLOOD PRESSURE: 110 MMHG | WEIGHT: 208 LBS | DIASTOLIC BLOOD PRESSURE: 72 MMHG

## 2022-09-23 DIAGNOSIS — I35.0 AORTIC VALVE STENOSIS, ETIOLOGY OF CARDIAC VALVE DISEASE UNSPECIFIED: Primary | ICD-10-CM

## 2022-09-23 DIAGNOSIS — Z95.2 S/P TAVR (TRANSCATHETER AORTIC VALVE REPLACEMENT): Primary | ICD-10-CM

## 2022-09-23 DIAGNOSIS — Z95.2 S/P TAVR (TRANSCATHETER AORTIC VALVE REPLACEMENT): ICD-10-CM

## 2022-09-23 DIAGNOSIS — E78.5 DYSLIPIDEMIA: ICD-10-CM

## 2022-09-23 PROCEDURE — 99214 OFFICE O/P EST MOD 30 MIN: CPT | Performed by: INTERNAL MEDICINE

## 2022-09-23 PROCEDURE — 93798 PHYS/QHP OP CAR RHAB W/ECG: CPT

## 2022-09-23 NOTE — TELEPHONE ENCOUNTER
Patient called in requesting to speak with surgery scheduler about possibly moving up his surgery   He said he knows it is unlikely but wanted to ask     Please call him back at [phone]
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
## 2022-09-23 NOTE — PROGRESS NOTES
Cardiac Rehabilitation Plan of Care   60 Day Reassessment      Today's date: 2022   # of Exercise Sessions Completed:   Patient name: Sumaya Craig      : 1945  Age: 78 y o  MRN: [id number]  Referring Physician: Arcenio Jorge DO  Cardiologist: Lord Susanne MD  Provider: Lenny Miller  Clinician: Eugenia Calhoun MS    Dx: S/P TAVR  Date of onset: 22      SUMMARY OF PROGRESS:  Poli Gonzalez has attended  sessions of cardiac rehab  Patient is completing about 45 minutes of aerobic exercise and has started strength training  Patient is able to sustain exercise for a longer period of time without feeling over exerted  Resting /70, HR 72 bpm  Exercise /66, HR  bpm  Telemetry reads NSR  Patient continues to enjoy rehab and looks forward to participate weekly  Patient is staying active golfing 3x/ wk before the season is over and continues to walk 1-1 5 miles on his off days of rehab  Patient stated he is feeling stronger and always asks questions when something comes about  Patient is making dietary changes and will continue to received education appropriate to his needs       Medication compliance: Yes   Comments: Pt reports to be compliant with medications  Fall Risk: Low   Comments: Ambulates with a steady gait with no assist device    EKG Interpretation: NSR      EXERCISE ASSESSMENT and PLAN    Current Exercise Program in Rehab:       Frequency: 3 days/week Supplement with home exercise 2+ days/wk as tolerated       Minutes: 445-50        METS: 4 32-5 61           HR:  bpm    RPE: 4-6      Modalities: Treadmill, Airdyne bike, UBE, Elliptical, Rower and NuStep      Exercise Progression 30 Day Goals :    Frequency: 3 days/week of cardiac rehab     Supplement with home exercise 2+ days/wk as tolerated    Minutes: 45-50                          >150 mins/wk of moderate intensity exercise   METS: 5 0-5 5   HR:  bpm   RPE: 4-6   Modalities: Treadmill, Airdyne bike, UBE, Lifecycle, Elliptical, Rower, NuStep and Recumbent bike    Strength trainin-3 days / week   Modalities: Chest Press, Adah Werner and free weights    Home Exercise: Type: walking 1-1 5 mile/day     Goals: 10% improvement in functional capacity - based on max METs achieved in fitness assessment, Resume ADLs with increased strength, Attend Rehab regularly, Decrease sitting time, Start a walking program and start a home exercise program    Progression Toward Goals:  Reviewed Pt goals and determined plan of care, Patient will initiate a walking program at home for at least 30 minutes daily making it a goal to walk 2 miles  in the next 30 days    Education: benefit of exercise for CAD risk factors, home exercise guidelines, AHA guidelines to achieve >150 mins/wk of moderate exercise and RPE scale   Plan:education on home exercise guidelines, home exercise 30+ mins 2 days opposite CR and Education class: Risk Factors for Heart Disease  Readiness to change: Action:  (Changing behavior)      NUTRITION ASSESSMENT AND PLAN    Weight control:    Starting weight: 205   Current weight:   209    Diabetes: N/A  A1c:    last measured: NO A1C ON FILE    Lipid management: Last lipid profile 10/14/21  Chol 169    HDL 59  LDL 85    Goals:LDL <100, HDL >40, TRG <150, CHOL <200, eliminate processed meats, eat 3 or more servings of whole grains a day, Eat 4-5 cups of fruits and vegetables daily, use olive or canola oil in baking, choose low sodium processed foods, eliminate butter and choose healthy snacks: light popcorn, plain pretzels    Progression Toward Goals: Reviewed Pt goals and determined plan of care, Patient will make dietary changes to see improvement in his future lipid panel  in the next 30 days    Education: heart healthy eating  low sodium diet  nutrition for  lipid management  healthy choices while dining out  portion control  education class: Heart Healthy Eating  education class:  Label Reading  Plan: Education class: Reading Food Labels, Education Class: Heart Healthy Eating, replace refined grain bread with whole grain bread, replace unhealthy snacks with fruits & vegs, reduce portion sizes, eat fewer desserts and sweets, avoid processed foods and drink more water  Readiness to change: Action:  (Changing behavior)      PSYCHOSOCIAL ASSESSMENT AND PLAN    Emotional:  Depression assessment:  PHQ-9 = 0 =No Depression            Anxiety measure:  LUZ-7 = 0-4  = Not anxious  Self-reported stress level:  5  Social support: Very Good    Goals:  Reduce perceived stress to 1-3/10, Physical Fitness in [facility] Score < 3, Daily Activity in [facility] Score < 3, Social Activities in [place] Score < 3, Increased interest in doing things, improved positive thoughts of well being and increased energy    Progression Toward Goals: Reviewed Pt goals and determined plan of care, Patient will enjoy hobbies to help patient relax, enjoy family, golf  in the next 30 days    Education: signs/sxs of depression, benefits of a positive support system, stress management techniques and depression and CAD  Plan: Exercise, Spend time outdoors, Enjoy a hobby, Keep a positive mindset and Enjoy family  Readiness to change: Action:  (Changing behavior)      OTHER CORE COMPONENTS     Tobacco:   Social History     Tobacco Use   Smoking Status Never Smoker   Smokeless Tobacco Never Used       Tobacco Use Intervention:   N/A:  Patient is a non-smoker     Anginal Symptoms:  None   NTG use: No prescription    Blood pressure:    Restin/70   Exercise: 144/66   Recovery: 130/60    Goals: consistent BP < 130/80, reduced dietary sodium <2300mg, moderate intensity exercise >150 mins/wk and medication compliance    Progression Toward Goals: Reviewed Pt goals and determined plan of care, Patient will monitor BP at home to be sure vitals are elevating properly  in the next 30 days    Education:  understanding high blood pressure and it's relationship to CAD, low sodium diet and HTN and proper use of sublingual NTG  Plan: avoid places with second hand smoke, Avoid Processed foods, engage in regular exercise, eliminate salt shaker at the table and use salt substitutes  Readiness to change: Action:  (Changing behavior)

## 2022-09-26 ENCOUNTER — APPOINTMENT (OUTPATIENT)
Dept: CARDIAC REHAB | Facility: HOSPITAL | Age: 77
End: 2022-09-26
Payer: MEDICARE

## 2022-09-28 ENCOUNTER — ANESTHESIA EVENT (OUTPATIENT)
Dept: GASTROENTEROLOGY | Facility: HOSPITAL | Age: 77
End: 2022-09-28

## 2022-09-28 ENCOUNTER — CLINICAL SUPPORT (OUTPATIENT)
Dept: CARDIAC REHAB | Facility: HOSPITAL | Age: 77
End: 2022-09-28
Payer: MEDICARE

## 2022-09-28 DIAGNOSIS — Z95.2 S/P TAVR (TRANSCATHETER AORTIC VALVE REPLACEMENT): ICD-10-CM

## 2022-09-28 PROCEDURE — 93798 PHYS/QHP OP CAR RHAB W/ECG: CPT

## 2022-09-30 ENCOUNTER — APPOINTMENT (OUTPATIENT)
Dept: CARDIAC REHAB | Facility: HOSPITAL | Age: 77
End: 2022-09-30
Payer: MEDICARE

## 2022-10-03 ENCOUNTER — APPOINTMENT (OUTPATIENT)
Dept: CARDIAC REHAB | Facility: HOSPITAL | Age: 77
End: 2022-10-03

## 2022-10-03 ENCOUNTER — CLINICAL SUPPORT (OUTPATIENT)
Dept: CARDIAC REHAB | Facility: HOSPITAL | Age: 77
End: 2022-10-03
Payer: MEDICARE

## 2022-10-03 DIAGNOSIS — Z95.2 S/P TAVR (TRANSCATHETER AORTIC VALVE REPLACEMENT): ICD-10-CM

## 2022-10-03 PROCEDURE — 93798 PHYS/QHP OP CAR RHAB W/ECG: CPT

## 2022-10-03 NOTE — TELEPHONE ENCOUNTER
Pt called and stated he would like to change to date of procedure and does not have to be with Dr Beryle [place]    Pt call [phone]

## 2022-10-03 NOTE — TELEPHONE ENCOUNTER
I spoke to the patient, no sooner than current date of 10/12/2022  Patient asked to reschedule to 12/2022 due to their travel plans  Rescheduled to 12/20/2022 at [facility] with Dr Zayra Persaud 2 weeks prior (patietn returning 12/1/2022)

## 2022-10-04 ENCOUNTER — OFFICE VISIT (OUTPATIENT)
Dept: OTOLARYNGOLOGY | Facility: CLINIC | Age: 77
End: 2022-10-04
Payer: MEDICARE

## 2022-10-04 ENCOUNTER — OFFICE VISIT (OUTPATIENT)
Dept: AUDIOLOGY | Facility: CLINIC | Age: 77
End: 2022-10-04

## 2022-10-04 ENCOUNTER — IMMUNIZATIONS (OUTPATIENT)
Dept: FAMILY MEDICINE CLINIC | Facility: CLINIC | Age: 77
End: 2022-10-04
Payer: MEDICARE

## 2022-10-04 VITALS
SYSTOLIC BLOOD PRESSURE: 136 MMHG | TEMPERATURE: 97.3 F | DIASTOLIC BLOOD PRESSURE: 62 MMHG | WEIGHT: 207 LBS | HEIGHT: 72 IN | HEART RATE: 84 BPM | BODY MASS INDEX: 28.04 KG/M2 | OXYGEN SATURATION: 90 %

## 2022-10-04 DIAGNOSIS — Z23 NEED FOR IMMUNIZATION AGAINST INFLUENZA: Primary | ICD-10-CM

## 2022-10-04 DIAGNOSIS — H61.23 BILATERAL IMPACTED CERUMEN: ICD-10-CM

## 2022-10-04 DIAGNOSIS — H90.3 ASYMMETRIC SNHL (SENSORINEURAL HEARING LOSS): ICD-10-CM

## 2022-10-04 DIAGNOSIS — H90.3 SENSORINEURAL HEARING LOSS (SNHL) OF BOTH EARS: Primary | ICD-10-CM

## 2022-10-04 DIAGNOSIS — H90.3 SENSORY HEARING LOSS, BILATERAL: Primary | ICD-10-CM

## 2022-10-04 PROCEDURE — 99213 OFFICE O/P EST LOW 20 MIN: CPT | Performed by: OTOLARYNGOLOGY

## 2022-10-04 PROCEDURE — 90662 IIV NO PRSV INCREASED AG IM: CPT | Performed by: INTERNAL MEDICINE

## 2022-10-04 PROCEDURE — G0008 ADMIN INFLUENZA VIRUS VAC: HCPCS | Performed by: INTERNAL MEDICINE

## 2022-10-04 NOTE — PROGRESS NOTES
Progress Note    Name:  Saji Jamison  :  1945  Age:  78 y o  Date of Evaluation: 10/04/22     Patient had a wax guard stuck in his hearing aid, removed it as a courtesy       Tessy Fontenot   Clinical Audiologist
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
## 2022-10-05 ENCOUNTER — APPOINTMENT (OUTPATIENT)
Dept: CARDIAC REHAB | Facility: HOSPITAL | Age: 77
End: 2022-10-05

## 2022-10-05 ENCOUNTER — CLINICAL SUPPORT (OUTPATIENT)
Dept: CARDIAC REHAB | Facility: HOSPITAL | Age: 77
End: 2022-10-05
Payer: MEDICARE

## 2022-10-05 DIAGNOSIS — Z95.2 S/P TAVR (TRANSCATHETER AORTIC VALVE REPLACEMENT): ICD-10-CM

## 2022-10-05 PROCEDURE — 93798 PHYS/QHP OP CAR RHAB W/ECG: CPT

## 2022-10-07 ENCOUNTER — CLINICAL SUPPORT (OUTPATIENT)
Dept: CARDIAC REHAB | Facility: HOSPITAL | Age: 77
End: 2022-10-07
Payer: MEDICARE

## 2022-10-07 ENCOUNTER — APPOINTMENT (OUTPATIENT)
Dept: CARDIAC REHAB | Facility: HOSPITAL | Age: 77
End: 2022-10-07

## 2022-10-07 DIAGNOSIS — Z95.2 S/P TAVR (TRANSCATHETER AORTIC VALVE REPLACEMENT): Primary | ICD-10-CM

## 2022-10-07 PROCEDURE — 93798 PHYS/QHP OP CAR RHAB W/ECG: CPT

## 2022-10-07 NOTE — PROGRESS NOTES
Cardiac Rehabilitation Plan of Care   Discharge      Today's date: 10/7/2022   # of Exercise Sessions Completed:   Patient name: Jose Miguel Mae      : 1945  Age: 78 y o  MRN: [id number]  Referring Physician: Deepak Moura DO  Cardiologist: Shukri Munguia MD  Provider: [facility]  Clinician: Erica Barnes MS    Dx: S/P TAVR  Date of onset: 22      SUMMARY OF PROGRESS:  Terri Thompson has attended  sessions of cardiac rehab  Patient is completing about 45 minutes of aerobic exercise and has started strength training  Patient is able to sustain exercise for a longer period of time without feeling over exerted  Resting /60, HR 64 bpm  Exercise /80, HR  bpm  Telemetry reads NSR  Patient was greatly appreciative of his progress from rehab  Patient unfortunately is leaving for [place] for 2 months for the winter and would like to be discharged  Patient was given proper discharge information and was encouraged to continue exercise at a gym  Patient is looking forward to joining the gym and will continue to walk for at least 2 miles daily outside to enjoy the weather  Patient will do lots of golfing as well with friends  Patient has received all education appropriate to his needs         Medication compliance: Yes   Comments: Pt reports to be compliant with medications  Fall Risk: Low   Comments: Ambulates with a steady gait with no assist device    EKG Interpretation: NSR      EXERCISE ASSESSMENT and PLAN    Current Exercise Program in Rehab:       Frequency: 3 days/week Supplement with home exercise 2+ days/wk as tolerated       Minutes: 445-50        METS: 4 97-5 61           HR:  bpm    RPE: 4-5      Modalities: Treadmill, Airdyne bike, UBE, Lifecycle, Elliptical, Rower, NuStep and Recumbent bike      Exercise Progression after Discharge:    Frequency: 3-5 days of gym or home exercise   Minutes: 30-50  >150 mins/wk of moderate intensity exercise   METS: 4 5-5 5   HR:  bpm   RPE: 4-6   Modalities: Treadmill, Airdyne bike, UBE, Lifecycle, Elliptical, Rower, NuStep and Recumbent bike    Strength trainin-3 days / week   Modalities: Chest Press, Pull Downs and free weights    Home Exercise: Type: walking 1-1 5 mile/day     Goals: 10% improvement in functional capacity - based on max METs achieved in fitness assessment, Resume ADLs with increased strength, Attend Rehab regularly, Decrease sitting time, Start a walking program and start a home exercise program    Progression Toward Goals:  Reviewed Pt goals and determined plan of care, Patient will initiate a walking program at home for at least 30 minutes daily making it a goal to walk 2 miles  in the next 30 days, Patient will be encouraged to focus on lifestyle modifications following discharge      Education: benefit of exercise for CAD risk factors, home exercise guidelines, AHA guidelines to achieve >150 mins/wk of moderate exercise and RPE scale   Plan:education on home exercise guidelines, home exercise 30+ mins 2 days opposite CR and Education class: Risk Factors for Heart Disease  Readiness to change: Action:  (Changing behavior)      NUTRITION ASSESSMENT AND PLAN    Weight control:    Starting weight: 205   Current weight:   209    Diabetes: N/A  A1c:    last measured: NO A1C ON FILE    Lipid management: Last lipid profile 10/14/21  Chol 169    HDL 59  LDL 85    Goals:LDL <100, HDL >40, TRG <150, CHOL <200, eliminate processed meats, eat 3 or more servings of whole grains a day, Eat 4-5 cups of fruits and vegetables daily, use olive or canola oil in baking, choose low sodium processed foods, eliminate butter and choose healthy snacks: light popcorn, plain pretzels    Progression Toward Goals: Reviewed Pt goals and determined plan of care, Patient will make dietary changes to see improvement in his future lipid panel  in the next 30 days, Patient will be encouraged to focus on lifestyle modifications following discharge  Education: heart healthy eating  low sodium diet  nutrition for  lipid management  healthy choices while dining out  portion control  education class: Heart Healthy Eating  education class:  Label Reading  Plan: Education class: Reading Food Labels, Education Class: Heart Healthy Eating, replace refined grain bread with whole grain bread, replace unhealthy snacks with fruits & vegs, reduce portion sizes, eat fewer desserts and sweets, avoid processed foods and drink more water  Readiness to change: Action:  (Changing behavior)      PSYCHOSOCIAL ASSESSMENT AND PLAN    Emotional:  Depression assessment:  PHQ-9 = 0 =No Depression            Anxiety measure:  LUZ-7 = 0-4  = Not anxious  Self-reported stress level:  5  Social support: Very Good    Goals:  Reduce perceived stress to 1-3/10, Physical Fitness in [facility] Score < 3, Daily Activity in [facility] Score < 3, Social Activities in [facility] Score < 3, Increased interest in doing things, improved positive thoughts of well being and increased energy    Progression Toward Goals: Reviewed Pt goals and determined plan of care, Patient will enjoy hobbies to help patient relax, enjoy family, golf  in the next 30 days, Patient will be encouraged to focus on lifestyle modifications following discharge      Education: signs/sxs of depression, benefits of a positive support system, stress management techniques and depression and CAD  Plan: Exercise, Spend time outdoors, Enjoy a hobby, Keep a positive mindset and Enjoy family  Readiness to change: Action:  (Changing behavior)      OTHER CORE COMPONENTS     Tobacco:   Social History     Tobacco Use   Smoking Status Never Smoker   Smokeless Tobacco Never Used       Tobacco Use Intervention:   N/A:  Patient is a non-smoker     Anginal Symptoms:  None   NTG use: No prescription    Blood pressure:    Restin/60   Exercise: 160/80   Recovery: 120/74    Goals: consistent BP < 130/80, reduced dietary sodium <2300mg, moderate intensity exercise >150 mins/wk and medication compliance    Progression Toward Goals: Reviewed Pt goals and determined plan of care, Patient will monitor BP at home to be sure vitals are elevating properly  in the next 30 days, Patient will be encouraged to focus on lifestyle modifications following discharge      Education:  understanding high blood pressure and it's relationship to CAD, low sodium diet and HTN and proper use of sublingual NTG  Plan: avoid places with second hand smoke, Avoid Processed foods, engage in regular exercise, eliminate salt shaker at the table and use salt substitutes  Readiness to change: Action:  (Changing behavior)

## 2022-10-10 ENCOUNTER — APPOINTMENT (OUTPATIENT)
Dept: CARDIAC REHAB | Facility: HOSPITAL | Age: 77
End: 2022-10-10

## 2022-10-11 PROBLEM — Z00.00 MEDICARE ANNUAL WELLNESS VISIT, SUBSEQUENT: Status: RESOLVED | Noted: 2020-12-31 | Resolved: 2022-10-11

## 2022-10-12 ENCOUNTER — ANESTHESIA (OUTPATIENT)
Dept: GASTROENTEROLOGY | Facility: HOSPITAL | Age: 77
End: 2022-10-12

## 2022-10-12 ENCOUNTER — APPOINTMENT (OUTPATIENT)
Dept: CARDIAC REHAB | Facility: HOSPITAL | Age: 77
End: 2022-10-12

## 2022-10-14 ENCOUNTER — APPOINTMENT (OUTPATIENT)
Dept: CARDIAC REHAB | Facility: HOSPITAL | Age: 77
End: 2022-10-14

## 2022-10-17 ENCOUNTER — APPOINTMENT (OUTPATIENT)
Dept: CARDIAC REHAB | Facility: HOSPITAL | Age: 77
End: 2022-10-17

## 2022-10-19 ENCOUNTER — APPOINTMENT (OUTPATIENT)
Dept: CARDIAC REHAB | Facility: HOSPITAL | Age: 77
End: 2022-10-19

## 2022-10-21 ENCOUNTER — APPOINTMENT (OUTPATIENT)
Dept: CARDIAC REHAB | Facility: HOSPITAL | Age: 77
End: 2022-10-21

## 2022-10-26 ENCOUNTER — APPOINTMENT (OUTPATIENT)
Dept: CARDIAC REHAB | Facility: HOSPITAL | Age: 77
End: 2022-10-26

## 2022-10-28 ENCOUNTER — APPOINTMENT (OUTPATIENT)
Dept: CARDIAC REHAB | Facility: HOSPITAL | Age: 77
End: 2022-10-28

## 2022-11-02 ENCOUNTER — APPOINTMENT (OUTPATIENT)
Dept: CARDIAC REHAB | Facility: HOSPITAL | Age: 77
End: 2022-11-02
Payer: MEDICARE

## 2022-11-04 ENCOUNTER — APPOINTMENT (OUTPATIENT)
Dept: CARDIAC REHAB | Facility: HOSPITAL | Age: 77
End: 2022-11-04
Payer: MEDICARE

## 2022-11-09 ENCOUNTER — APPOINTMENT (OUTPATIENT)
Dept: CARDIAC REHAB | Facility: HOSPITAL | Age: 77
End: 2022-11-09
Payer: MEDICARE

## 2022-11-11 ENCOUNTER — APPOINTMENT (OUTPATIENT)
Dept: CARDIAC REHAB | Facility: HOSPITAL | Age: 77
End: 2022-11-11
Payer: MEDICARE

## 2022-11-16 ENCOUNTER — APPOINTMENT (OUTPATIENT)
Dept: CARDIAC REHAB | Facility: HOSPITAL | Age: 77
End: 2022-11-16
Payer: MEDICARE

## 2022-11-18 ENCOUNTER — APPOINTMENT (OUTPATIENT)
Dept: CARDIAC REHAB | Facility: HOSPITAL | Age: 77
End: 2022-11-18
Payer: MEDICARE

## 2022-11-23 ENCOUNTER — APPOINTMENT (OUTPATIENT)
Dept: CARDIAC REHAB | Facility: HOSPITAL | Age: 77
End: 2022-11-23
Payer: MEDICARE

## 2022-11-25 ENCOUNTER — APPOINTMENT (OUTPATIENT)
Dept: CARDIAC REHAB | Facility: HOSPITAL | Age: 77
End: 2022-11-25
Payer: MEDICARE

## 2022-11-30 ENCOUNTER — APPOINTMENT (OUTPATIENT)
Dept: CARDIAC REHAB | Facility: HOSPITAL | Age: 77
End: 2022-11-30
Payer: MEDICARE

## 2022-12-12 ENCOUNTER — APPOINTMENT (OUTPATIENT)
Dept: LAB | Facility: MEDICAL CENTER | Age: 77
End: 2022-12-12

## 2022-12-12 DIAGNOSIS — R39.89 SUSPECTED UTI: ICD-10-CM

## 2022-12-12 DIAGNOSIS — R97.20 ELEVATED PROSTATE SPECIFIC ANTIGEN (PSA): ICD-10-CM

## 2022-12-12 DIAGNOSIS — Z01.812 PRE-OPERATIVE LABORATORY EXAMINATION: ICD-10-CM

## 2022-12-12 LAB
ALBUMIN SERPL BCP-MCNC: 4.1 G/DL (ref 3.5–5)
ALP SERPL-CCNC: 73 U/L (ref 46–116)
ALT SERPL W P-5'-P-CCNC: 33 U/L (ref 12–78)
ANION GAP SERPL CALCULATED.3IONS-SCNC: 2 MMOL/L (ref 4–13)
AST SERPL W P-5'-P-CCNC: 26 U/L (ref 5–45)
BASOPHILS # BLD AUTO: 0.02 THOUSANDS/ÂΜL (ref 0–0.1)
BASOPHILS NFR BLD AUTO: 1 % (ref 0–1)
BILIRUB SERPL-MCNC: 0.76 MG/DL (ref 0.2–1)
BUN SERPL-MCNC: 18 MG/DL (ref 5–25)
CALCIUM SERPL-MCNC: 9 MG/DL (ref 8.3–10.1)
CHLORIDE SERPL-SCNC: 111 MMOL/L (ref 96–108)
CO2 SERPL-SCNC: 27 MMOL/L (ref 21–32)
CREAT SERPL-MCNC: 0.82 MG/DL (ref 0.6–1.3)
EOSINOPHIL # BLD AUTO: 0.1 THOUSAND/ÂΜL (ref 0–0.61)
EOSINOPHIL NFR BLD AUTO: 3 % (ref 0–6)
ERYTHROCYTE [DISTWIDTH] IN BLOOD BY AUTOMATED COUNT: 12.9 % (ref 11.6–15.1)
GFR SERPL CREATININE-BSD FRML MDRD: 86 ML/MIN/1.73SQ M
GLUCOSE SERPL-MCNC: 91 MG/DL (ref 65–140)
HCT VFR BLD AUTO: 45 % (ref 36.5–49.3)
HGB BLD-MCNC: 14.9 G/DL (ref 12–17)
IMM GRANULOCYTES # BLD AUTO: 0 THOUSAND/UL (ref 0–0.2)
IMM GRANULOCYTES NFR BLD AUTO: 0 % (ref 0–2)
LYMPHOCYTES # BLD AUTO: 1.81 THOUSANDS/ÂΜL (ref 0.6–4.47)
LYMPHOCYTES NFR BLD AUTO: 44 % (ref 14–44)
MCH RBC QN AUTO: 33.6 PG (ref 26.8–34.3)
MCHC RBC AUTO-ENTMCNC: 33.1 G/DL (ref 31.4–37.4)
MCV RBC AUTO: 102 FL (ref 82–98)
MONOCYTES # BLD AUTO: 0.38 THOUSAND/ÂΜL (ref 0.17–1.22)
MONOCYTES NFR BLD AUTO: 9 % (ref 4–12)
NEUTROPHILS # BLD AUTO: 1.76 THOUSANDS/ÂΜL (ref 1.85–7.62)
NEUTS SEG NFR BLD AUTO: 43 % (ref 43–75)
NRBC BLD AUTO-RTO: 0 /100 WBCS
PLATELET # BLD AUTO: 158 THOUSANDS/UL (ref 149–390)
PMV BLD AUTO: 10.3 FL (ref 8.9–12.7)
POTASSIUM SERPL-SCNC: 3.6 MMOL/L (ref 3.5–5.3)
PROT SERPL-MCNC: 7 G/DL (ref 6.4–8.4)
RBC # BLD AUTO: 4.43 MILLION/UL (ref 3.88–5.62)
SODIUM SERPL-SCNC: 140 MMOL/L (ref 135–147)
WBC # BLD AUTO: 4.07 THOUSAND/UL (ref 4.31–10.16)

## 2022-12-13 LAB — BACTERIA UR CULT: NORMAL

## 2022-12-14 ENCOUNTER — TELEPHONE (OUTPATIENT)
Dept: UROLOGY | Facility: CLINIC | Age: 77
End: 2022-12-14

## 2022-12-14 NOTE — TELEPHONE ENCOUNTER
Called and left detailed message about the prep for patient's upcoming procedure  Per [facility] patient is to stop his Plavix 5 days prior however needs to call his cardiologist to confirm  I am calling in regards to your prep instructions for your appointment at the [address]  on 12/20/22 with Dr Cece Huston  under IV Se  We need you to please make sure to stop all blood thinners (including multivitamins) ( Eliquis or Warfarin should be clariified with Cardiology before stopping) 7 day prior to you appointment  Pt should have nothing to eat after midnight the day before the procedure  The pt will need to make sure they have a   Finally the Pt will need to use an enema at least 2 hour prior to the appointment   If you have any questions please call [phone] and ask for [employer] "

## 2022-12-16 ENCOUNTER — TELEPHONE (OUTPATIENT)
Dept: OTHER | Facility: OTHER | Age: 77
End: 2022-12-16

## 2022-12-16 NOTE — TELEPHONE ENCOUNTER
Patient is returning a call from Jole Martínez with some instructions for his upcoming procedure on 12/20/22  Patients states the message was not clear and he could not hear the entire message    Please call patient back
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
## 2022-12-20 ENCOUNTER — HOSPITAL ENCOUNTER (OUTPATIENT)
Facility: HOSPITAL | Age: 77
Setting detail: OUTPATIENT SURGERY
Discharge: HOME | End: 2022-12-20
Attending: UROLOGY | Admitting: UROLOGY

## 2022-12-20 VITALS
HEART RATE: 54 BPM | OXYGEN SATURATION: 97 % | SYSTOLIC BLOOD PRESSURE: 141 MMHG | TEMPERATURE: 96 F | DIASTOLIC BLOOD PRESSURE: 70 MMHG | RESPIRATION RATE: 18 BRPM

## 2022-12-20 DIAGNOSIS — R97.20 ELEVATED PROSTATE SPECIFIC ANTIGEN (PSA): ICD-10-CM

## 2022-12-20 RX ORDER — PROPOFOL 10 MG/ML
INJECTION, EMULSION INTRAVENOUS CONTINUOUS PRN
Status: DISCONTINUED | OUTPATIENT
Start: 2022-12-20 | End: 2022-12-20

## 2022-12-20 RX ORDER — BUPIVACAINE HYDROCHLORIDE 5 MG/ML
INJECTION, SOLUTION EPIDURAL; INTRACAUDAL AS NEEDED
Status: DISCONTINUED | OUTPATIENT
Start: 2022-12-20 | End: 2022-12-20 | Stop reason: HOSPADM

## 2022-12-20 RX ORDER — LIDOCAINE HYDROCHLORIDE 20 MG/ML
INJECTION, SOLUTION EPIDURAL; INFILTRATION; INTRACAUDAL; PERINEURAL AS NEEDED
Status: DISCONTINUED | OUTPATIENT
Start: 2022-12-20 | End: 2022-12-20 | Stop reason: HOSPADM

## 2022-12-20 RX ORDER — LIDOCAINE HYDROCHLORIDE 10 MG/ML
INJECTION, SOLUTION EPIDURAL; INFILTRATION; INTRACAUDAL; PERINEURAL AS NEEDED
Status: DISCONTINUED | OUTPATIENT
Start: 2022-12-20 | End: 2022-12-20

## 2022-12-20 RX ORDER — SODIUM CHLORIDE 9 MG/ML
INJECTION, SOLUTION INTRAVENOUS CONTINUOUS PRN
Status: DISCONTINUED | OUTPATIENT
Start: 2022-12-20 | End: 2022-12-20

## 2022-12-20 RX ORDER — FENTANYL CITRATE 50 UG/ML
INJECTION, SOLUTION INTRAMUSCULAR; INTRAVENOUS AS NEEDED
Status: DISCONTINUED | OUTPATIENT
Start: 2022-12-20 | End: 2022-12-20

## 2022-12-20 RX ORDER — PROPOFOL 10 MG/ML
INJECTION, EMULSION INTRAVENOUS AS NEEDED
Status: DISCONTINUED | OUTPATIENT
Start: 2022-12-20 | End: 2022-12-20

## 2022-12-20 RX ORDER — GLYCOPYRROLATE 0.2 MG/ML
INJECTION INTRAMUSCULAR; INTRAVENOUS AS NEEDED
Status: DISCONTINUED | OUTPATIENT
Start: 2022-12-20 | End: 2022-12-20

## 2022-12-20 RX ADMIN — CEFTRIAXONE SODIUM 1000 MG: 10 INJECTION, POWDER, FOR SOLUTION INTRAVENOUS at 08:26

## 2022-12-20 RX ADMIN — PROPOFOL 70 MG: 10 INJECTION, EMULSION INTRAVENOUS at 09:40

## 2022-12-20 RX ADMIN — PROPOFOL 100 MCG/KG/MIN: 10 INJECTION, EMULSION INTRAVENOUS at 09:41

## 2022-12-20 RX ADMIN — LIDOCAINE HYDROCHLORIDE 50 MG: 10 INJECTION, SOLUTION EPIDURAL; INFILTRATION; INTRACAUDAL; PERINEURAL at 09:40

## 2022-12-20 RX ADMIN — FENTANYL CITRATE 25 MCG: 50 INJECTION INTRAMUSCULAR; INTRAVENOUS at 09:43

## 2022-12-20 RX ADMIN — GLYCOPYRROLATE 0.1 MG: 0.2 INJECTION, SOLUTION INTRAMUSCULAR; INTRAVENOUS at 09:40

## 2022-12-20 RX ADMIN — SODIUM CHLORIDE: 0.9 INJECTION, SOLUTION INTRAVENOUS at 09:28

## 2022-12-20 NOTE — DISCHARGE INSTR - AVS FIRST PAGE
Today you underwent a prostate biopsy  It is normal to have some blood in your urine and/or stool over the next 48 to 72 hours  You should rest today and tomorrow and hydrate aggressively  The bleeding should clear or improve in this timeframe  If the bleeding worsens, our office should be contacted  However, it is normal to have blood in your semen or rust colored semen for up to a month  This is not cause for concern  If there is any concern for fevers/chills or infection signs or symptoms, our office should be contacted immediately  Please drink copious fluids and rest for the first 48 hours after your procedure  Ice can be applied to the perineal skin which is the skin between your scrotum and anus for comfort and inflammation control  Tylenol or acetaminophen can be used over-the-counter for low level discomfort      Aspirin and Plavix to restart in 48 to 72 hours once bleeding subsides

## 2022-12-20 NOTE — OP NOTE
OPERATIVE REPORT  PATIENT NAME: Sheree Albright    :  1945  MRN: [id number]  Pt Location: [facility] GI ROOM 01    SURGERY DATE: 2022    Surgeon(s) and Role:     * Bridgette Kathleen MD - Primary    Preop Diagnosis:  Elevated prostate specific antigen (PSA) [R97 20]    Post-Op Diagnosis Codes:     * Elevated prostate specific antigen (PSA) [R97 20]    Procedure(s) (LRB):  TRANSPERINEAL MRI FUSION BIOPSY PROSTATE (N/A)    Specimen(s):  ID Type Source Tests Collected by Time Destination   1 : right posterior medial x 2  Tissue Prostate TISSUE EXAM Bridgette Kathleen MD 202240    2 : right posterior lateral x 2  Tissue Prostate TISSUE EXAM Bridgette Kathleen MD 202240    3 : right base x 2  Tissue Prostate TISSUE EXAM Bridgette Kathleen MD 202240    4 : left posterior medial x 2  Tissue Prostate TISSUE EXAM Bridgette Kathleen MD 2022 0940    5 : left posterior lateral x 2  Tissue Prostate TISSUE EXAM Bridgette Kathleen MD 2022 0940    6 : left base x 2  Tissue Prostate TISSUE EXAM Bridgette Kathleen MD 202240    7 : left anterior lateral x 2  Tissue Prostate TISSUE EXAM Bridgette Kathleen MD 202240    8 : left anterior medial x 2  Tissue Prostate TISSUE EXAM Bridgette Kathleen MD 2022 0940    9 : right anterior medial x 2  Tissue Prostate TISSUE EXAM Bridgette Kathleen MD 2022 0940    10 : right anterior lateral x 2  Tissue Prostate TISSUE EXAM Bridgette Kathleen MD 2022 0940    11 : ESTEFANIA - left anterior x 5  Tissue Prostate TISSUE EXAM Bridgette Kathleen MD 202240        Estimated Blood Loss:   Minimal    Drains:  * No LDAs found *    Anesthesia Type:   IV Sedation with Anesthesia    Operative Indications:  Elevated prostate specific antigen (PSA) [R97 20]      Operative Findings:  1    Standard prostate biopsy  2   5 cores from left anterior region of interest  3   25 course total    Complications:   None    Procedure and Technique:    Procedure was transperineal saturation biopsy utilizing the Precision Point perineal access device utilized to map the standard geographic sites of the prostate  Lizz Connell is a 78 y o  male with history of prior negative prostate biopsy, elevated PSA, abnormal MRI  He presents after multiparametric MRI was obtained of the prostate  There were lesions concerning so the patient was scheduled for transperineal fusion style biopsy  He was pretreated with ceftriaxone antibiotics  He was met in the prep and hold area and the procedure along with its risks and benefits were discussed and reviewed  Patient signed an informed consent  Transferred to OR  He was placed in dorsal lithotomy with care to pad all pressure points  His perineum and genitalia were prepped with a ChloraPrep solution  Sterile Iodoband was placed over the perineum above the rectum  Side fire biplanar transrectal ultrasound was performed and measurements of the prostate gland were taken as above  Biplanar transrectal ultrasound was placed in the patient's rectum  With the assistance of the technician, the prior obtained MRI images which had been form added for the abnormal lesions were mapped to the real-time ultrasound  In the midportion of the left and right prostate, a cindy was denoted in the perineal skin  Skin wheal was elevated with 5 cc of 2% lidocaine/0 5% Marcaine plain on either side  The PrecisionPoint device was then introduced into the left perineal subcutaneous tissue  We visualized the tip of the needle  Spinal needle was introduced through the subcutaneous fat and into the pelvic floor muscle where a perineal pudendal block was performed with additional 5 cc of 2% lidocaine/0 5% Marcaine plain  This was repeated on the patient's right side  Utilizing the [place] [employer] access device, the perineum was access via finder needle   Ultrasound guidance was utilized to place the needle into the lesion that was mass at the LEFT anterior (ROI1)  Five specimens taken  We confirmed good position of the needle strikes utilizing the fusion software  On the right side of the prostate, we performed serial biopsies of the right posterior medial peripheral zone, right posterior lateral peripheral zone and moving up the anterior horn to the right anterior lateral and right anteromedial zone  Separate specimen was taken from the right-sided prostate base  2 samples were taken from each geography    The PrecisionPoint device was repositioned into the left perineal stab  The biopsies were undertaken in the same fashion on the left side  Left posterior medial, left posterior lateral, left anterior lateral, left anteromedial and left base  A total of 25 biopsies taken  The transplant rectal ultrasound probe was removed  The Iodoband was retracted  Some gentle pressure was placed on the perineum for approximately 5 minutes  Direct pressure was held for 5 minutes for hemostasis  At the completion of the procedure, the patient was taken out of dorsal lithotomy, extubated and transferred to PACU in good condition  Overall the patient tolerated the procedure and there were no complications  Plan-the patient will return for biopsy pathology review in 2 weeks        Patient Disposition:  PACU         SIGNATURE: Brigitte Decker MD  DATE: December 20, 2022  TIME: 10:09 AM

## 2022-12-20 NOTE — ANESTHESIA PREPROCEDURE EVALUATION
Procedure:  TRANSPERINEAL MRI FUSION BIOPSY PROSTATE (Perineum)    Relevant Problems   CARDIO   (+) Aortic valve stenosis   (+) S/P TAVR (transcatheter aortic valve replacement)      MUSCULOSKELETAL   (+) Rheumatoid arthritis of multiple sites with negative rheumatoid factor (HCC)      NEURO/PSYCH   (+) Cerebrovascular accident ([facility] [address] )   (+) Seizure disorder ([facility] [address] )        Physical Exam    Airway    Mallampati score: II  TM Distance: >3 FB  Neck ROM: full     Dental   No notable dental hx     Cardiovascular  Cardiovascular exam normal    Pulmonary  Pulmonary exam normal     Other Findings     •  Left Ventricle: Left ventricular cavity size is normal  Wall thickness is mildly increased  There is mild concentric hypertrophy  The left ventricular ejection fraction is 65%  Systolic function is normal  Wall motion is normal  Diastolic function is normal for age  •  Aortic Valve: There is an Jay RADU 3 Ultra 29 mm TAVR bioprosthetic valve  The prosthetic valve appears well-seated  There is no evidence of regurgitation  There is no evidence of paravalvular regurgitation  The gradient recorded across the prosthetic aortic valve is within the expected range  The aortic valve mean gradient is 18 0 mmHg  The aortic valve area is 1 59 cm2   •  Mitral Valve: There is mild annular calcification  There is mild regurgitation  Anesthesia Plan  ASA Score- 3     Anesthesia Type- IV sedation with anesthesia with ASA Monitors  Additional Monitors:   Airway Plan:           Plan Factors-Exercise tolerance (METS): >4 METS  Chart reviewed  EKG reviewed  Imaging results reviewed  Existing labs reviewed  Patient summary reviewed  Patient is not a current smoker  Patient not instructed to abstain from smoking on day of procedure  Patient did not smoke on day of surgery  Obstructive sleep apnea risk education given perioperatively      Induction-     Postoperative Plan-     Informed Consent- Anesthetic plan and risks discussed with patient  I personally reviewed this patient with the CRNA  Discussed and agreed on the Anesthesia Plan with the LUCERO Hines

## 2022-12-20 NOTE — ANESTHESIA POSTPROCEDURE EVALUATION
Post-Op Assessment Note    CV Status:  Stable  Pain Score: 0    Pain management: adequate     Mental Status:  Awake   Hydration Status:  Stable   PONV Controlled:  Controlled      Post Op Vitals Reviewed: Yes      Staff: Anesthesiologist, CRNA         No notable events documented      BP      Temp     Pulse     Resp      SpO2

## 2023-01-03 ENCOUNTER — OFFICE VISIT (OUTPATIENT)
Dept: FAMILY MEDICINE CLINIC | Facility: CLINIC | Age: 78
End: 2023-01-03

## 2023-01-03 VITALS
HEART RATE: 72 BPM | HEIGHT: 72 IN | RESPIRATION RATE: 18 BRPM | WEIGHT: 215.6 LBS | BODY MASS INDEX: 29.2 KG/M2 | TEMPERATURE: 97.4 F | SYSTOLIC BLOOD PRESSURE: 124 MMHG | DIASTOLIC BLOOD PRESSURE: 76 MMHG

## 2023-01-03 DIAGNOSIS — G40.909 NONINTRACTABLE EPILEPSY WITHOUT STATUS EPILEPTICUS, UNSPECIFIED EPILEPSY TYPE (HCC): ICD-10-CM

## 2023-01-03 DIAGNOSIS — C61 MALIGNANT NEOPLASM OF PROSTATE (HCC): ICD-10-CM

## 2023-01-03 DIAGNOSIS — M06.89 OTHER SPECIFIED RHEUMATOID ARTHRITIS, MULTIPLE SITES (HCC): ICD-10-CM

## 2023-01-03 DIAGNOSIS — E78.2 MIXED HYPERLIPIDEMIA: Primary | ICD-10-CM

## 2023-01-03 NOTE — PROGRESS NOTES
Name: Tami Martinez      : 1945      MRN: [id number]  Encounter Provider: Korina Buck DO  Encounter Date: 1/3/2023   Encounter department: [address]     1  Mixed hyperlipidemia  -     Lipid panel; Future  -     Comprehensive metabolic panel; Future  Recheck fbw Encouraged regular exercise jaden since [place]   2  Malignant neoplasm of prostate ([facility] )  Pt has followup from bx this week to determine next step     3  Other specified rheumatoid arthritis, multiple sites ([facility] )  Stable and encouraged regular exercise     4  Nonintractable 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, unspecified epilepsy type ([facility] )  Stable on current rx       BMI Counseling: Body mass index is 29 24 kg/m²  The BMI is above normal  Nutrition recommendations include consuming healthier snacks and increasing intake of lean protein  Exercise recommendations include exercising 3-5 times per week  Rationale for BMI follow-up plan is due to patient being overweight or obese  Depression Screening and Follow-up Plan: Patient was screened for depression during today's encounter  They screened negative with a PHQ-2 score of 0  Subjective      HPI   Pt doing ok He had recent prostate bx which did show prostate cancer in certain areas bx He denies any complaints He did very well with valve surgery in the Fall and denies sob He is heading to [facility] for a month and then to [facility] at some point again He does have appt with urology this week for f/u from bx  No hematuria no urinary complaints   Review of Systems   Constitutional: Negative for chills and fever  HENT: Negative  Eyes: Negative for visual disturbance  Respiratory: Negative for cough and shortness of breath  Cardiovascular: Negative for chest pain, palpitations and leg swelling  Gastrointestinal: Negative  Genitourinary: Negative for dysuria, frequency and hematuria  Musculoskeletal: Negative  Neurological: Negative for dizziness, light-headedness and headaches  Psychiatric/Behavioral: Negative for sleep disturbance  The patient is not nervous/anxious  Current Outpatient Medications on File Prior to Visit   Medication Sig   • acetaminophen (TYLENOL) 325 mg tablet Take 2 tablets (650 mg total) by mouth every 4 (four) hours as needed for fever (temperature greater than 101 F)   • Ascorbic Acid (VITAMIN C) 100 MG tablet Take by mouth   • B Complex Vitamins (B COMPLEX 50) TABS Take by mouth   • diclofenac sodium (VOLTAREN) 1 % Place on the skin   • Glucosamine-Chondroit-Vit C-Mn (GLUCOSAMINE 1500 COMPLEX) CAPS Take by mouth   • hydroxychloroquine (PLAQUENIL) 200 mg tablet Take 1 tablet by mouth 2 (two) times a day   • loratadine (CLARITIN) 10 mg tablet Take by mouth   • OXcarbazepine (TRILEPTAL) 150 mg tablet Take 150 mg by mouth 2 (two) times a day   • polyethylene glycol (GLYCOLAX) 17 GM/SCOOP powder TAKE 1 CAPFUL (17GM) BY MOUTH ONCE EVERY DAY AS NEEDED MIXED IN WATER OR JUICE   • simvastatin (ZOCOR) 10 mg tablet Take 1 tablet by mouth daily   • vitamin E, tocopherol, 400 units capsule Take by mouth       Objective     /76   Pulse 72   Temp (!) 97 4 °F (36 3 °C) (Temporal)   Resp 18   Ht 6' (1 829 m)   Wt 97 8 kg (215 lb 9 6 oz)   BMI 29 24 kg/m²     Physical Exam  Vitals reviewed  Constitutional:       General: He is not in acute distress  Appearance: Normal appearance  He is not ill-appearing, toxic-appearing or diaphoretic  HENT:      Head: Normocephalic and atraumatic  Right Ear: External ear normal       Left Ear: External ear normal       Nose: Nose normal       Mouth/Throat:      Mouth: Mucous membranes are dry  Eyes:      General: No scleral icterus  Cardiovascular:      Rate and Rhythm: Normal rate and regular rhythm  Pulses: Normal pulses  Pulmonary:      Effort: Pulmonary effort is normal  No respiratory distress        Breath sounds: Normal breath sounds  No wheezing  Abdominal:      General: Bowel sounds are normal  There is no distension  Palpations: Abdomen is soft  Tenderness: There is no abdominal tenderness  Musculoskeletal:      Cervical back: Normal range of motion and neck supple  No rigidity  Right lower leg: No edema  Left lower leg: No edema  Lymphadenopathy:      Cervical: No cervical adenopathy  Skin:     General: Skin is dry  Coloration: Skin is not jaundiced or pale  Neurological:      General: No focal deficit present  Mental Status: He is alert and oriented to person, place, and time  Mental status is at baseline  Psychiatric:         Mood and Affect: Mood normal          Behavior: Behavior normal          Thought Content:  Thought content normal          Judgment: Judgment normal        Marion Villarreal DO

## 2023-01-06 ENCOUNTER — OFFICE VISIT (OUTPATIENT)
Dept: UROLOGY | Facility: CLINIC | Age: 78
End: 2023-01-06

## 2023-01-06 VITALS
BODY MASS INDEX: 29.12 KG/M2 | HEIGHT: 72 IN | WEIGHT: 215 LBS | HEART RATE: 69 BPM | DIASTOLIC BLOOD PRESSURE: 56 MMHG | SYSTOLIC BLOOD PRESSURE: 116 MMHG

## 2023-01-06 DIAGNOSIS — C61 PROSTATE CANCER (HCC): Primary | ICD-10-CM
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
## 2023-01-10 ENCOUNTER — OFFICE VISIT (OUTPATIENT)
Dept: FAMILY MEDICINE CLINIC | Facility: CLINIC | Age: 78
End: 2023-01-10

## 2023-01-10 DIAGNOSIS — C61 MALIGNANT NEOPLASM OF PROSTATE (HCC): ICD-10-CM

## 2023-01-10 DIAGNOSIS — B02.30 HERPES ZOSTER WITH OPHTHALMIC COMPLICATION, UNSPECIFIED HERPES ZOSTER EYE DISEASE: Primary | ICD-10-CM

## 2023-01-10 RX ORDER — VALACYCLOVIR HYDROCHLORIDE 1 G/1
1000 TABLET, FILM COATED ORAL 2 TIMES DAILY
Qty: 14 TABLET | Refills: 0 | Status: SHIPPED | OUTPATIENT
Start: 2023-01-10 | End: 2023-01-17

## 2023-01-10 RX ORDER — GABAPENTIN 300 MG/1
300 CAPSULE ORAL
Qty: 30 CAPSULE | Refills: 0 | Status: SHIPPED | OUTPATIENT
Start: 2023-01-10 | End: 2023-02-09

## 2023-01-10 RX ORDER — PREDNISONE 20 MG/1
20 TABLET ORAL DAILY
Qty: 5 TABLET | Refills: 0 | Status: SHIPPED | OUTPATIENT
Start: 2023-01-10 | End: 2023-01-16 | Stop reason: SDUPTHER

## 2023-01-10 NOTE — PROGRESS NOTES
Name: Imelda Tinajero      : 1945      MRN: [id number]  Encounter Provider: Mckay Taylor DO  Encounter Date: 1/10/2023   Encounter department: [address] [place] [address] Road     1  Herpes zoster with ophthalmic complication, unspecified herpes zoster eye disease  -     valACYclovir (VALTREX) 1,000 mg tablet; Take 1 tablet (1,000 mg total) by mouth 2 (two) times a day for 7 days  -     predniSONE 20 mg tablet; Take 1 tablet (20 mg total) by mouth daily  -     gabapentin (Neurontin) 300 mg capsule; Take 1 capsule (300 mg total) by mouth daily at bedtime  Call eye doctor today to get appt so they can assess eye to rule out affect from zoster around eye   2  Malignant neoplasm of prostate [facility])  Recent prostate biopsy and has urology followup            Subjective      HPI   Pt came with his wife for her appt Has scabbed area "thought pimple" outer left eyebrow area and awoke with swelling and pain around left eye Sxs for 24 hours or slightly longer Vision ok but eye lid is swollen on left side No drainage Tingling down left side of scalp   Recent prostate biopsy and has urology followup for prostate CA  Review of Systems   Constitutional: Negative for chills and fever  HENT: Positive for facial swelling  Left eye swollen   Eyes: Positive for pain, redness and visual disturbance  Respiratory: Negative for cough and shortness of breath  Cardiovascular: Negative for chest pain  Skin: Positive for rash  Neurological: Negative for dizziness, light-headedness and headaches         Current Outpatient Medications on File Prior to Visit   Medication Sig   • acetaminophen (TYLENOL) 325 mg tablet Take 2 tablets (650 mg total) by mouth every 4 (four) hours as needed for fever (temperature greater than 101 F) (Patient not taking: Reported on 2023)   • Ascorbic Acid (VITAMIN C) 100 MG tablet Take by mouth   • B Complex Vitamins (B COMPLEX 50) TABS Take by mouth   • diclofenac sodium (VOLTAREN) 1 % Place on the skin   • Glucosamine-Chondroit-Vit C-Mn (GLUCOSAMINE 1500 COMPLEX) CAPS Take by mouth   • hydroxychloroquine (PLAQUENIL) 200 mg tablet Take 1 tablet by mouth 2 (two) times a day   • loratadine (CLARITIN) 10 mg tablet Take by mouth   • OXcarbazepine (TRILEPTAL) 150 mg tablet Take 150 mg by mouth 2 (two) times a day   • polyethylene glycol (GLYCOLAX) 17 GM/SCOOP powder TAKE 1 CAPFUL (17GM) BY MOUTH ONCE EVERY DAY AS NEEDED MIXED IN WATER OR JUICE   • simvastatin (ZOCOR) 10 mg tablet Take 1 tablet by mouth daily   • vitamin E, tocopherol, 400 units capsule Take by mouth       Objective     There were no vitals taken for this visit  Physical Exam  Constitutional:       General: He is not in acute distress  Appearance: He is not toxic-appearing  Comments: Noted swelling left eyelid almost swollen shut   HENT:      Head: Normocephalic  Right Ear: External ear normal       Left Ear: External ear normal       Nose: Nose normal    Eyes:      General: No scleral icterus  Left eye: Discharge present  Comments: Redness left eyelid and clear discharge sclera redenned    Lymphadenopathy:      Cervical: No cervical adenopathy  Skin:     Findings: Lesion present  Comments: Scabbed lesion left outer eyebrow and redness swelling left eyelid    Neurological:      General: No focal deficit present  Mental Status: He is alert and oriented to person, place, and time  Mental status is at baseline  Cranial Nerves: No cranial nerve deficit  Comments: ? Ptosis but feel due to swelling Otherwise no deficit or droop noted    Psychiatric:         Mood and Affect: Mood normal          Behavior: Behavior normal          Thought Content:  Thought content normal          Judgment: Judgment normal        Dwight Foss, DO

## 2023-01-16 ENCOUNTER — TELEPHONE (OUTPATIENT)
Dept: FAMILY MEDICINE CLINIC | Facility: CLINIC | Age: 78
End: 2023-01-16

## 2023-01-16 DIAGNOSIS — B02.30 HERPES ZOSTER WITH OPHTHALMIC COMPLICATION, UNSPECIFIED HERPES ZOSTER EYE DISEASE: ICD-10-CM

## 2023-01-16 RX ORDER — PREDNISONE 20 MG/1
20 TABLET ORAL DAILY
Qty: 5 TABLET | Refills: 0 | Status: SHIPPED | OUTPATIENT
Start: 2023-01-16

## 2023-01-16 NOTE — TELEPHONE ENCOUNTER
Make sure he took prednsione last week as prescribed I sent refill  Also make sure he saw the eye doctor as recommended when seen and if recurrent eye sxs should check with them as well

## 2023-01-16 NOTE — TELEPHONE ENCOUNTER
He took the medication and now his left eye is swollen  He used ice, went down a bit  Has one day of valtrex left    Taking gabapentin every other day for the pain

## 2023-01-16 NOTE — TELEPHONE ENCOUNTER
Attempted to reach pt, went right to voicemail I did leave message to call back to go over doctors message

## 2023-01-24 NOTE — PROGRESS NOTES
Exercise Session Details Patient tolerated the procedure well. Patient seen and examined at bedside.  No acute complaints at this time. Pain well controlled. Denies chest pain, shortness of breath, nausea or vomiting.     PE:  Vital Signs Last 24 Hrs  T(C): 36.9 (01-24-23 @ 06:22), Max: 37.3 (01-23-23 @ 10:14)  T(F): 98.4 (01-24-23 @ 06:22), Max: 99.1 (01-23-23 @ 10:14)  HR: 89 (01-24-23 @ 06:22) (72 - 120)  BP: 94/47 (01-24-23 @ 06:22) (94/47 - 115/59)  BP(mean): 60 (01-24-23 @ 06:22) (60 - 78)  RR: 20 (01-24-23 @ 06:22) (12 - 29)  SpO2: 100% (01-24-23 @ 06:22) (97% - 100%)    General: NAD, resting comfortably in bed  RLE:   Dressing/Splint in place C/D/I  EHL/FHL+  SILT L4-S1  DP+    LLE:   Bruising to the thigh   Tender to the thigh on palpation   +EHL/FHL/TA/GSC  Sensation intact distally to the foot  DP+      PT/INR - ( 23 Jan 2023 07:51 )   PT: 12.4 sec;   INR: 1.07 ratio         PTT - ( 23 Jan 2023 07:51 )  PTT:26.2 sec    A/P:  5y10m f s/p R femur flexible nailing POD 1 and L LC2 variant with high rami that extends into the anterior acetabulum   -PT/OT   -NWB BLLE  -Pain Control  -Rest, ice, and elevate the extremities as needed  -FU trauma regarding tertiary survey     Ortho
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
## 2023-03-06 ENCOUNTER — OFFICE VISIT (OUTPATIENT)
Dept: CARDIOLOGY CLINIC | Facility: HOSPITAL | Age: 78
End: 2023-03-06

## 2023-03-06 VITALS
OXYGEN SATURATION: 96 % | HEIGHT: 72 IN | SYSTOLIC BLOOD PRESSURE: 128 MMHG | BODY MASS INDEX: 29.8 KG/M2 | DIASTOLIC BLOOD PRESSURE: 80 MMHG | WEIGHT: 220 LBS | HEART RATE: 77 BPM

## 2023-03-06 DIAGNOSIS — Z95.2 S/P TAVR (TRANSCATHETER AORTIC VALVE REPLACEMENT): ICD-10-CM

## 2023-03-06 DIAGNOSIS — E78.5 DYSLIPIDEMIA: ICD-10-CM

## 2023-03-06 DIAGNOSIS — Z86.79 HISTORY OF AORTIC STENOSIS: Primary | ICD-10-CM

## 2023-03-06 RX ORDER — ASPIRIN 81 MG/1
81 TABLET, CHEWABLE ORAL DAILY
COMMUNITY

## 2023-03-06 RX ORDER — ASPIRIN AND DIPYRIDAMOLE 25; 200 MG/1; MG/1
1 CAPSULE, EXTENDED RELEASE ORAL EVERY 12 HOURS SCHEDULED
COMMUNITY

## 2023-03-15 ENCOUNTER — TELEPHONE (OUTPATIENT)
Dept: FAMILY MEDICINE CLINIC | Facility: CLINIC | Age: 78
End: 2023-03-15

## 2023-03-15 NOTE — TELEPHONE ENCOUNTER
Would he want to speak to the psychologist who comes to [facility] - I think she would be a good resource to help with coping skills and discussing his concerns

## 2023-03-15 NOTE — TELEPHONE ENCOUNTER
LMOM with instructions and to call us back to let us know if he would like to speak to the Psychologist
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
## 2023-03-16 ENCOUNTER — TELEPHONE (OUTPATIENT)
Dept: FAMILY MEDICINE CLINIC | Facility: CLINIC | Age: 78
End: 2023-03-16

## 2023-03-16 NOTE — TELEPHONE ENCOUNTER
PT CALLED REQUESTING AN APPT WITH YOU AS SOON AS POSSIBLE  HE JUST FOUND OUT HE HAS CANCER AND IS HAVING TROUBLE SLEEPING  HE WANTS TO TALK TO YOU  HE WOULD BE AVAILABLE THIS AFTERNOON OR TOMORROW  PLEASE ADVISE

## 2023-03-16 NOTE — TELEPHONE ENCOUNTER
Can offer 11 30 tomorrow BUT he called yesterday I already offered to schedule him with the psychologist who sees patients in [place] which remains a recommendation

## 2023-03-17 DIAGNOSIS — F06.4 ANXIETY DISORDER DUE TO MEDICAL CONDITION: Primary | ICD-10-CM

## 2023-03-17 DIAGNOSIS — C61 PROSTATE CANCER (HCC): ICD-10-CM

## 2023-03-17 DIAGNOSIS — G47.01 INSOMNIA DUE TO MEDICAL CONDITION: ICD-10-CM

## 2023-03-17 NOTE — TELEPHONE ENCOUNTER
Pt called back I stated I could make an appt for pt, but did let him know Dr alonzo recommends he see psychologist  Pt stated " ok I will give the office a call " Wife stated in background that they have the number already   Can you please place referral

## 2023-03-20 ENCOUNTER — OFFICE VISIT (OUTPATIENT)
Dept: FAMILY MEDICINE CLINIC | Facility: CLINIC | Age: 78
End: 2023-03-20

## 2023-03-20 VITALS
WEIGHT: 215.4 LBS | BODY MASS INDEX: 29.17 KG/M2 | HEIGHT: 72 IN | SYSTOLIC BLOOD PRESSURE: 126 MMHG | DIASTOLIC BLOOD PRESSURE: 78 MMHG | RESPIRATION RATE: 18 BRPM | HEART RATE: 78 BPM | TEMPERATURE: 97.6 F

## 2023-03-20 DIAGNOSIS — Z95.2 S/P TAVR (TRANSCATHETER AORTIC VALVE REPLACEMENT): ICD-10-CM

## 2023-03-20 DIAGNOSIS — C61 MALIGNANT NEOPLASM OF PROSTATE (HCC): Primary | ICD-10-CM

## 2023-03-20 DIAGNOSIS — E78.5 DYSLIPIDEMIA: ICD-10-CM

## 2023-03-20 PROBLEM — B02.30 HERPES ZOSTER WITH OPHTHALMIC COMPLICATION: Status: RESOLVED | Noted: 2023-01-10 | Resolved: 2023-03-20

## 2023-03-20 PROBLEM — I35.0 AORTIC VALVE STENOSIS: Status: RESOLVED | Noted: 2021-10-27 | Resolved: 2023-03-20

## 2023-03-20 RX ORDER — VIT C/B6/B5/MAGNESIUM/HERB 173 50-5-6-5MG
CAPSULE ORAL
COMMUNITY

## 2023-03-20 RX ORDER — CALCIUM CITRATE/VITAMIN D3 315MG-6.25
TABLET ORAL
COMMUNITY

## 2023-03-20 RX ORDER — PSYLLIUM HUSK 0.4 G
1000 CAPSULE ORAL DAILY
COMMUNITY

## 2023-03-20 NOTE — PROGRESS NOTES
Name: Perlita Padilla      : 1945      MRN: [id number]  Encounter Provider: Chikis Wood DO  Encounter Date: 3/20/2023   Encounter department: [address] [place] [address] Road     1  Malignant neoplasm of prostate ([facility] [address] )  Pt is on surveillance thru Urology and has appt/lab for July  He has started supplements, walking and healthier diet as well  He will try Melatonin at HS for sleep at this time   Avoid caffeine as well     2  S/P TAVR (transcatheter aortic valve replacement)  Stable and just had Cardiology followup    3  Dyslipidemia  Has rx for fbw in July   He is walking and changing to healthier diet as well       Depression Screening and Follow-up Plan: Patient was screened for depression during today's encounter  They screened negative with a PHQ-2 score of 2  Subjective      HPI   Pt is concerned about recent prostate cancer dx He is recommended to have psa monitoring every 6 months and will have lab in July He recently started supplements as well as walking to try to boost immune system and increase antioxidants No urologic complaints Not sleeping as well and he thinks due to recent dx He will be heading to [facility] for 2 months soon as well and will golf more often there   Review of Systems   Constitutional: Negative for chills and fever  HENT: Negative  Eyes: Negative for visual disturbance  Respiratory: Negative for cough and shortness of breath  Cardiovascular: Negative for chest pain, palpitations and leg swelling  Gastrointestinal: Negative for abdominal distention and abdominal pain  Genitourinary: Negative for difficulty urinating and frequency  Musculoskeletal: Negative  Neurological: Negative for dizziness, light-headedness and headaches  Psychiatric/Behavioral: Positive for sleep disturbance  The patient is not nervous/anxious          Current Outpatient Medications on File Prior to Visit   Medication Sig   • Ascorbic Acid (VITAMIN C) 100 MG tablet Take by mouth   • aspirin 81 mg chewable tablet Chew 81 mg daily   • aspirin-dipyridamole (AGGRENOX)  mg per 12 hr capsule Take 1 capsule by mouth every 12 (twelve) hours   • B Complex Vitamins (B COMPLEX 50) TABS Take by mouth   • Calcium Carbonate (CALCIUM 500 PO) Take by mouth   • Cholecalciferol (Vitamin D-1000 Max St) 25 MCG (1000 UT) tablet Take 1,000 Units by mouth daily   • diclofenac sodium (VOLTAREN) 1 % Place on the skin   • Glucosamine-Chondroit-Vit C-Mn (GLUCOSAMINE 1500 COMPLEX) CAPS Take by mouth   • Green Tea, Rosemarie sinensis, (Green Tea Extract) 150 MG CAPS Take by mouth   • hydroxychloroquine (PLAQUENIL) 200 mg tablet Take 1 tablet by mouth 2 (two) times a day   • Lycopene (LYCOTENE PO) Take by mouth   • OXcarbazepine (TRILEPTAL) 150 mg tablet Take 150 mg by mouth 2 (two) times a day   • polyethylene glycol (GLYCOLAX) 17 GM/SCOOP powder TAKE 1 CAPFUL (17GM) BY MOUTH ONCE EVERY DAY AS NEEDED MIXED IN WATER OR JUICE   • polyethylene glycol-propylene glycol (SYSTANE) 0 4-0 3 % INSTILL 1 DROP IN EACH EYE FOUR TIMES A DAY AS NEEDED   • Pomegranate, [place] gran[place], (POMEGRANATE EXTRACT PO) Take by mouth   • simvastatin (ZOCOR) 10 mg tablet Take 1 tablet by mouth daily   • Turmeric 500 MG CAPS Take by mouth   • vitamin E, tocopherol, 400 units capsule Take by mouth   • [DISCONTINUED] acetaminophen (TYLENOL) 325 mg tablet Take 2 tablets (650 mg total) by mouth every 4 (four) hours as needed for fever (temperature greater than 101 F) (Patient not taking: Reported on 1/6/2023)   • [DISCONTINUED] gabapentin (Neurontin) 300 mg capsule Take 1 capsule (300 mg total) by mouth daily at bedtime (Patient not taking: Reported on 3/20/2023)   • [DISCONTINUED] loratadine (CLARITIN) 10 mg tablet Take by mouth (Patient not taking: Reported on 3/20/2023)   • [DISCONTINUED] predniSONE 20 mg tablet Take 1 tablet (20 mg total) by mouth daily (Patient not taking: Reported on 3/20/2023)   • [DISCONTINUED] valACYclovir (VALTREX) 1,000 mg tablet Take 1 tablet (1,000 mg total) by mouth 2 (two) times a day for 7 days (Patient not taking: Reported on 3/20/2023)       Objective     /78   Pulse 78   Temp 97 6 °F (36 4 °C) (Temporal)   Resp 18   Ht 6' (1 829 m)   Wt 97 7 kg (215 lb 6 4 oz)   BMI 29 21 kg/m²     Physical Exam  Vitals reviewed  Constitutional:       General: He is not in acute distress  Appearance: Normal appearance  He is not ill-appearing, toxic-appearing or diaphoretic  HENT:      Head: Normocephalic and atraumatic  Right Ear: External ear normal       Left Ear: External ear normal       Nose: Nose normal       Mouth/Throat:      Mouth: Mucous membranes are moist    Eyes:      General: No scleral icterus  Extraocular Movements: Extraocular movements intact  Conjunctiva/sclera: Conjunctivae normal       Pupils: Pupils are equal, round, and reactive to light  Cardiovascular:      Rate and Rhythm: Normal rate and regular rhythm  Pulses: Normal pulses  Pulmonary:      Effort: Pulmonary effort is normal  No respiratory distress  Breath sounds: Normal breath sounds  No wheezing  Abdominal:      General: Bowel sounds are normal  There is no distension  Palpations: Abdomen is soft  Tenderness: There is no abdominal tenderness  Musculoskeletal:      Cervical back: Normal range of motion and neck supple  No rigidity  Right lower leg: No edema  Left lower leg: No edema  Lymphadenopathy:      Cervical: No cervical adenopathy  Skin:     General: Skin is dry  Coloration: Skin is not jaundiced or pale  Neurological:      General: No focal deficit present  Mental Status: He is alert and oriented to person, place, and time  Mental status is at baseline  Cranial Nerves: No cranial nerve deficit  Psychiatric:         Mood and Affect: Mood normal          Behavior: Behavior normal          Thought Content:  Thought content normal  Judgment: Judgment normal        Steven Banda, DO

## 2023-03-27 ENCOUNTER — TELEPHONE (OUTPATIENT)
Dept: PAIN MEDICINE | Facility: CLINIC | Age: 78
End: 2023-03-27
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
## 2023-06-13 ENCOUNTER — OFFICE VISIT (OUTPATIENT)
Dept: OTOLARYNGOLOGY | Facility: CLINIC | Age: 78
End: 2023-06-13
Payer: MEDICARE

## 2023-06-13 VITALS
HEIGHT: 72 IN | DIASTOLIC BLOOD PRESSURE: 72 MMHG | WEIGHT: 212.2 LBS | OXYGEN SATURATION: 93 % | SYSTOLIC BLOOD PRESSURE: 124 MMHG | TEMPERATURE: 97.5 F | BODY MASS INDEX: 28.74 KG/M2 | HEART RATE: 74 BPM

## 2023-06-13 DIAGNOSIS — H61.23 BILATERAL IMPACTED CERUMEN: ICD-10-CM

## 2023-06-13 DIAGNOSIS — H90.3 SENSORINEURAL HEARING LOSS (SNHL) OF BOTH EARS: Primary | ICD-10-CM

## 2023-06-13 PROCEDURE — 99212 OFFICE O/P EST SF 10 MIN: CPT | Performed by: OTOLARYNGOLOGY
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
## 2023-06-16 ENCOUNTER — APPOINTMENT (OUTPATIENT)
Dept: LAB | Facility: MEDICAL CENTER | Age: 78
End: 2023-06-16
Payer: MEDICARE

## 2023-06-16 DIAGNOSIS — C61 PROSTATE CANCER (HCC): ICD-10-CM

## 2023-06-16 DIAGNOSIS — E78.2 MIXED HYPERLIPIDEMIA: ICD-10-CM

## 2023-06-16 LAB
ALBUMIN SERPL BCP-MCNC: 3.8 G/DL (ref 3.5–5)
ALP SERPL-CCNC: 61 U/L (ref 46–116)
ALT SERPL W P-5'-P-CCNC: 29 U/L (ref 12–78)
ANION GAP SERPL CALCULATED.3IONS-SCNC: 3 MMOL/L (ref 4–13)
AST SERPL W P-5'-P-CCNC: 28 U/L (ref 5–45)
BILIRUB SERPL-MCNC: 0.62 MG/DL (ref 0.2–1)
BUN SERPL-MCNC: 14 MG/DL (ref 5–25)
CALCIUM SERPL-MCNC: 9 MG/DL (ref 8.3–10.1)
CHLORIDE SERPL-SCNC: 114 MMOL/L (ref 96–108)
CHOLEST SERPL-MCNC: 155 MG/DL
CO2 SERPL-SCNC: 25 MMOL/L (ref 21–32)
CREAT SERPL-MCNC: 0.77 MG/DL (ref 0.6–1.3)
GFR SERPL CREATININE-BSD FRML MDRD: 88 ML/MIN/1.73SQ M
GLUCOSE P FAST SERPL-MCNC: 101 MG/DL (ref 65–99)
HDLC SERPL-MCNC: 57 MG/DL
LDLC SERPL CALC-MCNC: 70 MG/DL (ref 0–100)
NONHDLC SERPL-MCNC: 98 MG/DL
POTASSIUM SERPL-SCNC: 4 MMOL/L (ref 3.5–5.3)
PROT SERPL-MCNC: 6.9 G/DL (ref 6.4–8.4)
PSA SERPL-MCNC: 11.57 NG/ML (ref 0–4)
SODIUM SERPL-SCNC: 142 MMOL/L (ref 135–147)
TRIGL SERPL-MCNC: 140 MG/DL

## 2023-06-16 PROCEDURE — 84153 ASSAY OF PSA TOTAL: CPT

## 2023-06-16 PROCEDURE — 80053 COMPREHEN METABOLIC PANEL: CPT

## 2023-06-16 PROCEDURE — 36415 COLL VENOUS BLD VENIPUNCTURE: CPT

## 2023-06-16 PROCEDURE — 80061 LIPID PANEL: CPT
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
## 2023-06-21 ENCOUNTER — OFFICE VISIT (OUTPATIENT)
Dept: UROLOGY | Facility: CLINIC | Age: 78
End: 2023-06-21
Payer: MEDICARE

## 2023-06-21 VITALS
BODY MASS INDEX: 29.12 KG/M2 | HEART RATE: 64 BPM | DIASTOLIC BLOOD PRESSURE: 62 MMHG | SYSTOLIC BLOOD PRESSURE: 108 MMHG | HEIGHT: 72 IN | WEIGHT: 215 LBS

## 2023-06-21 DIAGNOSIS — C61 PROSTATE CANCER (HCC): Primary | ICD-10-CM

## 2023-06-21 PROCEDURE — 99213 OFFICE O/P EST LOW 20 MIN: CPT | Performed by: UROLOGY

## 2023-06-21 NOTE — PATIENT INSTRUCTIONS
Have MRI of the prostate done when it gets scheduled, and have the PSA done in about a month from your last PSA  I will let you know the results  Check the portal   If the MRI shows stage migration or the PSA is still elevated, we will plan on an MR fusion transperineal prostate biopsy
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
## 2023-06-27 DIAGNOSIS — I35.0 AORTIC VALVE STENOSIS, ETIOLOGY OF CARDIAC VALVE DISEASE UNSPECIFIED: ICD-10-CM

## 2023-06-27 DIAGNOSIS — Z95.2 S/P TAVR (TRANSCATHETER AORTIC VALVE REPLACEMENT): Primary | ICD-10-CM

## 2023-06-28 ENCOUNTER — TELEPHONE (OUTPATIENT)
Dept: CARDIAC SURGERY | Facility: CLINIC | Age: 78
End: 2023-06-28

## 2023-06-28 NOTE — TELEPHONE ENCOUNTER
Called patient to let him know I was able to reschedule for an earlier time on 7/27 at [address] if this didn't work to give me a call back and we could get it rescheduled

## 2023-07-03 ENCOUNTER — APPOINTMENT (OUTPATIENT)
Dept: LAB | Facility: MEDICAL CENTER | Age: 78
End: 2023-07-03
Payer: MEDICARE

## 2023-07-03 DIAGNOSIS — C61 PROSTATE CANCER (HCC): ICD-10-CM

## 2023-07-03 DIAGNOSIS — Z95.2 S/P TAVR (TRANSCATHETER AORTIC VALVE REPLACEMENT): ICD-10-CM

## 2023-07-03 DIAGNOSIS — I35.0 AORTIC VALVE STENOSIS, ETIOLOGY OF CARDIAC VALVE DISEASE UNSPECIFIED: ICD-10-CM

## 2023-07-03 LAB
ANION GAP SERPL CALCULATED.3IONS-SCNC: 3 MMOL/L
BASOPHILS # BLD AUTO: 0.01 THOUSANDS/ÂΜL (ref 0–0.1)
BASOPHILS NFR BLD AUTO: 0 % (ref 0–1)
BUN SERPL-MCNC: 25 MG/DL (ref 5–25)
CALCIUM SERPL-MCNC: 9.5 MG/DL (ref 8.3–10.1)
CHLORIDE SERPL-SCNC: 110 MMOL/L (ref 96–108)
CO2 SERPL-SCNC: 27 MMOL/L (ref 21–32)
CREAT SERPL-MCNC: 0.84 MG/DL (ref 0.6–1.3)
EOSINOPHIL # BLD AUTO: 0 THOUSAND/ÂΜL (ref 0–0.61)
EOSINOPHIL NFR BLD AUTO: 0 % (ref 0–6)
ERYTHROCYTE [DISTWIDTH] IN BLOOD BY AUTOMATED COUNT: 13.2 % (ref 11.6–15.1)
GFR SERPL CREATININE-BSD FRML MDRD: 85 ML/MIN/1.73SQ M
GLUCOSE P FAST SERPL-MCNC: 83 MG/DL (ref 65–99)
HCT VFR BLD AUTO: 45.1 % (ref 36.5–49.3)
HGB BLD-MCNC: 15.2 G/DL (ref 12–17)
IMM GRANULOCYTES # BLD AUTO: 0.03 THOUSAND/UL (ref 0–0.2)
IMM GRANULOCYTES NFR BLD AUTO: 1 % (ref 0–2)
LYMPHOCYTES # BLD AUTO: 1.84 THOUSANDS/ÂΜL (ref 0.6–4.47)
LYMPHOCYTES NFR BLD AUTO: 28 % (ref 14–44)
MCH RBC QN AUTO: 36 PG (ref 26.8–34.3)
MCHC RBC AUTO-ENTMCNC: 33.7 G/DL (ref 31.4–37.4)
MCV RBC AUTO: 107 FL (ref 82–98)
MONOCYTES # BLD AUTO: 0.64 THOUSAND/ÂΜL (ref 0.17–1.22)
MONOCYTES NFR BLD AUTO: 10 % (ref 4–12)
NEUTROPHILS # BLD AUTO: 3.97 THOUSANDS/ÂΜL (ref 1.85–7.62)
NEUTS SEG NFR BLD AUTO: 61 % (ref 43–75)
NRBC BLD AUTO-RTO: 0 /100 WBCS
PLATELET # BLD AUTO: 171 THOUSANDS/UL (ref 149–390)
PMV BLD AUTO: 11.2 FL (ref 8.9–12.7)
POTASSIUM SERPL-SCNC: 3.7 MMOL/L (ref 3.5–5.3)
PSA SERPL-MCNC: 10.29 NG/ML (ref 0–4)
RBC # BLD AUTO: 4.22 MILLION/UL (ref 3.88–5.62)
SODIUM SERPL-SCNC: 140 MMOL/L (ref 135–147)
WBC # BLD AUTO: 6.49 THOUSAND/UL (ref 4.31–10.16)

## 2023-07-03 PROCEDURE — 36415 COLL VENOUS BLD VENIPUNCTURE: CPT

## 2023-07-03 PROCEDURE — 84153 ASSAY OF PSA TOTAL: CPT

## 2023-07-03 PROCEDURE — 80048 BASIC METABOLIC PNL TOTAL CA: CPT

## 2023-07-03 PROCEDURE — 85025 COMPLETE CBC W/AUTO DIFF WBC: CPT

## 2023-07-05 ENCOUNTER — HOSPITAL ENCOUNTER (OUTPATIENT)
Facility: MEDICAL CENTER | Age: 78
Discharge: HOME | End: 2023-07-05
Attending: UROLOGY
Payer: MEDICARE

## 2023-07-05 DIAGNOSIS — C61 PROSTATE CANCER (HCC): ICD-10-CM

## 2023-07-05 PROCEDURE — G1004 CDSM NDSC: HCPCS

## 2023-07-05 PROCEDURE — A9585 GADOBUTROL INJECTION: HCPCS | Performed by: UROLOGY

## 2023-07-05 PROCEDURE — 72197 MRI PELVIS W/O & W/DYE: CPT

## 2023-07-05 PROCEDURE — 76377 3D RENDER W/INTRP POSTPROCES: CPT

## 2023-07-05 RX ADMIN — GADOBUTROL 10 ML: 604.72 INJECTION INTRAVENOUS at 12:23

## 2023-07-11 ENCOUNTER — OFFICE VISIT (OUTPATIENT)
Dept: FAMILY MEDICINE CLINIC | Facility: CLINIC | Age: 78
End: 2023-07-11
Payer: MEDICARE

## 2023-07-11 VITALS
TEMPERATURE: 98 F | RESPIRATION RATE: 18 BRPM | DIASTOLIC BLOOD PRESSURE: 78 MMHG | BODY MASS INDEX: 28.53 KG/M2 | SYSTOLIC BLOOD PRESSURE: 122 MMHG | WEIGHT: 210.6 LBS | HEIGHT: 72 IN | HEART RATE: 70 BPM

## 2023-07-11 DIAGNOSIS — Z00.00 MEDICARE ANNUAL WELLNESS VISIT, SUBSEQUENT: Primary | ICD-10-CM

## 2023-07-11 PROCEDURE — G0439 PPPS, SUBSEQ VISIT: HCPCS | Performed by: INTERNAL MEDICINE
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
## 2023-07-11 NOTE — PATIENT INSTRUCTIONS
Medicare Preventive Visit Patient Instructions  Thank you for completing your Welcome to Medicare Visit or Medicare Annual Wellness Visit today. Your next wellness visit will be due in one year (7/11/2024). The screening/preventive services that you may require over the next 5-10 years are detailed below. Some tests may not apply to you based off risk factors and/or age. Screening tests ordered at today's visit but not completed yet may show as past due. Also, please note that scanned in results may not display below. Preventive Screenings:  Service Recommendations Previous Testing/Comments   Colorectal Cancer Screening  · Colonoscopy    · Fecal Occult Blood Test (FOBT)/Fecal Immunochemical Test (FIT)  · Fecal DNA/Cologuard Test  · Flexible Sigmoidoscopy Age: 43-73 years old   Colonoscopy: every 10 years (May be performed more frequently if at higher risk)  OR  FOBT/FIT: every 1 year  OR  Cologuard: every 3 years  OR  Sigmoidoscopy: every 5 years  Screening may be recommended earlier than age 39 if at higher risk for colorectal cancer. Also, an individualized decision between you and your healthcare provider will decide whether screening between the ages of 77-80 would be appropriate.  Colonoscopy: Not on file  FOBT/FIT: Not on file  Cologuard: 01/13/2021  Sigmoidoscopy: Not on file          Prostate Cancer Screening Individualized decision between patient and health care provider in men between ages of 53-66   Medicare will cover every 12 months beginning on the day after your 50th birthday PSA: 10.29 ng/mL     History Prostate Cancer  Screening Not Indicated     Hepatitis C Screening Once for adults born between 1945 and 1965  More frequently in patients at high risk for Hepatitis C Hep C Antibody: 08/27/2014    Screening Current   Diabetes Screening 1-2 times per year if you're at risk for diabetes or have pre-diabetes Fasting glucose: 83 mg/dL (7/3/2023)  A1C: No results in last 5 years (No results in last 5 years)  Screening Current   Cholesterol Screening Once every 5 years if you don't have a lipid disorder. May order more often based on risk factors. Lipid panel: 06/16/2023  Screening Not Indicated  History Lipid Disorder      Other Preventive Screenings Covered by Medicare:  1. Abdominal Aortic Aneurysm (AAA) Screening: covered once if your at risk. You're considered to be at risk if you have a family history of AAA or a male between the age of 70-76 who smoking at least 100 cigarettes in your lifetime. 2. Lung Cancer Screening: covers low dose CT scan once per year if you meet all of the following conditions: (1) Age 48-67; (2) No signs or symptoms of lung cancer; (3) Current smoker or have quit smoking within the last 15 years; (4) You have a tobacco smoking history of at least 20 pack years (packs per day x number of years you smoked); (5) You get a written order from a healthcare provider. 3. Glaucoma Screening: covered annually if you're considered high risk: (1) You have diabetes OR (2) Family history of glaucoma OR (3)  aged 48 and older OR (3)  American aged 72 and older  3. Osteoporosis Screening: covered every 2 years if you meet one of the following conditions: (1) Have a vertebral abnormality; (2) On glucocorticoid therapy for more than 3 months; (3) Have primary hyperparathyroidism; (4) On osteoporosis medications and need to assess response to drug therapy. 5. HIV Screening: covered annually if you're between the age of 14-79. Also covered annually if you are younger than 13 and older than 72 with risk factors for HIV infection. For pregnant patients, it is covered up to 3 times per pregnancy.     Immunizations:  Immunization Recommendations   Influenza Vaccine Annual influenza vaccination during flu season is recommended for all persons aged >= 6 months who do not have contraindications   Pneumococcal Vaccine   * Pneumococcal conjugate vaccine = PCV13 (Prevnar 13), PCV15 (Vaxneuvance), PCV20 (Prevnar 20)  * Pneumococcal polysaccharide vaccine = PPSV23 (Pneumovax) Adults 2364 years old: 1-3 doses may be recommended based on certain risk factors  Adults 72 years old: 1-2 doses may be recommended based off what pneumonia vaccine you previously received   Hepatitis B Vaccine 3 dose series if at intermediate or high risk (ex: diabetes, end stage renal disease, liver disease)   Tetanus (Td) Vaccine - COST NOT COVERED BY MEDICARE PART B Following completion of primary series, a booster dose should be given every 10 years to maintain immunity against tetanus. Td may also be given as tetanus wound prophylaxis. Tdap Vaccine - COST NOT COVERED BY MEDICARE PART B Recommended at least once for all adults. For pregnant patients, recommended with each pregnancy. Shingles Vaccine (Shingrix) - COST NOT COVERED BY MEDICARE PART B  2 shot series recommended in those aged 48 and above     Health Maintenance Due:      Topic Date Due   • Colorectal Cancer Screening  01/14/2021   • Hepatitis C Screening  Completed     Immunizations Due:      Topic Date Due   • Pneumococcal Vaccine: 65+ Years (1 - PCV) 06/10/1951   • COVID-19 Vaccine (4 - Moderna series) 12/21/2021   • Influenza Vaccine (1) 09/01/2023     Advance Directives   What are advance directives? Advance directives are legal documents that state your wishes and plans for medical care. These plans are made ahead of time in case you lose your ability to make decisions for yourself. Advance directives can apply to any medical decision, such as the treatments you want, and if you want to donate organs. What are the types of advance directives? There are many types of advance directives, and each state has rules about how to use them. You may choose a combination of any of the following:  · Living will: This is a written record of the treatment you want.  You can also choose which treatments you do not want, which to limit, and which to stop at a certain time. This includes surgery, medicine, IV fluid, and tube feedings. · Durable power of  for healthcare [facility]): This is a written record that states who you want to make healthcare choices for you when you are unable to make them for yourself. This person, called a proxy, is usually a family member or a friend. You may choose more than 1 proxy. · Do not resuscitate (DNR) order:  A DNR order is used in case your heart stops beating or you stop breathing. It is a request not to have certain forms of treatment, such as CPR. A DNR order may be included in other types of advance directives. · Medical directive: This covers the care that you want if you are in a coma, near death, or unable to make decisions for yourself. You can list the treatments you want for each condition. Treatment may include pain medicine, surgery, blood transfusions, dialysis, IV or tube feedings, and a ventilator (breathing machine). · Values history: This document has questions about your views, beliefs, and how you feel and think about life. This information can help others choose the care that you would choose. Why are advance directives important? An advance directive helps you control your care. Although spoken wishes may be used, it is better to have your wishes written down. Spoken wishes can be misunderstood, or not followed. Treatments may be given even if you do not want them. An advance directive may make it easier for your family to make difficult choices about your care. Weight Management   Why it is important to manage your weight:  Being overweight increases your risk of health conditions such as heart disease, high blood pressure, type 2 diabetes, and certain types of cancer. It can also increase your risk for osteoarthritis, sleep apnea, and other respiratory problems. Aim for a slow, steady weight loss. Even a small amount of weight loss can lower your risk of health problems.   How to lose weight safely:  A safe and healthy way to lose weight is to eat fewer calories and get regular exercise. You can lose up about 1 pound a week by decreasing the number of calories you eat by 500 calories each day. Healthy meal plan for weight management:  A healthy meal plan includes a variety of foods, contains fewer calories, and helps you stay healthy. A healthy meal plan includes the following:  · Eat whole-grain foods more often. A healthy meal plan should contain fiber. Fiber is the part of grains, fruits, and vegetables that is not broken down by your body. Whole-grain foods are healthy and provide extra fiber in your diet. Some examples of whole-grain foods are whole-wheat breads and pastas, oatmeal, brown rice, and bulgur. · Eat a variety of vegetables every day. Include dark, leafy greens such as spinach, kale, luis alfredo greens, and mustard greens. Eat yellow and orange vegetables such as carrots, sweet potatoes, and winter squash. · Eat a variety of fruits every day. Choose fresh or canned fruit (canned in its own juice or light syrup) instead of juice. Fruit juice has very little or no fiber. · Eat low-fat dairy foods. Drink fat-free (skim) milk or 1% milk. Eat fat-free yogurt and low-fat cottage cheese. Try low-fat cheeses such as mozzarella and other reduced-fat cheeses. · Choose meat and other protein foods that are low in fat. Choose beans or other legumes such as split peas or lentils. Choose fish, skinless poultry (chicken or turkey), or lean cuts of red meat (beef or pork). Before you cook meat or poultry, cut off any visible fat. · Use less fat and oil. Try baking foods instead of frying them. Add less fat, such as margarine, sour cream, regular salad dressing and mayonnaise to foods. Eat fewer high-fat foods. Some examples of high-fat foods include french fries, doughnuts, ice cream, and cakes. · Eat fewer sweets. Limit foods and drinks that are high in sugar.  This includes candy, cookies, regular soda, and sweetened drinks. Exercise:  Exercise at least 30 minutes per day on most days of the week. Some examples of exercise include walking, biking, dancing, and swimming. You can also fit in more physical activity by taking the stairs instead of the elevator or parking farther away from stores. Ask your healthcare provider about the best exercise plan for you. © Copyright [employer] 2018 Information is for End User's use only and may not be sold, redistributed or otherwise used for commercial purposes.  All illustrations and images included in CareNotes® are the copyrighted property of A.D.A.M., Inc. or  [address]

## 2023-07-12 ENCOUNTER — TELEPHONE (OUTPATIENT)
Dept: UROLOGY | Facility: MEDICAL CENTER | Age: 78
End: 2023-07-12

## 2023-07-12 NOTE — TELEPHONE ENCOUNTER
Significant Findings  PT managed by  [facility]  Radiology Test Results -   Radiology Calling with report update:   MRI prostate     Please review imaging

## 2023-07-13 DIAGNOSIS — R97.20 ELEVATED PSA: Primary | ICD-10-CM

## 2023-07-13 NOTE — TELEPHONE ENCOUNTER
Please let the patient know that I have reviewed everything and his MRI is inconclusive. There is no definite cancer and his PSA has come down a little bit. Given that he has had his valve replaced etc. I do not wish to biopsy at this time. Follow-up with me in 6 months with another PSA.

## 2023-07-13 NOTE — TELEPHONE ENCOUNTER
Called and spoke with patient to review Dr. Elva Barone note regarding mpMRI. Patient agreeable to 6 month follow up with PSA prior- he requested late November/early December visit, as [place]. Patient scheduled 11/29/23 @ 0845 in [facility] and will have PSA completed prior to visit.

## 2023-07-20 ENCOUNTER — OFFICE VISIT (OUTPATIENT)
Dept: LAB | Facility: HOSPITAL | Age: 78
End: 2023-07-20
Payer: MEDICARE

## 2023-07-20 DIAGNOSIS — Z95.2 S/P TAVR (TRANSCATHETER AORTIC VALVE REPLACEMENT): ICD-10-CM

## 2023-07-20 DIAGNOSIS — I35.0 AORTIC VALVE STENOSIS, ETIOLOGY OF CARDIAC VALVE DISEASE UNSPECIFIED: ICD-10-CM

## 2023-07-20 LAB
ATRIAL RATE: 68 BPM
P AXIS: 45 DEGREES
PR INTERVAL: 176 MS
QRS AXIS: 0 DEGREES
QRSD INTERVAL: 78 MS
QT INTERVAL: 416 MS
QTC INTERVAL: 442 MS
T WAVE AXIS: 12 DEGREES
VENTRICULAR RATE: 68 BPM

## 2023-07-20 PROCEDURE — 93005 ELECTROCARDIOGRAM TRACING: CPT

## 2023-07-20 PROCEDURE — 93010 ELECTROCARDIOGRAM REPORT: CPT | Performed by: INTERNAL MEDICINE

## 2023-07-27 ENCOUNTER — HOSPITAL ENCOUNTER (OUTPATIENT)
Dept: NON INVASIVE DIAGNOSTICS | Facility: HOSPITAL | Age: 78
Discharge: HOME | End: 2023-07-27
Payer: MEDICARE

## 2023-07-27 ENCOUNTER — APPOINTMENT (OUTPATIENT)
Dept: LAB | Facility: HOSPITAL | Age: 78
End: 2023-07-27
Payer: MEDICARE

## 2023-07-27 VITALS
WEIGHT: 210.54 LBS | DIASTOLIC BLOOD PRESSURE: 68 MMHG | HEIGHT: 72 IN | HEART RATE: 63 BPM | BODY MASS INDEX: 28.52 KG/M2 | SYSTOLIC BLOOD PRESSURE: 118 MMHG

## 2023-07-27 DIAGNOSIS — I35.0 AORTIC VALVE STENOSIS, ETIOLOGY OF CARDIAC VALVE DISEASE UNSPECIFIED: ICD-10-CM

## 2023-07-27 DIAGNOSIS — M06.09 RHEUMATOID ARTHRITIS OF MULTIPLE SITES WITH NEGATIVE RHEUMATOID FACTOR (HCC): ICD-10-CM

## 2023-07-27 DIAGNOSIS — Z95.2 S/P TAVR (TRANSCATHETER AORTIC VALVE REPLACEMENT): ICD-10-CM

## 2023-07-27 LAB
ALBUMIN SERPL BCP-MCNC: 4.1 G/DL (ref 3.5–5)
ALP SERPL-CCNC: 59 U/L (ref 34–104)
ALT SERPL W P-5'-P-CCNC: 15 U/L (ref 7–52)
ANA SER QL IA: NEGATIVE
ANION GAP SERPL CALCULATED.3IONS-SCNC: 6 MMOL/L
AORTIC VALVE ANNULUS: 2 CM
AORTIC VALVE MEAN VELOCITY: 16.3 M/S
ASCENDING AORTA: 3.3 CM
AST SERPL W P-5'-P-CCNC: 18 U/L (ref 13–39)
AV AREA BY CONTINUOUS VTI: 1.6 CM2
AV AREA PEAK VELOCITY: 1.7 CM2
AV LVOT MEAN GRADIENT: 2 MMHG
AV LVOT PEAK GRADIENT: 3 MMHG
AV MEAN GRADIENT: 12 MMHG
AV PEAK GRADIENT: 22 MMHG
AV VALVE AREA: 1.64 CM2
AV VELOCITY RATIO: 0.38
BASOPHILS # BLD AUTO: 0.03 THOUSANDS/ÂΜL (ref 0–0.1)
BASOPHILS NFR BLD AUTO: 1 % (ref 0–1)
BILIRUB SERPL-MCNC: 0.68 MG/DL (ref 0.2–1)
BUN SERPL-MCNC: 18 MG/DL (ref 5–25)
CALCIUM SERPL-MCNC: 9.7 MG/DL (ref 8.4–10.2)
CHLORIDE SERPL-SCNC: 106 MMOL/L (ref 96–108)
CO2 SERPL-SCNC: 29 MMOL/L (ref 21–32)
CREAT SERPL-MCNC: 0.9 MG/DL (ref 0.6–1.3)
CRP SERPL QL: 13.4 MG/L
DOP CALC AO PEAK VEL: 2.36 M/S
DOP CALC AO VTI: 49.98 CM
DOP CALC LVOT AREA: 4.15 CM2
DOP CALC LVOT CARDIAC INDEX: 2.25 L/MIN/M2
DOP CALC LVOT CARDIAC OUTPUT: 4.9 L/MIN
DOP CALC LVOT DIAMETER: 2.3 CM
DOP CALC LVOT PEAK VEL VTI: 19.75 CM
DOP CALC LVOT PEAK VEL: 0.9 M/S
DOP CALC LVOT STROKE INDEX: 38.1 ML/M2
DOP CALC LVOT STROKE VOLUME: 82.01
E WAVE DECELERATION TIME: 313 MS
EOSINOPHIL # BLD AUTO: 0.06 THOUSAND/ÂΜL (ref 0–0.61)
EOSINOPHIL NFR BLD AUTO: 2 % (ref 0–6)
ERYTHROCYTE [DISTWIDTH] IN BLOOD BY AUTOMATED COUNT: 12 % (ref 11.6–15.1)
ERYTHROCYTE [SEDIMENTATION RATE] IN BLOOD: 8 MM/HOUR (ref 0–19)
FRACTIONAL SHORTENING: 29 (ref 28–44)
GFR SERPL CREATININE-BSD FRML MDRD: 82 ML/MIN/1.73SQ M
GLUCOSE SERPL-MCNC: 111 MG/DL (ref 65–140)
HCT VFR BLD AUTO: 43.3 % (ref 36.5–49.3)
HGB BLD-MCNC: 14.6 G/DL (ref 12–17)
IMM GRANULOCYTES # BLD AUTO: 0 THOUSAND/UL (ref 0–0.2)
IMM GRANULOCYTES NFR BLD AUTO: 0 % (ref 0–2)
INTERVENTRICULAR SEPTUM IN DIASTOLE (PARASTERNAL SHORT AXIS VIEW): 1.1 CM
INTERVENTRICULAR SEPTUM: 1.1 CM (ref 0.6–1.1)
LAAS-AP4: 24.7 CM2
LEFT ATRIUM SIZE: 4.3 CM
LEFT INTERNAL DIMENSION IN SYSTOLE: 3 CM (ref 2.1–4)
LEFT VENTRICULAR INTERNAL DIMENSION IN DIASTOLE: 4.2 CM (ref 3.5–6)
LEFT VENTRICULAR POSTERIOR WALL IN END DIASTOLE: 0.9 CM
LEFT VENTRICULAR STROKE VOLUME: 46 ML
LVSV (TEICH): 46 ML
LYMPHOCYTES # BLD AUTO: 1.45 THOUSANDS/ÂΜL (ref 0.6–4.47)
LYMPHOCYTES NFR BLD AUTO: 39 % (ref 14–44)
MCH RBC QN AUTO: 35.6 PG (ref 26.8–34.3)
MCHC RBC AUTO-ENTMCNC: 33.7 G/DL (ref 31.4–37.4)
MCV RBC AUTO: 106 FL (ref 82–98)
MONOCYTES # BLD AUTO: 0.39 THOUSAND/ÂΜL (ref 0.17–1.22)
MONOCYTES NFR BLD AUTO: 11 % (ref 4–12)
MV E'TISSUE VEL-LAT: 6 CM/S
MV E'TISSUE VEL-SEP: 7 CM/S
MV PEAK A VEL: 0.79 M/S
MV PEAK E VEL: 79 CM/S
MV STENOSIS PRESSURE HALF TIME: 91 MS
MV VALVE AREA P 1/2 METHOD: 2.42
NEUTROPHILS # BLD AUTO: 1.78 THOUSANDS/ÂΜL (ref 1.85–7.62)
NEUTS SEG NFR BLD AUTO: 47 % (ref 43–75)
NRBC BLD AUTO-RTO: 0 /100 WBCS
PLATELET # BLD AUTO: 162 THOUSANDS/UL (ref 149–390)
PMV BLD AUTO: 9.6 FL (ref 8.9–12.7)
POTASSIUM SERPL-SCNC: 3.6 MMOL/L (ref 3.5–5.3)
PROT SERPL-MCNC: 6.5 G/DL (ref 6.4–8.4)
RA PRESSURE ESTIMATED: 10 MMHG
RBC # BLD AUTO: 4.1 MILLION/UL (ref 3.88–5.62)
RIGHT ATRIUM AREA SYSTOLE A4C: 19.5 CM2
RIGHT VENTRICLE ID DIMENSION: 2.9 CM
SINOTUBULAR JUNCTION: 2.4 CM
SL CV LV EF: 65
SL CV PED ECHO LEFT VENTRICLE DIASTOLIC VOLUME (MOD BIPLANE) 2D: 81 ML
SL CV PED ECHO LEFT VENTRICLE SYSTOLIC VOLUME (MOD BIPLANE) 2D: 34 ML
SODIUM SERPL-SCNC: 141 MMOL/L (ref 135–147)
STJ: 2.4 CM
TRICUSPID ANNULAR PLANE SYSTOLIC EXCURSION: 3 CM
TRICUSPID VALVE PEAK E WAVE VELOCITY: 0.09 M/S
URATE SERPL-MCNC: 4.7 MG/DL (ref 3.5–8.5)
WBC # BLD AUTO: 3.71 THOUSAND/UL (ref 4.31–10.16)

## 2023-07-27 PROCEDURE — 85025 COMPLETE CBC W/AUTO DIFF WBC: CPT

## 2023-07-27 PROCEDURE — 93306 TTE W/DOPPLER COMPLETE: CPT | Performed by: INTERNAL MEDICINE

## 2023-07-27 PROCEDURE — 86038 ANTINUCLEAR ANTIBODIES: CPT

## 2023-07-27 PROCEDURE — 86803 HEPATITIS C AB TEST: CPT

## 2023-07-27 PROCEDURE — 86200 CCP ANTIBODY: CPT

## 2023-07-27 PROCEDURE — 85652 RBC SED RATE AUTOMATED: CPT

## 2023-07-27 PROCEDURE — 86140 C-REACTIVE PROTEIN: CPT

## 2023-07-27 PROCEDURE — 86753 PROTOZOA ANTIBODY NOS: CPT

## 2023-07-27 PROCEDURE — 80053 COMPREHEN METABOLIC PANEL: CPT

## 2023-07-27 PROCEDURE — 93306 TTE W/DOPPLER COMPLETE: CPT

## 2023-07-27 PROCEDURE — 84550 ASSAY OF BLOOD/URIC ACID: CPT

## 2023-07-27 PROCEDURE — 86480 TB TEST CELL IMMUN MEASURE: CPT

## 2023-07-27 PROCEDURE — 86666 EHRLICHIA ANTIBODY: CPT

## 2023-07-27 PROCEDURE — 36415 COLL VENOUS BLD VENIPUNCTURE: CPT

## 2023-07-27 PROCEDURE — 86430 RHEUMATOID FACTOR TEST QUAL: CPT

## 2023-07-28 LAB
CCP AB SER IA-ACNC: 0.8
HCV AB SER QL: NORMAL
RHEUMATOID FACT SER QL LA: NEGATIVE

## 2023-07-31 LAB
GAMMA INTERFERON BACKGROUND BLD IA-ACNC: 0.02 IU/ML
M TB IFN-G BLD-IMP: NEGATIVE
M TB IFN-G CD4+ BCKGRND COR BLD-ACNC: 0 IU/ML
M TB IFN-G CD4+ BCKGRND COR BLD-ACNC: 0.01 IU/ML
MITOGEN IGNF BCKGRD COR BLD-ACNC: 10 IU/ML

## 2023-08-03 LAB
B MICROTI IGG TITR SER: NORMAL {TITER}
B MICROTI IGM TITR SER: NORMAL {TITER}
RESULT/COMMENT: NORMAL

## 2023-08-04 LAB
A PHAGOCYTOPH IGG TITR SER IF: NEGATIVE {TITER}
A PHAGOCYTOPH IGM TITR SER IF: NEGATIVE {TITER}
E CHAFFEENSIS IGG TITR SER IF: NEGATIVE {TITER}
E CHAFFEENSIS IGM TITR SER IF: NEGATIVE {TITER}
RESULT/COMMENT: NORMAL

## 2023-09-09 PROBLEM — Z00.00 MEDICARE ANNUAL WELLNESS VISIT, SUBSEQUENT: Status: RESOLVED | Noted: 2020-12-31 | Resolved: 2023-09-09

## 2023-10-05 DIAGNOSIS — R97.20 ELEVATED PSA: Primary | ICD-10-CM

## 2023-11-20 ENCOUNTER — APPOINTMENT (OUTPATIENT)
Dept: LAB | Facility: MEDICAL CENTER | Age: 78
End: 2023-11-20
Payer: MEDICARE

## 2023-11-20 LAB — PSA SERPL-MCNC: 12.18 NG/ML (ref 0–4)

## 2023-11-20 PROCEDURE — 84153 ASSAY OF PSA TOTAL: CPT

## 2023-11-20 PROCEDURE — 36415 COLL VENOUS BLD VENIPUNCTURE: CPT

## 2023-11-29 ENCOUNTER — OFFICE VISIT (OUTPATIENT)
Dept: UROLOGY | Facility: CLINIC | Age: 78
End: 2023-11-29
Payer: MEDICARE

## 2023-11-29 VITALS
HEART RATE: 66 BPM | BODY MASS INDEX: 27.98 KG/M2 | HEIGHT: 72 IN | SYSTOLIC BLOOD PRESSURE: 124 MMHG | WEIGHT: 206.6 LBS | DIASTOLIC BLOOD PRESSURE: 78 MMHG | OXYGEN SATURATION: 98 %

## 2023-11-29 DIAGNOSIS — C61 PROSTATE CANCER (HCC): Primary | ICD-10-CM

## 2023-11-29 PROCEDURE — 99213 OFFICE O/P EST LOW 20 MIN: CPT
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
## 2023-11-29 NOTE — Clinical Note
Patient has hx of [place] 6 prostate cancer on active surveillance. MRI from July showed no significant change. PSA risen to 12.18. Please schedule MRI fusion biopsy.

## 2023-12-01 ENCOUNTER — TELEPHONE (OUTPATIENT)
Age: 78
End: 2023-12-01

## 2023-12-01 NOTE — TELEPHONE ENCOUNTER
I spoke to the patient and scheduled his procedure for 1/15/2024 at [facility] with Dr. Michelle Mtz    -instructions given verbally and mailed  -patient aware to be NPO, needs a  and use an enema 1 hour prior to leaving the house morning of procedure  -CBC, CMP, Urine C&S and EKG 2 weeks prior

## 2024-01-02 ENCOUNTER — APPOINTMENT (OUTPATIENT)
Dept: LAB | Facility: MEDICAL CENTER | Age: 79
End: 2024-01-02
Payer: MEDICARE

## 2024-01-02 DIAGNOSIS — C61 MALIGNANT NEOPLASM OF PROSTATE (HCC): ICD-10-CM

## 2024-01-02 DIAGNOSIS — R39.89 SUSPECTED UTI: ICD-10-CM

## 2024-01-02 DIAGNOSIS — Z01.812 PRE-OPERATIVE LABORATORY EXAMINATION: ICD-10-CM

## 2024-01-02 LAB
BASOPHILS # BLD AUTO: 0.03 THOUSANDS/ÂΜL (ref 0–0.1)
BASOPHILS NFR BLD AUTO: 1 % (ref 0–1)
EOSINOPHIL # BLD AUTO: 0.09 THOUSAND/ÂΜL (ref 0–0.61)
EOSINOPHIL NFR BLD AUTO: 2 % (ref 0–6)
ERYTHROCYTE [DISTWIDTH] IN BLOOD BY AUTOMATED COUNT: 13 % (ref 11.6–15.1)
HCT VFR BLD AUTO: 44.9 % (ref 36.5–49.3)
HGB BLD-MCNC: 15.1 G/DL (ref 12–17)
IMM GRANULOCYTES # BLD AUTO: 0.01 THOUSAND/UL (ref 0–0.2)
IMM GRANULOCYTES NFR BLD AUTO: 0 % (ref 0–2)
LYMPHOCYTES # BLD AUTO: 1.58 THOUSANDS/ÂΜL (ref 0.6–4.47)
LYMPHOCYTES NFR BLD AUTO: 40 % (ref 14–44)
MCH RBC QN AUTO: 34.6 PG (ref 26.8–34.3)
MCHC RBC AUTO-ENTMCNC: 33.6 G/DL (ref 31.4–37.4)
MCV RBC AUTO: 103 FL (ref 82–98)
MONOCYTES # BLD AUTO: 0.39 THOUSAND/ÂΜL (ref 0.17–1.22)
MONOCYTES NFR BLD AUTO: 10 % (ref 4–12)
NEUTROPHILS # BLD AUTO: 1.87 THOUSANDS/ÂΜL (ref 1.85–7.62)
NEUTS SEG NFR BLD AUTO: 47 % (ref 43–75)
NRBC BLD AUTO-RTO: 0 /100 WBCS
PLATELET # BLD AUTO: 191 THOUSANDS/UL (ref 149–390)
PMV BLD AUTO: 10.4 FL (ref 8.9–12.7)
RBC # BLD AUTO: 4.37 MILLION/UL (ref 3.88–5.62)
WBC # BLD AUTO: 3.97 THOUSAND/UL (ref 4.31–10.16)

## 2024-01-02 PROCEDURE — 85025 COMPLETE CBC W/AUTO DIFF WBC: CPT

## 2024-01-02 PROCEDURE — 80053 COMPREHEN METABOLIC PANEL: CPT

## 2024-01-02 PROCEDURE — 87086 URINE CULTURE/COLONY COUNT: CPT

## 2024-01-02 PROCEDURE — 36415 COLL VENOUS BLD VENIPUNCTURE: CPT

## 2024-01-03 LAB
ALBUMIN SERPL BCP-MCNC: 4.2 G/DL (ref 3.5–5)
ALP SERPL-CCNC: 63 U/L (ref 34–104)
ALT SERPL W P-5'-P-CCNC: 25 U/L (ref 7–52)
ANION GAP SERPL CALCULATED.3IONS-SCNC: 9 MMOL/L
AST SERPL W P-5'-P-CCNC: 33 U/L (ref 13–39)
BACTERIA UR CULT: NORMAL
BILIRUB SERPL-MCNC: 0.64 MG/DL (ref 0.2–1)
BUN SERPL-MCNC: 16 MG/DL (ref 5–25)
CALCIUM SERPL-MCNC: 9.4 MG/DL (ref 8.4–10.2)
CHLORIDE SERPL-SCNC: 106 MMOL/L (ref 96–108)
CO2 SERPL-SCNC: 26 MMOL/L (ref 21–32)
CREAT SERPL-MCNC: 0.78 MG/DL (ref 0.6–1.3)
GFR SERPL CREATININE-BSD FRML MDRD: 87 ML/MIN/1.73SQ M
GLUCOSE P FAST SERPL-MCNC: 96 MG/DL (ref 65–99)
POTASSIUM SERPL-SCNC: 3.9 MMOL/L (ref 3.5–5.3)
PROT SERPL-MCNC: 6.7 G/DL (ref 6.4–8.4)
SODIUM SERPL-SCNC: 141 MMOL/L (ref 135–147)

## 2024-01-04 NOTE — PRE-PROCEDURE INSTRUCTIONS
Pre-Surgery Instructions:   Medication Instructions    Ascorbic Acid (VITAMIN C) 100 MG tablet Stop taking 7 days prior to surgery.    aspirin 81 mg chewable tablet No hold required per latest urology guidelines    aspirin-dipyridamole (AGGRENOX)  mg per 12 hr capsule Instructions provided by MD    B Complex Vitamins (B COMPLEX 50) TABS Stop taking 7 days prior to surgery.    Calcium Carbonate (CALCIUM 500 PO) Stop taking 7 days prior to surgery.    Cholecalciferol (Vitamin D-1000 Max St) 25 MCG (1000 UT) tablet Stop taking 7 days prior to surgery.    diclofenac sodium (VOLTAREN) 1 % Stop taking 7 days prior to surgery.    Glucosamine-Chondroit-Vit C-Mn (GLUCOSAMINE 1500 COMPLEX) CAPS Stop taking 7 days prior to surgery.    hydroxychloroquine (PLAQUENIL) 200 mg tablet Take day of surgery.    OXcarbazepine (TRILEPTAL) 150 mg tablet Take day of surgery.    polyethylene glycol (GLYCOLAX) 17 GM/SCOOP powder Hold day of surgery.    simvastatin (ZOCOR) 10 mg tablet Take day of surgery.    Turmeric 500 MG CAPS Stop taking 7 days prior to surgery.    vitamin E, tocopherol, 400 units capsule Stop taking 7 days prior to surgery.    Medication instructions for day surgery reviewed. Please use only a sip of water to take your instructed medications. Avoid all over the counter vitamins, supplements and NSAIDS for one week prior to surgery per anesthesia guidelines. Tylenol is ok to take as needed.     You will receive a call one business day prior to surgery with an arrival time and hospital directions. If your surgery is scheduled on a Monday, the hospital will be calling you on the Friday prior to your surgery. If you have not heard from anyone by 8pm, please call the hospital supervisor through the hospital  at [phone]. (Rajinder [phone]).    Do not eat or drink anything after midnight the night before your surgery, including candy, mints, lifesavers, or chewing gum. Do not drink alcohol 24hrs before  your surgery. Try not to smoke at least 24hrs before your surgery.       Follow the pre surgery showering instructions as listed in the “My Surgical Experience Booklet” or otherwise provided by your surgeon's office. Do not use a blade to shave the surgical area 1 week before surgery. It is okay to use a clean electric clippers up to 24 hours before surgery. Do not apply any lotions, creams, including makeup, cologne, deodorant, or perfumes after showering on the day of your surgery. Do not use dry shampoo, hair spray, hair gel, or any type of hair products.     Reviewed that fleets enema should be administered prior to morning preop shower. Pt verbalizes understanding.    No contact lenses, eye make-up, or artificial eyelashes. Remove nail polish, including gel polish, and any artificial, gel, or acrylic nails if possible. Remove all jewelry including rings and body piercing jewelry.     Wear causal clothing that is easy to take on and off. Consider your type of surgery.    Keep any valuables, jewelry, piercings at home. Please bring any specially ordered equipment (sling, braces) if indicated.    Arrange for a responsible person to drive you to and from the hospital on the day of your surgery. Visitor Guidelines discussed.     Call the surgeon's office with any new illnesses, exposures, or additional questions prior to surgery.    Please reference your “My Surgical Experience Booklet” for additional information to prepare for your upcoming surgery.

## 2024-01-12 ENCOUNTER — ANESTHESIA EVENT (OUTPATIENT)
Dept: PERIOP | Facility: HOSPITAL | Age: 79
End: 2024-01-12
Payer: MEDICARE

## 2024-01-14 PROCEDURE — NC001 PR NO CHARGE: Performed by: UROLOGY
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
## 2024-01-15 ENCOUNTER — ANESTHESIA (OUTPATIENT)
Dept: PERIOP | Facility: HOSPITAL | Age: 79
End: 2024-01-15
Payer: MEDICARE

## 2024-01-15 ENCOUNTER — HOSPITAL ENCOUNTER (OUTPATIENT)
Facility: HOSPITAL | Age: 79
Setting detail: OUTPATIENT SURGERY
Discharge: HOME | End: 2024-01-15
Attending: UROLOGY | Admitting: UROLOGY
Payer: MEDICARE

## 2024-01-15 VITALS
HEIGHT: 71 IN | DIASTOLIC BLOOD PRESSURE: 74 MMHG | WEIGHT: 204.37 LBS | BODY MASS INDEX: 28.61 KG/M2 | OXYGEN SATURATION: 95 % | TEMPERATURE: 97.1 F | HEART RATE: 54 BPM | RESPIRATION RATE: 14 BRPM | SYSTOLIC BLOOD PRESSURE: 142 MMHG

## 2024-01-15 DIAGNOSIS — C61 MALIGNANT NEOPLASM OF PROSTATE (HCC): ICD-10-CM

## 2024-01-15 PROCEDURE — G0416 PROSTATE BIOPSY, ANY MTHD: HCPCS | Performed by: PATHOLOGY

## 2024-01-15 PROCEDURE — 88344 IMHCHEM/IMCYTCHM EA MLT ANTB: CPT | Performed by: PATHOLOGY

## 2024-01-15 PROCEDURE — 88342 IMHCHEM/IMCYTCHM 1ST ANTB: CPT | Performed by: PATHOLOGY

## 2024-01-15 PROCEDURE — 55700 PR PROSTATE NEEDLE BIOPSY ANY APPROACH: CPT | Performed by: UROLOGY

## 2024-01-15 PROCEDURE — 76942 ECHO GUIDE FOR BIOPSY: CPT | Performed by: UROLOGY

## 2024-01-15 PROCEDURE — 99024 POSTOP FOLLOW-UP VISIT: CPT | Performed by: UROLOGY

## 2024-01-15 PROCEDURE — 88341 IMHCHEM/IMCYTCHM EA ADD ANTB: CPT | Performed by: PATHOLOGY

## 2024-01-15 RX ORDER — LIDOCAINE HYDROCHLORIDE 10 MG/ML
INJECTION, SOLUTION EPIDURAL; INFILTRATION; INTRACAUDAL; PERINEURAL AS NEEDED
Status: DISCONTINUED | OUTPATIENT
Start: 2024-01-15 | End: 2024-01-15 | Stop reason: HOSPADM

## 2024-01-15 RX ORDER — MEPERIDINE HYDROCHLORIDE 25 MG/ML
12.5 INJECTION INTRAMUSCULAR; INTRAVENOUS; SUBCUTANEOUS ONCE AS NEEDED
Status: DISCONTINUED | OUTPATIENT
Start: 2024-01-15 | End: 2024-01-15 | Stop reason: HOSPADM

## 2024-01-15 RX ORDER — OXYCODONE HYDROCHLORIDE AND ACETAMINOPHEN 5; 325 MG/1; MG/1
1 TABLET ORAL EVERY 4 HOURS PRN
Status: DISCONTINUED | OUTPATIENT
Start: 2024-01-15 | End: 2024-01-15 | Stop reason: HOSPADM

## 2024-01-15 RX ORDER — HYDROMORPHONE HCL/PF 1 MG/ML
0.5 SYRINGE (ML) INJECTION
Status: DISCONTINUED | OUTPATIENT
Start: 2024-01-15 | End: 2024-01-15 | Stop reason: HOSPADM

## 2024-01-15 RX ORDER — ONDANSETRON 2 MG/ML
INJECTION INTRAMUSCULAR; INTRAVENOUS AS NEEDED
Status: DISCONTINUED | OUTPATIENT
Start: 2024-01-15 | End: 2024-01-15

## 2024-01-15 RX ORDER — HYDROCODONE BITARTRATE AND ACETAMINOPHEN 5; 325 MG/1; MG/1
TABLET ORAL
Qty: 6 TABLET | Refills: 0 | Status: SHIPPED | OUTPATIENT
Start: 2024-01-15

## 2024-01-15 RX ORDER — FENTANYL CITRATE 50 UG/ML
INJECTION, SOLUTION INTRAMUSCULAR; INTRAVENOUS AS NEEDED
Status: DISCONTINUED | OUTPATIENT
Start: 2024-01-15 | End: 2024-01-15

## 2024-01-15 RX ORDER — ONDANSETRON 2 MG/ML
4 INJECTION INTRAMUSCULAR; INTRAVENOUS ONCE AS NEEDED
Status: DISCONTINUED | OUTPATIENT
Start: 2024-01-15 | End: 2024-01-15 | Stop reason: HOSPADM

## 2024-01-15 RX ORDER — FENTANYL CITRATE/PF 50 MCG/ML
50 SYRINGE (ML) INJECTION
Status: DISCONTINUED | OUTPATIENT
Start: 2024-01-15 | End: 2024-01-15 | Stop reason: HOSPADM

## 2024-01-15 RX ORDER — SODIUM CHLORIDE 9 MG/ML
125 INJECTION, SOLUTION INTRAVENOUS CONTINUOUS
Status: DISCONTINUED | OUTPATIENT
Start: 2024-01-15 | End: 2024-01-15 | Stop reason: HOSPADM

## 2024-01-15 RX ORDER — PROMETHAZINE HYDROCHLORIDE 25 MG/ML
6.25 INJECTION, SOLUTION INTRAMUSCULAR; INTRAVENOUS ONCE AS NEEDED
Status: DISCONTINUED | OUTPATIENT
Start: 2024-01-15 | End: 2024-01-15 | Stop reason: HOSPADM

## 2024-01-15 RX ORDER — CEFAZOLIN SODIUM 2 G/50ML
2000 SOLUTION INTRAVENOUS ONCE
Status: COMPLETED | OUTPATIENT
Start: 2024-01-15 | End: 2024-01-15

## 2024-01-15 RX ORDER — PROPOFOL 10 MG/ML
INJECTION, EMULSION INTRAVENOUS CONTINUOUS PRN
Status: DISCONTINUED | OUTPATIENT
Start: 2024-01-15 | End: 2024-01-15

## 2024-01-15 RX ORDER — PROPOFOL 10 MG/ML
INJECTION, EMULSION INTRAVENOUS AS NEEDED
Status: DISCONTINUED | OUTPATIENT
Start: 2024-01-15 | End: 2024-01-15

## 2024-01-15 RX ADMIN — CEFAZOLIN SODIUM 2000 MG: 2 SOLUTION INTRAVENOUS at 09:40

## 2024-01-15 RX ADMIN — PROPOFOL 50 MG: 10 INJECTION, EMULSION INTRAVENOUS at 09:51

## 2024-01-15 RX ADMIN — ONDANSETRON 4 MG: 2 INJECTION INTRAMUSCULAR; INTRAVENOUS at 09:51

## 2024-01-15 RX ADMIN — SODIUM CHLORIDE 125 ML/HR: 0.9 INJECTION, SOLUTION INTRAVENOUS at 08:33

## 2024-01-15 RX ADMIN — FENTANYL CITRATE 50 MCG: 50 INJECTION INTRAMUSCULAR; INTRAVENOUS at 09:49

## 2024-01-15 RX ADMIN — PROPOFOL 100 MCG/KG/MIN: 10 INJECTION, EMULSION INTRAVENOUS at 09:51

## 2024-01-15 NOTE — DISCHARGE SUMMARY
Discharge Summary - Derrek Rivera 78 y.o. male MRN: [id number]    Admission Date: 1/15/2024     Admitting Diagnosis: Malignant neoplasm of prostate (HCC) [C61]    Procedures Performed: Ultrasound-guided transperineal fusion prostate biopsy    Patient underwent planned outpt surgery and recovered without complication. Discharged in good condition.  Medications were prescribed.  Pt knows to have office follow-up at the appropriate time.

## 2024-01-15 NOTE — OP NOTE
OPERATIVE REPORT  PATIENT NAME: Derrek Rivera    :  1945  MRN: [id number]  Pt Location: [facility] OR ROOM 03    SURGERY DATE: 1/15/2024    Surgeons and Role:     * Humble Birch MD - Primary    Preop Diagnosis:  Malignant neoplasm of prostate (HCC) [C61]    Post-Op Diagnosis Codes:     * Malignant neoplasm of prostate (HCC) [C61]    Procedure(s):  TRANSPERINEAL MRI FUSION BIOPSY PROSTATE    Specimen(s):  ID Type Source Tests Collected by Time Destination   1 : RIGHT ANTERIOR MEDIAL Tissue Prostate TISSUE EXAM Humble Birch MD 1/15/2024 10:03 AM    2 : RIGHT ANTERIOR LATERAL Tissue Prostate TISSUE EXAM Humble Birch MD 1/15/2024 10:05 AM    3 : RIGHT POSTERIOR LATERAL Tissue Prostate TISSUE EXAM Humble Birch MD 1/15/2024 10:07 AM    4 : LEFT ANTERIOR MEDIAL Tissue Prostate TISSUE EXAM Humble Birch MD 1/15/2024 10:09 AM    5 : LEFT ANTERIOR LATERAL Tissue Prostate TISSUE EXAM Humble Birch MD 1/15/2024 10:12 AM    6 : LEFT POSTERIOR LATERAL Tissue Prostate TISSUE EXAM Humble Birch MD 1/15/2024 10:11 AM    7 : LEFT POSTERIOR MEDIAL Tissue Prostate TISSUE EXAM Humble Birch MD 1/15/2024 10:10 AM    8 : LEFT BASE Tissue Prostate TISSUE EXAM Humble Birch MD 1/15/2024 10:13 AM    9 : RIGHT POSTERIOR MEDIAL Tissue Prostate TISSUE EXAM Humble Birch MD 1/15/2024 10:14 AM    10 : RIGHT BASE Tissue Prostate TISSUE EXAM Humble Birch MD 1/15/2024 10:15 AM    11 : REGION OF INTEREST-LEFT MID MEDIAL Tissue Prostate TISSUE EXAM Humble Birch MD 1/15/2024 10:04 AM        Estimated Blood Loss:   Minimal    Drains:  * No LDAs found *    Anesthesia Type:   IV Sedation with Anesthesia    Operative Indications:  Malignant neoplasm of prostate (HCC) [C61]      Operative Findings:  Multiple biopsies as described    Complications:   None    Procedure and Technique:  The patient was brought to the operating room properly identified.  Monitored anesthesia was then administered.  He was placed in lithotomy position.  He was then  prepped in the usual sterile fashion.  Intravenous antibiotic was administered in the holding area.  An appropriate time-out was performed.  The scrotum was taped to the anterior abdominal wall with tape.  The transrectal ultrasound probe with precision point transperineal access system was then placed into the rectum.  The prostate was visualized.  A sweep was then performed to allow fusion with the MRI images.  Local anesthesia consisting of 0.5% Marcaine and 1% xylocaine was then administered to the perineum, subcutaneous tissue and to the praveen prostatic tissue and levator muscle bilaterally.  The Precision  point access needle was then placed into the perineum and transperineal biopsies performed of the apical lesion noted on MRI.  5 biopsies were performed as the lesion was successfully targeted.  Adequate tissue was obtained.  Next, standard transperineal biopsies were performed.  Multiple biopsies were taken of the anterior medial, anterior lateral, posterior medial, transition zone, posterior lateral and base of the prostate bilaterally to ensure the prostate was well biopsied.  Multiple cores were taken to ensure adequate samples were obtained and that saturation biopsies of the prostate were performed.  25 biopsies were performed.  Once all samples were obtained the precision point needle was removed.  Pressure was held on the perineum for approximately 5 minutes.  This scrotal tape removed.  The patient tolerated the procedure well.  Blood loss was minimal.  He was awakened from anesthesia and taken to the recovery room in satisfactory condition.   I was present for the entire procedure.    Patient Disposition:  PACU         SIGNATURE: Humble Birch MD  DATE: January 15, 2024  TIME: 10:28 AM

## 2024-01-15 NOTE — DISCHARGE INSTR - AVS FIRST PAGE
ALL YOUR  PREVIOUS MEDS ARE THE SAME.    NEW MEDS: Hydrocodone if needed for pain    You will see some bruising and spotting of blood on the perineum, that is the skin between the scrotum and rectum.    You can see blood in the semen if you have ejaculation, for up to 8 weeks.  Do not worry if it seem to take a long time to go away.    EXPECT SOME BLOOD IN URINE, BURNING, FREQUENT URINATION.    ACTIVITY:  RESUME FULL ACTIVITY tomorrow.

## 2024-01-15 NOTE — ANESTHESIA PREPROCEDURE EVALUATION
Procedure:  TRANSPERINEAL MRI FUSION BIOPSY PROSTATE (Perineum)    Relevant Problems   /RENAL   (+) Malignant neoplasm of prostate (HCC)      MUSCULOSKELETAL   (+) Rheumatoid arthritis of multiple sites with negative rheumatoid factor (HCC)      NEURO/PSYCH   (+) Cerebrovascular accident (HCC)   (+) Seizure disorder (HCC)      Other   (+) Fuchs' corneal dystrophy   (+) S/P TAVR (transcatheter aortic valve replacement)        Physical Exam    Airway    Mallampati score: II  TM Distance: >3 FB  Neck ROM: full     Dental   No notable dental hx     Cardiovascular  Rhythm: regular, Rate: normal, Cardiovascular exam normal    Pulmonary  Pulmonary exam normal Breath sounds clear to auscultation    Other Findings   •  Left Ventricle: Left ventricular cavity size is normal. Wall thickness is mildly increased. There is mild concentric hypertrophy. The left ventricular ejection fraction is 65%. Systolic function is normal. Wall motion is normal. Diastolic function is normal for age.  •  Aortic Valve: There is an Jay RADU 3 Ultra 29 mm TAVR bioprosthetic valve. The prosthetic valve appears well-seated. There is no evidence of regurgitation. There is no evidence of paravalvular regurgitation. The gradient recorded across the prosthetic aortic valve is within the expected range. The aortic valve mean gradient is 18.0 mmHg. The aortic valve area is 1.59 cm2.  •  Mitral Valve: There is mild annular calcification. There is mild regurgitation.          Anesthesia Plan  ASA Score- 3     Anesthesia Type- IV sedation with anesthesia with ASA Monitors.         Additional Monitors:     Airway Plan:     Comment: GA prn.       Plan Factors-Exercise tolerance (METS): >4 METS.    Chart reviewed. EKG reviewed. Imaging results reviewed. Existing labs reviewed. Patient summary reviewed.    Patient is not a current smoker. Patient not instructed to abstain from smoking on day of procedure. Patient did not smoke on day of  surgery.    Obstructive sleep apnea risk education given perioperatively.        Induction-     Postoperative Plan-     Informed Consent- Anesthetic plan and risks discussed with patient.

## 2024-01-15 NOTE — ANESTHESIA POSTPROCEDURE EVALUATION
"Post-Op Assessment Note    CV Status:  Stable  Pain Score: 2    Pain management: adequate       Mental Status:  Alert and awake   Hydration Status:  Euvolemic and stable   PONV Controlled:  Controlled   Airway Patency:  Patent     Post Op Vitals Reviewed: Yes      Staff: Anesthesiologist               BP      Temp      Pulse     Resp      SpO2      /60   Pulse (!) 50   Temp 98 °F (36.7 °C) (Temporal)   Resp 16   Ht 5' 11\" (1.803 m)   Wt 92.7 kg (204 lb 5.9 oz)   SpO2 99%   BMI 28.50 kg/m²     "

## 2024-01-16 ENCOUNTER — TELEPHONE (OUTPATIENT)
Dept: UROLOGY | Facility: AMBULATORY SURGERY CENTER | Age: 79
End: 2024-01-16

## 2024-01-16 ENCOUNTER — OFFICE VISIT (OUTPATIENT)
Dept: CARDIOLOGY CLINIC | Facility: HOSPITAL | Age: 79
End: 2024-01-16
Payer: MEDICARE

## 2024-01-16 VITALS
BODY MASS INDEX: 29.96 KG/M2 | HEIGHT: 71 IN | SYSTOLIC BLOOD PRESSURE: 118 MMHG | HEART RATE: 63 BPM | WEIGHT: 214 LBS | DIASTOLIC BLOOD PRESSURE: 64 MMHG

## 2024-01-16 DIAGNOSIS — I63.9 CEREBROVASCULAR ACCIDENT (CVA), UNSPECIFIED MECHANISM (HCC): ICD-10-CM

## 2024-01-16 DIAGNOSIS — E78.5 DYSLIPIDEMIA: ICD-10-CM

## 2024-01-16 DIAGNOSIS — Z95.2 S/P TAVR (TRANSCATHETER AORTIC VALVE REPLACEMENT): Primary | ICD-10-CM

## 2024-01-16 PROCEDURE — 99214 OFFICE O/P EST MOD 30 MIN: CPT | Performed by: INTERNAL MEDICINE

## 2024-01-16 NOTE — TELEPHONE ENCOUNTER
Post Op Note    Derrek Rivera is a 78 y.o. male s/p Fuison biopsy performed 1/15/2024.  Derrek Rivera is a patient of Dr. Dr. Lin and is seen at the [place] office.     How would you rate your pain on a scale from 1 to 10, 10 being the worst pain ever? 0  Have your bowel movements been regular? No  Do you have any difficulty urinating? No  Do you have any other questions or concerns that I can address at this time? Patient had scare of night at 2:20 and couldn't urinate got up an hour later urinated some finally at he was able to urine     Patient is scheduled with Dr Lin in [place] on 1/23 @3:00
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
## 2024-01-17 PROCEDURE — 88341 IMHCHEM/IMCYTCHM EA ADD ANTB: CPT | Performed by: PATHOLOGY

## 2024-01-17 PROCEDURE — 88344 IMHCHEM/IMCYTCHM EA MLT ANTB: CPT | Performed by: PATHOLOGY

## 2024-01-17 PROCEDURE — 88342 IMHCHEM/IMCYTCHM 1ST ANTB: CPT | Performed by: PATHOLOGY

## 2024-01-17 PROCEDURE — G0416 PROSTATE BIOPSY, ANY MTHD: HCPCS | Performed by: PATHOLOGY

## 2024-01-18 ENCOUNTER — TELEPHONE (OUTPATIENT)
Age: 79
End: 2024-01-18

## 2024-01-18 NOTE — TELEPHONE ENCOUNTER
Returned call to patient and re-scheduled pathology review appointment with Dr. Lin from 1/23/24 to 1/31/24.

## 2024-01-18 NOTE — TELEPHONE ENCOUNTER
Patient calling in questioning if there is any other date in January that he could come in to speak with  regarding his pathology. Patients wife has a conflicting apt that day. Please call patient if apt time can be moved up or back or if there is any other possible days.     CB: [phone]    Apt info:    1/23/24 at 3pm with YNES

## 2024-01-19 ENCOUNTER — TELEPHONE (OUTPATIENT)
Dept: UROLOGY | Facility: MEDICAL CENTER | Age: 79
End: 2024-01-19

## 2024-01-19 NOTE — TELEPHONE ENCOUNTER
Telephone call, I discussed his prostate biopsy.  Four areas of Johnna 6 cancer.    He has a full discussion with Dr. Lin coming up

## 2024-01-31 ENCOUNTER — OFFICE VISIT (OUTPATIENT)
Dept: UROLOGY | Facility: CLINIC | Age: 79
End: 2024-01-31
Payer: MEDICARE

## 2024-01-31 VITALS
SYSTOLIC BLOOD PRESSURE: 118 MMHG | BODY MASS INDEX: 28.56 KG/M2 | DIASTOLIC BLOOD PRESSURE: 64 MMHG | HEART RATE: 63 BPM | WEIGHT: 204.8 LBS

## 2024-01-31 DIAGNOSIS — C61 PROSTATE CANCER (HCC): Primary | ICD-10-CM

## 2024-01-31 PROCEDURE — 99213 OFFICE O/P EST LOW 20 MIN: CPT | Performed by: UROLOGY
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
## 2024-02-20 ENCOUNTER — TELEPHONE (OUTPATIENT)
Dept: FAMILY MEDICINE CLINIC | Facility: CLINIC | Age: 79
End: 2024-02-20

## 2024-03-01 ENCOUNTER — OFFICE VISIT (OUTPATIENT)
Dept: FAMILY MEDICINE CLINIC | Facility: CLINIC | Age: 79
End: 2024-03-01
Payer: MEDICARE

## 2024-03-01 VITALS
OXYGEN SATURATION: 96 % | DIASTOLIC BLOOD PRESSURE: 70 MMHG | WEIGHT: 200.6 LBS | HEART RATE: 71 BPM | HEIGHT: 71 IN | RESPIRATION RATE: 18 BRPM | TEMPERATURE: 98 F | BODY MASS INDEX: 28.08 KG/M2 | SYSTOLIC BLOOD PRESSURE: 126 MMHG

## 2024-03-01 DIAGNOSIS — M06.09 RHEUMATOID ARTHRITIS OF MULTIPLE SITES WITH NEGATIVE RHEUMATOID FACTOR (HCC): ICD-10-CM

## 2024-03-01 DIAGNOSIS — G40.909 SEIZURE DISORDER (HCC): ICD-10-CM

## 2024-03-01 DIAGNOSIS — F41.9 ANXIETY: ICD-10-CM

## 2024-03-01 DIAGNOSIS — R79.9 ABNORMAL FINDING OF BLOOD CHEMISTRY, UNSPECIFIED: ICD-10-CM

## 2024-03-01 DIAGNOSIS — C61 MALIGNANT NEOPLASM OF PROSTATE (HCC): Primary | ICD-10-CM

## 2024-03-01 DIAGNOSIS — Z95.2 S/P TAVR (TRANSCATHETER AORTIC VALVE REPLACEMENT): ICD-10-CM

## 2024-03-01 PROBLEM — G31.84 MILD NEUROCOGNITIVE DISORDER: Status: ACTIVE | Noted: 2018-01-27

## 2024-03-01 PROBLEM — Z86.010 HISTORY OF COLONIC POLYPS: Status: ACTIVE | Noted: 2018-01-29

## 2024-03-01 PROBLEM — Z86.0100 HISTORY OF COLONIC POLYPS: Status: ACTIVE | Noted: 2018-01-29

## 2024-03-01 PROCEDURE — 99214 OFFICE O/P EST MOD 30 MIN: CPT | Performed by: INTERNAL MEDICINE

## 2024-03-01 PROCEDURE — G2211 COMPLEX E/M VISIT ADD ON: HCPCS | Performed by: INTERNAL MEDICINE

## 2024-03-01 NOTE — PROGRESS NOTES
Name: Derrek Rivera      : 1945      MRN: [id number]  Encounter Provider: Cheri Gibbons DO  Encounter Date: 3/1/2024   Encounter department: [facility]    Assessment & Plan     1. Malignant neoplasm of prostate (HCC)  Pt follows with urology and has 6 month followup He denies any symptoms at this time   He is on a healthier diet which has helped him lose weight and overall feels better     2. S/P TAVR (transcatheter aortic valve replacement)  -     Lipid panel; Future  -     Comprehensive metabolic panel; Future  He is more active and lost about 20 pounds with lifestyle changes recently     3. Seizure disorder (HCC)  -     Comprehensive metabolic panel; Future  No seizure sxs /stable     4. Anxiety  -     Ambulatory referral to Psych Services; Future  Pt would like to talk to a therapist as he is challenged with his wife who does have challenges with drinking    5. 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 Abnormal finding of blood chemistry, unspecified  -     Lipid panel; Future  Rx for fbw Lipid panel        Depression Screening and Follow-up Plan: Patient was screened for depression during today's encounter. They screened negative with a PHQ-2 score of 2.    Rto 4 months     Subjective      HPI  Pt doing ok He is following with Urology but stable at this time and followup in 6 months No urinary sxs or issues reported and he feels better about his dx overall No chest pain or sob He lost 20 pounds with diet changes and feels better because of that No recent illness He is stressed regarding his wife who does continue to drink alcohol almost to the level she had prior to inpt tx years back He has tried to speak with her as have her children but she does not seem to recognize the problem overall which stresses him He is drinking more water and eating healthier which he plans to continue He is  "going to [place] for few months and will be more active there   Review of Systems   Constitutional:  Negative for chills and fever.   HENT: Negative.     Eyes:  Negative for visual disturbance.   Respiratory:  Negative for cough and shortness of breath.    Cardiovascular:  Negative for chest pain, palpitations and leg swelling.   Gastrointestinal:  Negative for abdominal distention and abdominal pain.   Genitourinary: Negative.    Musculoskeletal: Negative.    Neurological:  Negative for dizziness, light-headedness and headaches.   Psychiatric/Behavioral:  Negative for sleep disturbance. The patient is not nervous/anxious.        Current Outpatient Medications on File Prior to Visit   Medication Sig    Ascorbic Acid (VITAMIN C) 100 MG tablet Take by mouth    aspirin 81 mg chewable tablet Chew 81 mg daily    aspirin-dipyridamole (AGGRENOX)  mg per 12 hr capsule Take 1 capsule by mouth every 12 (twelve) hours    B Complex Vitamins (B COMPLEX 50) TABS Take by mouth    Calcium Carbonate (CALCIUM 500 PO) Take by mouth    Cholecalciferol (Vitamin D-1000 Max St) 25 MCG (1000 UT) tablet Take 1,000 Units by mouth daily    diclofenac sodium (VOLTAREN) 1 % Place on the skin    Glucosamine-Chondroit-Vit C-Mn (GLUCOSAMINE 1500 COMPLEX) CAPS Take by mouth    HYDROcodone-acetaminophen (NORCO) 5-325 mg per tablet 1-2 tab every 6 hr if needed for pain    hydroxychloroquine (PLAQUENIL) 200 mg tablet Take 1 tablet by mouth 2 (two) times a day    OXcarbazepine (TRILEPTAL) 150 mg tablet Take 150 mg by mouth 2 (two) times a day    polyethylene glycol (GLYCOLAX) 17 GM/SCOOP powder TAKE 1 CAPFUL (17GM) BY MOUTH ONCE EVERY DAY AS NEEDED MIXED IN WATER OR JUICE    simvastatin (ZOCOR) 10 mg tablet Take 1 tablet by mouth daily    Turmeric 500 MG CAPS Take by mouth    vitamin E, tocopherol, 400 units capsule Take by mouth       Objective     /70   Pulse 71   Temp 98 °F (36.7 °C) (Temporal)   Resp 18   Ht 5' 11\" (1.803 m)   Wt 91 kg " (200 lb 9.6 oz)   SpO2 96%   BMI 27.98 kg/m²     Physical Exam  Vitals and nursing note reviewed.   Constitutional:       General: He is not in acute distress.     Appearance: Normal appearance. He is not ill-appearing, toxic-appearing or diaphoretic.   HENT:      Head: Normocephalic and atraumatic.      Right Ear: External ear normal.      Left Ear: External ear normal.      Nose: Nose normal.      Mouth/Throat:      Mouth: Mucous membranes are moist.   Eyes:      General: No scleral icterus.  Cardiovascular:      Rate and Rhythm: Normal rate and regular rhythm.      Pulses: Normal pulses.   Pulmonary:      Effort: Pulmonary effort is normal. No respiratory distress.      Breath sounds: Normal breath sounds. No wheezing.   Abdominal:      General: Bowel sounds are normal. There is no distension.      Palpations: Abdomen is soft.      Tenderness: There is no abdominal tenderness.   Musculoskeletal:      Cervical back: Normal range of motion and neck supple.      Right lower leg: No edema.      Left lower leg: No edema.   Lymphadenopathy:      Cervical: No cervical adenopathy.   Skin:     General: Skin is warm and dry.      Coloration: Skin is not jaundiced or pale.   Neurological:      General: No focal deficit present.      Mental Status: He is alert and oriented to person, place, and time. Mental status is at baseline.      Cranial Nerves: No cranial nerve deficit.      Sensory: No sensory deficit.   Psychiatric:         Mood and Affect: Mood normal.         Behavior: Behavior normal.         Thought Content: Thought content normal.         Judgment: Judgment normal.       Cheri Gibbons DO

## 2024-03-07 ENCOUNTER — OFFICE VISIT (OUTPATIENT)
Age: 79
End: 2024-03-07
Payer: MEDICARE

## 2024-03-07 DIAGNOSIS — F43.22 ADJUSTMENT DISORDER WITH ANXIOUS MOOD: Primary | ICD-10-CM

## 2024-03-07 PROCEDURE — 90791 PSYCH DIAGNOSTIC EVALUATION: CPT
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
## 2024-03-14 ENCOUNTER — SOCIAL WORK (OUTPATIENT)
Age: 79
End: 2024-03-14
Payer: MEDICARE

## 2024-03-14 DIAGNOSIS — F43.22 ADJUSTMENT DISORDER WITH ANXIOUS MOOD: Primary | ICD-10-CM

## 2024-03-14 PROCEDURE — 90837 PSYTX W PT 60 MINUTES: CPT
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
## 2024-03-21 ENCOUNTER — OFFICE VISIT (OUTPATIENT)
Dept: OTOLARYNGOLOGY | Facility: CLINIC | Age: 79
End: 2024-03-21
Payer: MEDICARE

## 2024-03-21 VITALS
TEMPERATURE: 98.1 F | BODY MASS INDEX: 28.14 KG/M2 | WEIGHT: 201 LBS | DIASTOLIC BLOOD PRESSURE: 78 MMHG | SYSTOLIC BLOOD PRESSURE: 112 MMHG | OXYGEN SATURATION: 97 % | HEART RATE: 67 BPM | HEIGHT: 71 IN

## 2024-03-21 DIAGNOSIS — Z97.4 WEARS HEARING AID IN BOTH EARS: ICD-10-CM

## 2024-03-21 DIAGNOSIS — H61.22 IMPACTED CERUMEN OF LEFT EAR: ICD-10-CM

## 2024-03-21 DIAGNOSIS — H93.8X2 SENSATION OF PLUGGED EAR ON LEFT SIDE: Primary | ICD-10-CM

## 2024-03-21 DIAGNOSIS — H61.893 DRY BOTH EAR CANALS: ICD-10-CM

## 2024-03-21 DIAGNOSIS — H90.3 SENSORINEURAL HEARING LOSS (SNHL) OF BOTH EARS: ICD-10-CM

## 2024-03-21 PROCEDURE — 69210 REMOVE IMPACTED EAR WAX UNI: CPT | Performed by: PHYSICIAN ASSISTANT

## 2024-03-21 PROCEDURE — 99213 OFFICE O/P EST LOW 20 MIN: CPT | Performed by: PHYSICIAN ASSISTANT

## 2024-03-21 NOTE — PROGRESS NOTES
Review of Systems   Constitutional: Negative.    HENT: Negative.     Eyes: Negative.    Respiratory: Negative.     Cardiovascular: Negative.    Gastrointestinal: Negative.    Endocrine: Negative.    Genitourinary: Negative.    Musculoskeletal: Negative.    Skin: Negative.    Allergic/Immunologic: Negative.    Neurological: Negative.    Hematological: Negative.    Psychiatric/Behavioral: Negative.
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
## 2024-03-22 ENCOUNTER — SOCIAL WORK (OUTPATIENT)
Age: 79
End: 2024-03-22
Payer: MEDICARE

## 2024-03-22 DIAGNOSIS — F43.22 ADJUSTMENT DISORDER WITH ANXIOUS MOOD: Primary | ICD-10-CM

## 2024-03-22 PROCEDURE — 90837 PSYTX W PT 60 MINUTES: CPT
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
## 2024-03-28 ENCOUNTER — SOCIAL WORK (OUTPATIENT)
Age: 79
End: 2024-03-28
Payer: MEDICARE

## 2024-03-28 DIAGNOSIS — F43.22 ADJUSTMENT DISORDER WITH ANXIOUS MOOD: Primary | ICD-10-CM

## 2024-03-28 PROCEDURE — 90837 PSYTX W PT 60 MINUTES: CPT

## 2024-06-04 ENCOUNTER — TELEPHONE (OUTPATIENT)
Dept: OTOLARYNGOLOGY | Facility: CLINIC | Age: 79
End: 2024-06-04

## 2024-06-04 NOTE — TELEPHONE ENCOUNTER
Received call from central scheduling while patient on hold.  Patient has appt for 7/15/24 with ENT but would like to get [facility] appt.  His wife noticed that his tonque looks yellow.  No available earlier ENT appt is available in [place].  He is worried about the discoloring of his tonque. Left patient know to call his PCP to get checked and we would let Yumi Tinsley PA-C know about the issue.

## 2024-06-12 ENCOUNTER — DOCUMENTATION (OUTPATIENT)
Age: 79
End: 2024-06-12
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
## 2024-06-13 NOTE — TELEPHONE ENCOUNTER
Pt called in to cancel tomorrow's appt with Dr. Crawley. Pt will keep the one already has with [facility] in [place].    10

## 2024-07-09 ENCOUNTER — APPOINTMENT (OUTPATIENT)
Dept: LAB | Facility: MEDICAL CENTER | Age: 79
End: 2024-07-09
Payer: MEDICARE

## 2024-07-09 DIAGNOSIS — C61 PROSTATE CANCER (HCC): ICD-10-CM

## 2024-07-09 LAB — PSA SERPL-MCNC: 11.27 NG/ML (ref 0–4)

## 2024-07-09 PROCEDURE — 36415 COLL VENOUS BLD VENIPUNCTURE: CPT

## 2024-07-09 PROCEDURE — 84153 ASSAY OF PSA TOTAL: CPT

## 2024-07-15 ENCOUNTER — OFFICE VISIT (OUTPATIENT)
Dept: OTOLARYNGOLOGY | Facility: CLINIC | Age: 79
End: 2024-07-15
Payer: MEDICARE

## 2024-07-15 VITALS
DIASTOLIC BLOOD PRESSURE: 78 MMHG | SYSTOLIC BLOOD PRESSURE: 120 MMHG | BODY MASS INDEX: 28.42 KG/M2 | RESPIRATION RATE: 16 BRPM | WEIGHT: 203 LBS | HEART RATE: 60 BPM | HEIGHT: 71 IN | TEMPERATURE: 98.3 F | OXYGEN SATURATION: 98 %

## 2024-07-15 DIAGNOSIS — H93.8X3 SENSATION OF PLUGGED EAR ON BOTH SIDES: Primary | ICD-10-CM

## 2024-07-15 DIAGNOSIS — H61.23 BILATERAL IMPACTED CERUMEN: ICD-10-CM

## 2024-07-15 DIAGNOSIS — Z97.4 WEARS HEARING AID IN BOTH EARS: ICD-10-CM

## 2024-07-15 PROCEDURE — 99213 OFFICE O/P EST LOW 20 MIN: CPT | Performed by: PHYSICIAN ASSISTANT

## 2024-07-15 PROCEDURE — 69210 REMOVE IMPACTED EAR WAX UNI: CPT | Performed by: PHYSICIAN ASSISTANT

## 2024-07-15 RX ORDER — NICOTINE POLACRILEX 4 MG/1
20 GUM, CHEWING ORAL
COMMUNITY
Start: 2024-03-29
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
## 2024-07-19 ENCOUNTER — OFFICE VISIT (OUTPATIENT)
Dept: FAMILY MEDICINE CLINIC | Facility: CLINIC | Age: 79
End: 2024-07-19
Payer: MEDICARE

## 2024-07-19 VITALS
HEIGHT: 71 IN | OXYGEN SATURATION: 95 % | BODY MASS INDEX: 28.53 KG/M2 | DIASTOLIC BLOOD PRESSURE: 72 MMHG | WEIGHT: 203.8 LBS | TEMPERATURE: 97.7 F | HEART RATE: 65 BPM | RESPIRATION RATE: 18 BRPM | SYSTOLIC BLOOD PRESSURE: 126 MMHG

## 2024-07-19 DIAGNOSIS — Z00.00 MEDICARE ANNUAL WELLNESS VISIT, SUBSEQUENT: Primary | ICD-10-CM

## 2024-07-19 DIAGNOSIS — Z95.2 S/P TAVR (TRANSCATHETER AORTIC VALVE REPLACEMENT): ICD-10-CM

## 2024-07-19 PROCEDURE — G0439 PPPS, SUBSEQ VISIT: HCPCS | Performed by: INTERNAL MEDICINE

## 2024-07-19 NOTE — PROGRESS NOTES
Ambulatory Visit  Name: Derrek Rivera      : 1945      MRN: [id number]  Encounter Provider: Cheri Gibbons DO  Encounter Date: 2024   Encounter department: [facility]    Assessment & Plan   1. Medicare annual wellness visit, subsequent  Pt moving to [place] by    He will setup followup with PCP,urology and Cardiology in [place]  2. S/P TAVR (transcatheter aortic valve replacement)  -     Echo complete w/ contrast if indicated; Future; Expected date: 2024  He could not setup appt with local cardiology until later in year so hopefully pt can get repeat echo prior to leaving and work to establish Cardio followup in [place] He does not have sxs today       Depression Screening and Follow-up Plan: Patient was screened for depression during today's encounter. They screened negative with a PHQ-2 score of 0.      Preventive health issues were discussed with patient, and age appropriate screening tests were ordered as noted in patient's After Visit Summary. Personalized health advice and appropriate referrals for health education or preventive services given if needed, as noted in patient's After Visit Summary.    History of Present Illness     HPI   Pt generally well He is concerned about prostate levels and disease He is seeing urology this month and currently monitoring He will be moving to [place] likely by September No chest pain or sob He will follow with cardiology once he is in [place] as could not get appt prior to leaving - no sxs at this time   Patient Care Team:  Cheri Gibbons DO as PCP - General (Internal Medicine)  MD Rishabh Vincent MD Zachary J Piotrowski, MD Paul Berger, MD (Urology)    Review of Systems   Constitutional:  Negative for chills and fever.   HENT: Negative.     Eyes:  Negative for visual disturbance.   Respiratory:  Negative for cough and shortness of breath.    Cardiovascular:  Negative for chest pain, palpitations and leg swelling.   Gastrointestinal:   Negative for abdominal distention and abdominal pain.   Genitourinary: Negative.    Musculoskeletal: Negative.    Neurological:  Negative for dizziness, light-headedness and headaches.   Psychiatric/Behavioral:  Negative for sleep disturbance. The patient is not nervous/anxious.      Medical History Reviewed by provider this encounter:       Annual Wellness Visit Questionnaire   Derrek is here for his Subsequent Wellness visit. Last Medicare Wellness visit information reviewed, patient interviewed, no change since last AWV.     Health Risk Assessment:   Patient rates overall health as excellent. Patient feels that their physical health rating is same. Patient is very satisfied with their life. Eyesight was rated as same. Hearing was rated as same. Patient feels that their emotional and mental health rating is same. Patients states they are never, rarely angry. Patient states they are never, rarely unusually tired/fatigued. Pain experienced in the last 7 days has been none. Patient states that he has experienced no weight loss or gain in last 6 months.     Depression Screening:   PHQ-2 Score: 0      Fall Risk Screening:   In the past year, patient has experienced: no history of falling in past year      Home Safety:  Patient does not have trouble with stairs inside or outside of their home. Patient has working smoke alarms and has working carbon monoxide detector. Home safety hazards include: none.     Nutrition:   Current diet is Regular.     Medications:   Patient is currently taking over-the-counter supplements. OTC medications include: see medication list. Patient is able to manage medications.     Activities of Daily Living (ADLs)/Instrumental Activities of Daily Living (IADLs):   Walk and transfer into and out of bed and chair?: Yes  Dress and groom yourself?: Yes    Bathe or shower yourself?: Yes    Feed yourself? Yes  Do your laundry/housekeeping?: Yes  Manage your money, pay your bills and track your  expenses?: Yes  Make your own meals?: Yes    Do your own shopping?: Yes    Previous Hospitalizations:   Any hospitalizations or ED visits within the last 12 months?: Yes    How many hospitalizations have you had in the last year?: 1-2    Advance Care Planning:   Living will: Yes    Durable POA for healthcare: Yes    Advanced directive: Yes    End of Life Decisions reviewed with patient: Yes    Provider agrees with end of life decisions: Yes      Cognitive Screening:   Provider or family/friend/caregiver concerned regarding cognition?: No    PREVENTIVE SCREENINGS      Cardiovascular Screening:    General: Screening Current      Diabetes Screening:     General: Screening Current      Colorectal Cancer Screening:     General: Patient Declines      Prostate Cancer Screening:    General: History Prostate Cancer and Screening Not Indicated      Osteoporosis Screening:    General: Patient Declines      Lung Cancer Screening:     General: Screening Not Indicated      Hepatitis C Screening:    General: Screening Current    Screening, Brief Intervention, and Referral to Treatment (SBIRT)    Screening  Typical number of drinks in a day: 1  Typical number of drinks in a week: 10  Interpretation: Low risk drinking behavior.    AUDIT-C Screenin) How often did you have a drink containing alcohol in the past year? never  2) How many drinks did you have on a typical day when you were drinking in the past year? 0  3) How often did you have 6 or more drinks on one occasion in the past year? never    AUDIT-C Score: 0  Interpretation: Score 0-3 (male): Negative screen for alcohol misuse    Single Item Drug Screening:  How often have you used an illegal drug (including marijuana) or a prescription medication for non-medical reasons in the past year? never    Single Item Drug Screen Score: 0  Interpretation: Negative screen for possible drug use disorder    Brief Intervention  Alcohol & drug use screenings were reviewed. No concerns  "regarding substance use disorder identified.     Review of Current Opioid Use    Opioid Risk Tool (ORT) Interpretation: Complete Opioid Risk Tool (ORT)    [place]MP or [place]  reviewed. No red flags were identified    Other Counseling Topics:   Regular weightbearing exercise and calcium and vitamin D intake.     Social Determinants of Health     Financial Resource Strain: Low Risk  (7/11/2023)    Overall Financial Resource Strain (CARDIA)     Difficulty of Paying Living Expenses: Not very hard   Food Insecurity: No Food Insecurity (7/19/2024)    Hunger Vital Sign     Worried About Running Out of Food in the Last Year: Never true     Ran Out of Food in the Last Year: Never true   Transportation Needs: No Transportation Needs (7/19/2024)    PRAPARE - Transportation     Lack of Transportation (Medical): No     Lack of Transportation (Non-Medical): No   Housing Stability: Low Risk  (7/19/2024)    Housing Stability Vital Sign     Unable to Pay for Housing in the Last Year: No     Number of Times Moved in the Last Year: 1     Homeless in the Last Year: No   Utilities: Not At Risk (7/19/2024)    [facility] Utilities     Threatened with loss of utilities: No     No results found.    Objective     /72   Pulse 65   Temp 97.7 °F (36.5 °C) (Temporal)   Resp 18   Ht 5' 11\" (1.803 m)   Wt 92.4 kg (203 lb 12.8 oz)   SpO2 95%   BMI 28.42 kg/m²     Physical Exam  Vitals and nursing note reviewed.   Constitutional:       General: He is not in acute distress.     Appearance: Normal appearance. He is not ill-appearing, toxic-appearing or diaphoretic.   HENT:      Head: Normocephalic and atraumatic.      Right Ear: External ear normal.      Left Ear: External ear normal.      Nose: Nose normal.      Mouth/Throat:      Mouth: Mucous membranes are moist.   Eyes:      General: No scleral icterus.     Extraocular Movements: Extraocular movements intact.      Conjunctiva/sclera: Conjunctivae normal.      Pupils: Pupils are equal, round, " and reactive to light.   Cardiovascular:      Rate and Rhythm: Normal rate and regular rhythm.      Pulses: Normal pulses.      Heart sounds: Normal heart sounds. No murmur heard.  Pulmonary:      Effort: Pulmonary effort is normal. No respiratory distress.      Breath sounds: Normal breath sounds. No wheezing.   Abdominal:      General: Bowel sounds are normal. There is no distension.      Palpations: Abdomen is soft.      Tenderness: There is no abdominal tenderness.   Musculoskeletal:      Cervical back: Normal range of motion and neck supple.      Right lower leg: No edema.      Left lower leg: No edema.   Lymphadenopathy:      Cervical: No cervical adenopathy.   Skin:     General: Skin is warm and dry.      Coloration: Skin is not jaundiced or pale.   Neurological:      General: No focal deficit present.      Mental Status: He is alert and oriented to person, place, and time. Mental status is at baseline.   Psychiatric:         Mood and Affect: Mood normal.         Behavior: Behavior normal.         Thought Content: Thought content normal.         Judgment: Judgment normal.

## 2024-07-19 NOTE — PATIENT INSTRUCTIONS
Medicare Preventive Visit Patient Instructions  Thank you for completing your Welcome to Medicare Visit or Medicare Annual Wellness Visit today. Your next wellness visit will be due in one year (7/20/2025).  The screening/preventive services that you may require over the next 5-10 years are detailed below. Some tests may not apply to you based off risk factors and/or age. Screening tests ordered at today's visit but not completed yet may show as past due. Also, please note that scanned in results may not display below.  Preventive Screenings:  Service Recommendations Previous Testing/Comments   Colorectal Cancer Screening  Colonoscopy    Fecal Occult Blood Test (FOBT)/Fecal Immunochemical Test (FIT)  Fecal DNA/Cologuard Test  Flexible Sigmoidoscopy Age: 45-75 years old   Colonoscopy: every 10 years (May be performed more frequently if at higher risk)  OR  FOBT/FIT: every 1 year  OR  Cologuard: every 3 years  OR  Sigmoidoscopy: every 5 years  Screening may be recommended earlier than age 45 if at higher risk for colorectal cancer. Also, an individualized decision between you and your healthcare provider will decide whether screening between the ages of 76-85 would be appropriate. Colonoscopy: Not on file  FOBT/FIT: Not on file  Cologuard: Not on file  Sigmoidoscopy: Not on file          Prostate Cancer Screening Individualized decision between patient and health care provider in men between ages of 55-69   Medicare will cover every 12 months beginning on the day after your 50th birthday PSA: 11.265 ng/mL     History Prostate Cancer  Screening Not Indicated     Hepatitis C Screening Once for adults born between 1945 and 1965  More frequently in patients at high risk for Hepatitis C Hep C Antibody: 07/27/2023    Screening Current   Diabetes Screening 1-2 times per year if you're at risk for diabetes or have pre-diabetes Fasting glucose: 87 mg/dL (7/9/2024)  A1C: No results in last 5 years (No results in last 5  years)  Screening Current   Cholesterol Screening Once every 5 years if you don't have a lipid disorder. May order more often based on risk factors. Lipid panel: 07/09/2024  Screening Current      Other Preventive Screenings Covered by Medicare:  Abdominal Aortic Aneurysm (AAA) Screening: covered once if your at risk. You're considered to be at risk if you have a family history of AAA or a male between the age of 65-75 who smoking at least 100 cigarettes in your lifetime.  Lung Cancer Screening: covers low dose CT scan once per year if you meet all of the following conditions: (1) Age 55-77; (2) No signs or symptoms of lung cancer; (3) Current smoker or have quit smoking within the last 15 years; (4) You have a tobacco smoking history of at least 20 pack years (packs per day x number of years you smoked); (5) You get a written order from a healthcare provider.  Glaucoma Screening: covered annually if you're considered high risk: (1) You have diabetes OR (2) Family history of glaucoma OR (3)  aged 50 and older OR (4)  American aged 65 and older  Osteoporosis Screening: covered every 2 years if you meet one of the following conditions: (1) Have a vertebral abnormality; (2) On glucocorticoid therapy for more than 3 months; (3) Have primary hyperparathyroidism; (4) On osteoporosis medications and need to assess response to drug therapy.  HIV Screening: covered annually if you're between the age of 15-65. Also covered annually if you are younger than 15 and older than 65 with risk factors for HIV infection. For pregnant patients, it is covered up to 3 times per pregnancy.    Immunizations:  Immunization Recommendations   Influenza Vaccine Annual influenza vaccination during flu season is recommended for all persons aged >= 6 months who do not have contraindications   Pneumococcal Vaccine   * Pneumococcal conjugate vaccine = PCV13 (Prevnar 13), PCV15 (Vaxneuvance), PCV20 (Prevnar 20)  *  Pneumococcal polysaccharide vaccine = PPSV23 (Pneumovax) Adults 19-63 yo with certain risk factors or if 65+ yo  If never received any pneumonia vaccine: recommend Prevnar 20 (PCV20)  Give PCV20 if previously received 1 dose of PCV13 or PPSV23   Hepatitis B Vaccine 3 dose series if at intermediate or high risk (ex: diabetes, end stage renal disease, liver disease)   Respiratory syncytial virus (RSV) Vaccine - COVERED BY MEDICARE PART D  * RSVPreF3 (Arexvy) CDC recommends that adults 60 years of age and older may receive a single dose of RSV vaccine using shared clinical decision-making (SCDM)   Tetanus (Td) Vaccine - COST NOT COVERED BY MEDICARE PART B Following completion of primary series, a booster dose should be given every 10 years to maintain immunity against tetanus. Td may also be given as tetanus wound prophylaxis.   Tdap Vaccine - COST NOT COVERED BY MEDICARE PART B Recommended at least once for all adults. For pregnant patients, recommended with each pregnancy.   Shingles Vaccine (Shingrix) - COST NOT COVERED BY MEDICARE PART B  2 shot series recommended in those 19 years and older who have or will have weakened immune systems or those 50 years and older     Health Maintenance Due:      Topic Date Due   • Colorectal Cancer Screening  01/14/2021   • Hepatitis C Screening  Completed     Immunizations Due:      Topic Date Due   • Pneumococcal Vaccine: 65+ Years (1 of 1 - PCV) 06/10/2012   • Influenza Vaccine (1) 09/01/2024     Advance Directives   What are advance directives?  Advance directives are legal documents that state your wishes and plans for medical care. These plans are made ahead of time in case you lose your ability to make decisions for yourself. Advance directives can apply to any medical decision, such as the treatments you want, and if you want to donate organs.   What are the types of advance directives?  There are many types of advance directives, and each state has rules about how to use  them. You may choose a combination of any of the following:  Living will:  This is a written record of the treatment you want. You can also choose which treatments you do not want, which to limit, and which to stop at a certain time. This includes surgery, medicine, IV fluid, and tube feedings.   Durable power of  for healthcare (DPAHC):  This is a written record that states who you want to make healthcare choices for you when you are unable to make them for yourself. This person, called a proxy, is usually a family member or a friend. You may choose more than 1 proxy.  Do not resuscitate (DNR) order:  A DNR order is used in case your heart stops beating or you stop breathing. It is a request not to have certain forms of treatment, such as CPR. A DNR order may be included in other types of advance directives.  Medical directive:  This covers the care that you want if you are in a coma, near death, or unable to make decisions for yourself. You can list the treatments you want for each condition. Treatment may include pain medicine, surgery, blood transfusions, dialysis, IV or tube feedings, and a ventilator (breathing machine).  Values history:  This document has questions about your views, beliefs, and how you feel and think about life. This information can help others choose the care that you would choose.  Why are advance directives important?  An advance directive helps you control your care. Although spoken wishes may be used, it is better to have your wishes written down. Spoken wishes can be misunderstood, or not followed. Treatments may be given even if you do not want them. An advance directive may make it easier for your family to make difficult choices about your care.   Weight Management   Why it is important to manage your weight:  Being overweight increases your risk of health conditions such as heart disease, high blood pressure, type 2 diabetes, and certain types of cancer. It can also  increase your risk for osteoarthritis, sleep apnea, and other respiratory problems. Aim for a slow, steady weight loss. Even a small amount of weight loss can lower your risk of health problems.  How to lose weight safely:  A safe and healthy way to lose weight is to eat fewer calories and get regular exercise. You can lose up about 1 pound a week by decreasing the number of calories you eat by 500 calories each day.   Healthy meal plan for weight management:  A healthy meal plan includes a variety of foods, contains fewer calories, and helps you stay healthy. A healthy meal plan includes the following:  Eat whole-grain foods more often.  A healthy meal plan should contain fiber. Fiber is the part of grains, fruits, and vegetables that is not broken down by your body. Whole-grain foods are healthy and provide extra fiber in your diet. Some examples of whole-grain foods are whole-wheat breads and pastas, oatmeal, brown rice, and bulgur.  Eat a variety of vegetables every day.  Include dark, leafy greens such as spinach, kale, luis alfredo greens, and mustard greens. Eat yellow and orange vegetables such as carrots, sweet potatoes, and winter squash.   Eat a variety of fruits every day.  Choose fresh or canned fruit (canned in its own juice or light syrup) instead of juice. Fruit juice has very little or no fiber.  Eat low-fat dairy foods.  Drink fat-free (skim) milk or 1% milk. Eat fat-free yogurt and low-fat cottage cheese. Try low-fat cheeses such as mozzarella and other reduced-fat cheeses.  Choose meat and other protein foods that are low in fat.  Choose beans or other legumes such as split peas or lentils. Choose fish, skinless poultry (chicken or turkey), or lean cuts of red meat (beef or pork). Before you cook meat or poultry, cut off any visible fat.   Use less fat and oil.  Try baking foods instead of frying them. Add less fat, such as margarine, sour cream, regular salad dressing and mayonnaise to foods. Eat  fewer high-fat foods. Some examples of high-fat foods include french fries, doughnuts, ice cream, and cakes.  Eat fewer sweets.  Limit foods and drinks that are high in sugar. This includes candy, cookies, regular soda, and sweetened drinks.  Exercise:  Exercise at least 30 minutes per day on most days of the week. Some examples of exercise include walking, biking, dancing, and swimming. You can also fit in more physical activity by taking the stairs instead of the elevator or parking farther away from stores. Ask your healthcare provider about the best exercise plan for you.   Narcotic (Opioid) Safety    Use narcotics safely:  Take prescribed narcotics exactly as directed  Do not give narcotics to others or take narcotics that belong to someone else  Do not mix narcotics without medicines or alcohol  Do not drive or operate heavy machinery after you take the narcotic  Monitor for side effects and notify your healthcare provider if you experienced side effects such as nausea, sleepiness, itching, or trouble thinking clearly.    Manage constipation:    Constipation is the most common side effect of narcotic medicine. Constipation is when you have hard, dry bowel movements, or you go longer than usual between bowel movements. Tell your healthcare provider about all changes in your bowel movements while you are taking narcotics. He or she may recommend laxative medicine to help you have a bowel movement. He or she may also change the kind of narcotic you are taking, or change when you take it. The following are more ways you can prevent or relieve constipation:    Drink liquids as directed.  You may need to drink extra liquids to help soften and move your bowels. Ask how much liquid to drink each day and which liquids are best for you.  Eat high-fiber foods.  This may help decrease constipation by adding bulk to your bowel movements. High-fiber foods include fruits, vegetables, whole-grain breads and cereals, and  beans. Your healthcare provider or dietitian can help you create a high-fiber meal plan. Your provider may also recommend a fiber supplement if you cannot get enough fiber from food.  Exercise regularly.  Regular physical activity can help stimulate your intestines. Walking is a good exercise to prevent or relieve constipation. Ask which exercises are best for you.  Schedule a time each day to have a bowel movement.  This may help train your body to have regular bowel movements. Bend forward while you are on the toilet to help move the bowel movement out. Sit on the toilet for at least 10 minutes, even if you do not have a bowel movement.    Store narcotics safely:   Store narcotics where others cannot easily get them.  Keep them in a locked cabinet or secure area. Do not  keep them in a purse or other bag you carry with you. A person may be looking for something else and find the narcotics.  Make sure narcotics are stored out of the reach of children.  A child can easily overdose on narcotics. Narcotics may look like candy to a small child.    The best way to dispose of narcotics:      The laws vary by country and area. In the [place], the best way is to return the narcotics through a take-back program. This program is offered by the [place] Drug Enforcement Agency (JOSE [employer]). The following are options for using the program:  Take the narcotics to a [employer] collection site.  The site is often a law enforcement center. Call your local law enforcement center for scheduled take-back days in your area. You will be given information on where to go if the collection site is in a different location.  Take the narcotics to an approved pharmacy or hospital.  A pharmacy or hospital may be set up as a collection site. You will need to ask if it is a [employer] collection site if you were not directed there. A pharmacy or doctor's office may not be able to take back narcotics unless it is a [employer] site.  Use a mail-back system.  This means you  are given containers to put the narcotics into. You will then mail them in the containers.  Use a take-back drop box.  This is a place to leave the narcotics at any time. People and animals will not be able to get into the box. Your local law enforcement agency can tell you where to find a drop box in your area.    Other ways to manage pain:   Ask your healthcare provider about non-narcotic medicines to control pain.  Nonprescription medicines include NSAIDs (such as ibuprofen) and acetaminophen. Prescription medicines include muscle relaxers, antidepressants, and steroids.  Pain may be managed without any medicines.  Some ways to relieve pain include massage, aromatherapy, or meditation. Physical or occupational therapy may also help.    For more information:   Drug Enforcement Administration  [address] [place] [address]  Phone: 3- 895 - 498-1022  Web Address: https://www.deadiversion.[facility]gov/drug_disposal/    [place] Food and Drug Administration  [address] [place] [address]  Phone: 8- 409 - 458-0775  Web Address: http://www.fda.gov     © Copyright [employer] 2018 Information is for End User's use only and may not be sold, redistributed or otherwise used for commercial purposes. All illustrations and images included in CareNotes® are the copyrighted property of A.D.A.M., Inc. or [employer]

## 2024-07-30 ENCOUNTER — OFFICE VISIT (OUTPATIENT)
Dept: UROLOGY | Facility: CLINIC | Age: 79
End: 2024-07-30
Payer: MEDICARE

## 2024-07-30 VITALS
SYSTOLIC BLOOD PRESSURE: 122 MMHG | HEIGHT: 71 IN | HEART RATE: 63 BPM | OXYGEN SATURATION: 98 % | WEIGHT: 205 LBS | DIASTOLIC BLOOD PRESSURE: 68 MMHG | BODY MASS INDEX: 28.7 KG/M2

## 2024-07-30 DIAGNOSIS — C61 PROSTATE CANCER (HCC): Primary | ICD-10-CM

## 2024-07-30 PROCEDURE — 99213 OFFICE O/P EST LOW 20 MIN: CPT | Performed by: UROLOGY
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
## 2024-08-15 ENCOUNTER — HOSPITAL ENCOUNTER (OUTPATIENT)
Dept: MRI IMAGING | Facility: HOSPITAL | Age: 79
End: 2024-08-15
Payer: MEDICARE

## 2024-08-15 DIAGNOSIS — M25.511 SHOULDER PAIN, RIGHT: ICD-10-CM

## 2024-08-15 PROCEDURE — 73221 MRI JOINT UPR EXTREM W/O DYE: CPT

## 2024-08-18 PROBLEM — Z00.00 MEDICARE ANNUAL WELLNESS VISIT, SUBSEQUENT: Status: RESOLVED | Noted: 2020-12-31 | Resolved: 2024-08-18

## 2024-08-20 ENCOUNTER — TELEPHONE (OUTPATIENT)
Dept: FAMILY MEDICINE CLINIC | Facility: CLINIC | Age: 79
End: 2024-08-20

## 2024-08-20 ENCOUNTER — HOSPITAL ENCOUNTER (OUTPATIENT)
Dept: NON INVASIVE DIAGNOSTICS | Facility: HOSPITAL | Age: 79
Discharge: HOME | End: 2024-08-20
Attending: INTERNAL MEDICINE
Payer: MEDICARE

## 2024-08-20 ENCOUNTER — TELEPHONE (OUTPATIENT)
Age: 79
End: 2024-08-20

## 2024-08-20 VITALS
SYSTOLIC BLOOD PRESSURE: 122 MMHG | DIASTOLIC BLOOD PRESSURE: 68 MMHG | BODY MASS INDEX: 28.7 KG/M2 | WEIGHT: 205 LBS | HEART RATE: 63 BPM | HEIGHT: 71 IN

## 2024-08-20 DIAGNOSIS — J06.9 UPPER RESPIRATORY TRACT INFECTION, UNSPECIFIED TYPE: Primary | ICD-10-CM

## 2024-08-20 DIAGNOSIS — Z95.2 S/P TAVR (TRANSCATHETER AORTIC VALVE REPLACEMENT): ICD-10-CM

## 2024-08-20 LAB
AORTIC ROOT: 2.4 CM
AORTIC VALVE MEAN VELOCITY: 13.6 M/S
APICAL FOUR CHAMBER EJECTION FRACTION: 56 %
AV AREA BY CONTINUOUS VTI: 1.9 CM2
AV AREA PEAK VELOCITY: 1.8 CM2
AV LVOT MEAN GRADIENT: 1 MMHG
AV LVOT PEAK GRADIENT: 2 MMHG
AV MEAN GRADIENT: 8 MMHG
AV PEAK GRADIENT: 15 MMHG
AV VALVE AREA: 1.91 CM2
AV VELOCITY RATIO: 0.4
BSA FOR ECHO PROCEDURE: 2.13 M2
DOP CALC AO PEAK VEL: 1.94 M/S
DOP CALC AO VTI: 47.1 CM
DOP CALC LVOT AREA: 4.52 CM2
DOP CALC LVOT CARDIAC INDEX: 2.51 L/MIN/M2
DOP CALC LVOT CARDIAC OUTPUT: 5.35 L/MIN
DOP CALC LVOT DIAMETER: 2.4 CM
DOP CALC LVOT PEAK VEL VTI: 19.85 CM
DOP CALC LVOT PEAK VEL: 0.77 M/S
DOP CALC LVOT STROKE INDEX: 40.4 ML/M2
DOP CALC LVOT STROKE VOLUME: 89.75
E WAVE DECELERATION TIME: 217 MS
E/A RATIO: 1
FRACTIONAL SHORTENING: 25 (ref 28–44)
INTERVENTRICULAR SEPTUM IN DIASTOLE (PARASTERNAL SHORT AXIS VIEW): 1.2 CM
INTERVENTRICULAR SEPTUM: 1.2 CM (ref 0.6–1.1)
LAAS-AP2: 28.3 CM2
LAAS-AP4: 20.2 CM2
LEFT ATRIUM SIZE: 4.5 CM
LEFT ATRIUM VOLUME (MOD BIPLANE): 78 ML
LEFT ATRIUM VOLUME INDEX (MOD BIPLANE): 36.6 ML/M2
LEFT INTERNAL DIMENSION IN SYSTOLE: 3.9 CM (ref 2.1–4)
LEFT VENTRICULAR INTERNAL DIMENSION IN DIASTOLE: 5.2 CM (ref 3.5–6)
LEFT VENTRICULAR POSTERIOR WALL IN END DIASTOLE: 1.1 CM
LEFT VENTRICULAR STROKE VOLUME: 65 ML
LVSV (TEICH): 65 ML
MV E'TISSUE VEL-LAT: 7 CM/S
MV E'TISSUE VEL-SEP: 7 CM/S
MV PEAK A VEL: 0.78 M/S
MV PEAK E VEL: 78 CM/S
MV STENOSIS PRESSURE HALF TIME: 63 MS
MV VALVE AREA P 1/2 METHOD: 3.49
RIGHT ATRIUM AREA SYSTOLE A4C: 21.6 CM2
RIGHT VENTRICLE ID DIMENSION: 3.8 CM
SL CV LEFT ATRIUM LENGTH A2C: 6.7 CM
SL CV PED ECHO LEFT VENTRICLE DIASTOLIC VOLUME (MOD BIPLANE) 2D: 129 ML
SL CV PED ECHO LEFT VENTRICLE SYSTOLIC VOLUME (MOD BIPLANE) 2D: 64 ML
TRICUSPID ANNULAR PLANE SYSTOLIC EXCURSION: 2.9 CM

## 2024-08-20 PROCEDURE — 93306 TTE W/DOPPLER COMPLETE: CPT | Performed by: INTERNAL MEDICINE

## 2024-08-20 PROCEDURE — 93306 TTE W/DOPPLER COMPLETE: CPT

## 2024-08-20 RX ORDER — AMOXICILLIN 500 MG/1
500 CAPSULE ORAL EVERY 8 HOURS SCHEDULED
Qty: 21 CAPSULE | Refills: 0 | Status: SHIPPED | OUTPATIENT
Start: 2024-08-20 | End: 2024-08-20 | Stop reason: ALTCHOICE

## 2024-08-20 RX ORDER — AMOXICILLIN 500 MG/1
500 CAPSULE ORAL EVERY 8 HOURS SCHEDULED
Qty: 21 CAPSULE | Refills: 0 | Status: SHIPPED | OUTPATIENT
Start: 2024-08-20 | End: 2024-08-27

## 2024-08-28 DIAGNOSIS — Z00.6 ENCOUNTER FOR EXAMINATION FOR NORMAL COMPARISON OR CONTROL IN CLINICAL RESEARCH PROGRAM: ICD-10-CM

## 2024-09-06 ENCOUNTER — APPOINTMENT (OUTPATIENT)
Dept: LAB | Facility: MEDICAL CENTER | Age: 79
End: 2024-09-06

## 2024-09-06 DIAGNOSIS — Z00.6 ENCOUNTER FOR EXAMINATION FOR NORMAL COMPARISON OR CONTROL IN CLINICAL RESEARCH PROGRAM: ICD-10-CM

## 2024-09-06 PROCEDURE — 36415 COLL VENOUS BLD VENIPUNCTURE: CPT

## 2024-09-09 ENCOUNTER — OFFICE VISIT (OUTPATIENT)
Dept: FAMILY MEDICINE CLINIC | Facility: CLINIC | Age: 79
End: 2024-09-09
Payer: MEDICARE

## 2024-09-09 ENCOUNTER — APPOINTMENT (OUTPATIENT)
Dept: RADIOLOGY | Facility: MEDICAL CENTER | Age: 79
End: 2024-09-09
Payer: MEDICARE

## 2024-09-09 VITALS
RESPIRATION RATE: 18 BRPM | TEMPERATURE: 98.6 F | DIASTOLIC BLOOD PRESSURE: 68 MMHG | BODY MASS INDEX: 28.28 KG/M2 | SYSTOLIC BLOOD PRESSURE: 110 MMHG | WEIGHT: 202 LBS | OXYGEN SATURATION: 99 % | HEIGHT: 71 IN | HEART RATE: 72 BPM

## 2024-09-09 DIAGNOSIS — M25.562 PAIN AND SWELLING OF LEFT KNEE: ICD-10-CM

## 2024-09-09 DIAGNOSIS — M25.462 PAIN AND SWELLING OF LEFT KNEE: Primary | ICD-10-CM

## 2024-09-09 DIAGNOSIS — M25.562 PAIN AND SWELLING OF LEFT KNEE: Primary | ICD-10-CM

## 2024-09-09 DIAGNOSIS — M25.462 PAIN AND SWELLING OF LEFT KNEE: ICD-10-CM

## 2024-09-09 PROCEDURE — 73560 X-RAY EXAM OF KNEE 1 OR 2: CPT

## 2024-09-09 PROCEDURE — 99213 OFFICE O/P EST LOW 20 MIN: CPT | Performed by: PHYSICIAN ASSISTANT

## 2024-09-09 NOTE — ASSESSMENT & PLAN NOTE
Evaluate further with xray imaging. Pt will likely benefit from physical therapy. For now, continue RICE.

## 2024-09-09 NOTE — PROGRESS NOTES
"Ambulatory Visit  Name: Derrek Rivera      : 1945      MRN: [id number]  Encounter Provider: Geni Davis PA-C  Encounter Date: 2024   Encounter department: [facility]    Assessment & Plan   1. Pain and swelling of left knee  Assessment & Plan:  Evaluate further with xray imaging. Pt will likely benefit from physical therapy. For now, continue RICE.  Orders:  -     XR knee 1 or 2 vw left; Future; Expected date: 2024      Depression Screening and Follow-up Plan: Patient was screened for depression during today's encounter. They screened negative with a PHQ-2 score of 0.      History of Present Illness     Derrek is here today after falling on L knee few days ago. Has had pain, swelling, and feeling of instability in L knee since. He is using heat/ice on the knee as well as taking Tylenol. Notes that there has been some improvement since initial injury.         Review of Systems   Constitutional:  Negative for chills and fever.   HENT:  Negative for ear pain and sore throat.    Eyes:  Negative for pain and visual disturbance.   Respiratory:  Negative for cough and shortness of breath.    Cardiovascular:  Negative for chest pain and palpitations.   Gastrointestinal:  Negative for abdominal pain and vomiting.   Genitourinary:  Negative for dysuria and hematuria.   Musculoskeletal:  Positive for arthralgias and joint swelling. Negative for back pain.   Skin:  Negative for color change and rash.   Neurological:  Negative for seizures and syncope.   All other systems reviewed and are negative.      Objective     /68 (BP Location: Left arm, Patient Position: Sitting, Cuff Size: Adult)   Pulse 72   Temp 98.6 °F (37 °C) (Temporal)   Resp 18   Ht 5' 11\" (1.803 m)   Wt 91.6 kg (202 lb)   SpO2 99%   BMI 28.17 kg/m²     Physical Exam  Vitals reviewed.   Constitutional:       General: He is not in acute distress.     Appearance: He is well-developed. He is not diaphoretic.   HENT:      " Head: Normocephalic and atraumatic.      Right Ear: Hearing, tympanic membrane, ear canal and external ear normal.      Left Ear: Hearing, tympanic membrane, ear canal and external ear normal.      Nose: Nose normal.      Mouth/Throat:      Mouth: Mucous membranes are moist.      Pharynx: Oropharynx is clear. Uvula midline. No oropharyngeal exudate.   Eyes:      General: No scleral icterus.        Right eye: No discharge.         Left eye: No discharge.      Conjunctiva/sclera: Conjunctivae normal.   Neck:      Thyroid: No thyromegaly.      Vascular: No carotid bruit.   Cardiovascular:      Rate and Rhythm: Normal rate and regular rhythm.      Heart sounds: Normal heart sounds. No murmur heard.  Pulmonary:      Effort: Pulmonary effort is normal. No respiratory distress.      Breath sounds: Normal breath sounds. No wheezing.   Abdominal:      General: Bowel sounds are normal. There is no distension.      Palpations: Abdomen is soft. There is no mass.      Tenderness: There is no abdominal tenderness. There is no guarding or rebound.   Musculoskeletal:      Cervical back: Neck supple.      Left knee: Swelling, effusion and ecchymosis present. No crepitus. Decreased range of motion. Tenderness present.   Lymphadenopathy:      Cervical: No cervical adenopathy.   Skin:     General: Skin is warm and dry.      Findings: No erythema or rash.   Neurological:      Mental Status: He is alert and oriented to person, place, and time.   Psychiatric:         Behavior: Behavior normal.         Thought Content: Thought content normal.         Judgment: Judgment normal.       Administrative Statements

## 2024-09-10 DIAGNOSIS — M25.462 PAIN AND SWELLING OF LEFT KNEE: Primary | ICD-10-CM

## 2024-09-10 DIAGNOSIS — M25.562 PAIN AND SWELLING OF LEFT KNEE: Primary | ICD-10-CM

## 2024-09-11 ENCOUNTER — EVALUATION (OUTPATIENT)
Dept: PHYSICAL THERAPY | Facility: CLINIC | Age: 79
End: 2024-09-11
Payer: MEDICARE

## 2024-09-11 DIAGNOSIS — M25.562 PAIN AND SWELLING OF LEFT KNEE: Primary | ICD-10-CM

## 2024-09-11 DIAGNOSIS — M25.462 PAIN AND SWELLING OF LEFT KNEE: Primary | ICD-10-CM

## 2024-09-11 PROCEDURE — 97161 PT EVAL LOW COMPLEX 20 MIN: CPT | Performed by: PHYSICAL THERAPIST

## 2024-09-11 PROCEDURE — 97110 THERAPEUTIC EXERCISES: CPT | Performed by: PHYSICAL THERAPIST

## 2024-09-11 NOTE — PROGRESS NOTES
PT Evaluation     Today's date: 2024  Patient name: Derrek Rivera  : 1945  MRN: [id number]  Referring provider: Geni Davis PA-C  Dx:   Encounter Diagnosis     ICD-10-CM    1. Pain and swelling of left knee  M25.562 Ambulatory Referral to Physical Therapy    M25.462                      Assessment  Impairments: abnormal gait, abnormal or restricted ROM, abnormal movement, activity intolerance, impaired physical strength, lacks appropriate home exercise program, pain with function and poor posture   Symptom irritability: low    Assessment details: Derrek Rivera is a pleasant 79 y.o. male who presents with acute knee pain from a fall. His impairments have lead to participation restrictions in walking, golfing, and stair climbing. He would benefit from working with a skilled PT in order to increase participation in aforementioned participation restrictions.    No further referral appears necessary at this time based upon examination results.    Pt. will benefit from skilled PT services that includes manual therapy techniques to enhance tissue extensibility, neuromuscular re-education to facilitate motor control, therapeutic exercise to increase functional mobility, and modalities prn to reduce pain and inflammation.            Understanding of Dx/Px/POC: good     Prognosis: good  Prognosis details: Positive prognostic indicators include positive attitude toward recovery, good understanding of diagnosis and treatment plan options, acuity of symptoms, and absence of observed red flags.  Negative prognostic indicators include none.      Goals  ST. Independent with HEP in 2 weeks  2. Pt will have verbal report of improvement in symptoms by >/=25% in 2 weeks     To be achieved by D/C   LT. Pt will improve FOTO score by >/= 9 points in 6 weeks  2. Pt will improve FOTO score to >/= 74 by visit # 11  3. Pt will be able to return to golfing with no restrictions   4. Pt will be able to return to  recreational walking with no restrictions   5. Pt will be able to ascend/descend stairs with no difficulty       Plan  Patient would benefit from: skilled physical therapy    Planned therapy interventions: activity modification, manual therapy, motor coordination training, neuromuscular re-education, patient education, self care, therapeutic activities, therapeutic exercise, graded activity, home exercise program, graded exercise, functional ROM exercises and strengthening    Frequency: 2x week  Duration in weeks: 8  Plan of Care beginning date: 2024  Plan of Care expiration date: 2024  Treatment plan discussed with: patient        Subjective Evaluation    History of Present Illness  Mechanism of injury: Pt reports that last week he fell and hit his knee. He was seen and had x rays and it was negative. He notes that since then he has been getting significant improvement and is able to walk better but still is having difficulty with stairs and has not been back to golfing or his recreational exercise.   Patient Goals  Patient goals for therapy: decreased pain  Patient goal: be able to sleep through the night, get back to walking for exercise  Pain  At best pain ratin  At worst pain rating: 3  Location: lateral aspect of knee  Progression: improved    Social Support  Lives in: multiple-level home (4 levels)  Lives with: spouse          Objective     Observations   Left Knee   Positive for edema.     Active Range of Motion   Left Knee   Flexion: 110 degrees   Extension: 0 degrees   Extensor la degrees     Right Knee   Normal active range of motion    Passive Range of Motion   Left Knee   Flexion: 120 degrees with pain  Extension: 0 degrees     Mobility   Patellar Mobility:   Left Knee   Hypomobile: left medial, left lateral, left superior and left inferior    Right Knee   WFL: medial, lateral, superior and inferior    Strength/Myotome Testing     Left Knee   Flexion: 4+  Extension: 4+  Quadriceps  contraction: fair    Right Knee   Flexion: 5  Extension: 5  Quadriceps contraction: good    General Comments:      Knee Comments  5TSTS: 25s    RSLS: 7s   L SLS : 2s             Precautions: hx of TAVR,       Manuals 9/11                                                                Neuro Re-Ed             Alisson c holds             SAQ c holds             LAQ c holds             SLS                                                     Ther Ex             Pt education on HEP, POC 8 min            Bike for ROM 10 min             Heel slides             SLR flexion 2x10            SL hip abduction  2x10            Standing hip 3 way c TB                                       Ther Activity             STS c KB             Stepping up fw             Stepping up lat             Gait Training                                       Modalities

## 2024-09-13 ENCOUNTER — OFFICE VISIT (OUTPATIENT)
Dept: PHYSICAL THERAPY | Facility: CLINIC | Age: 79
End: 2024-09-13
Payer: MEDICARE

## 2024-09-13 DIAGNOSIS — M25.562 PAIN AND SWELLING OF LEFT KNEE: Primary | ICD-10-CM

## 2024-09-13 DIAGNOSIS — M25.462 PAIN AND SWELLING OF LEFT KNEE: Primary | ICD-10-CM

## 2024-09-13 PROCEDURE — 97140 MANUAL THERAPY 1/> REGIONS: CPT

## 2024-09-13 PROCEDURE — 97110 THERAPEUTIC EXERCISES: CPT

## 2024-09-13 PROCEDURE — 97112 NEUROMUSCULAR REEDUCATION: CPT

## 2024-09-13 NOTE — PROGRESS NOTES
"Daily Note     Today's date: 2024  Patient name: Derrek Rivera  : 1945  MRN: [id number]  Referring provider: Geni Davis PA-C  Dx:   Encounter Diagnosis     ICD-10-CM    1. Pain and swelling of left knee  M25.562     M25.462           Start Time: 0900  Stop Time: 0955  Total time in clinic (min): 55 minutes    Subjective: Patient reports that some days his knee bothers him and other days his knee does not bother him.  Patient states he played golf yesterday and his dis not bother him anymore than normal.      Objective: See treatment diary below      Assessment: Tolerated treatment well.   Patient participated in skilled PT session focused on strengthening, stretching, and ROM.  Patient able to complete exercise program with no increase in pain.  Patient did experience some mild pain with SAQ lateral aspect of the L knee. Patient needs some v.c. for staying on task with exercises.  Added Gastroc/HS stretching to program with patient experiencing some relief in tightness.  Patient would continue to benefit from skilled PT interventions to address strengthening, stretching, and ROM. Patient demonstrated fatigue post treatment      Plan: Continue per plan of care.      Precautions: hx of TAVR,       Manuals              Gastroc/HS stretching 10'                                                  Neuro Re-Ed             Bridges c holds  5\" 2x10           SAQ c holds  5\" 20x           LAQ c holds  5\" 2x10 ea           SLS                                                     Ther Ex             Pt education on HEP, POC 8 min            Bike for ROM 10 min  L5 Nustep x 10 min           Heel slides             SLR flexion 2x10 2x10           SL hip abduction  2x10 2x10            Standing hip 3 way c TB  L2 2x10 ea                                     Ther Activity             STS c KB             Stepping up fw             Stepping up lat             Gait Training                                     "   Modalities

## 2024-09-17 ENCOUNTER — OFFICE VISIT (OUTPATIENT)
Dept: PHYSICAL THERAPY | Facility: CLINIC | Age: 79
End: 2024-09-17
Payer: MEDICARE

## 2024-09-17 DIAGNOSIS — M25.562 PAIN AND SWELLING OF LEFT KNEE: Primary | ICD-10-CM

## 2024-09-17 DIAGNOSIS — M25.462 PAIN AND SWELLING OF LEFT KNEE: Primary | ICD-10-CM

## 2024-09-17 LAB
APOB+LDLR+PCSK9 GENE MUT ANL BLD/T: NOT DETECTED
BRCA1+BRCA2 DEL+DUP + FULL MUT ANL BLD/T: NOT DETECTED
MLH1+MSH2+MSH6+PMS2 GN DEL+DUP+FUL M: NOT DETECTED

## 2024-09-17 PROCEDURE — 97140 MANUAL THERAPY 1/> REGIONS: CPT

## 2024-09-17 PROCEDURE — 97110 THERAPEUTIC EXERCISES: CPT

## 2024-09-17 PROCEDURE — 97112 NEUROMUSCULAR REEDUCATION: CPT

## 2024-09-17 NOTE — PROGRESS NOTES
"Daily Note     Today's date: 2024  Patient name: Derrek Rivera  : 1945  MRN: [id number]  Referring provider: Geni Davis PA-C  Dx:   Encounter Diagnosis     ICD-10-CM    1. Pain and swelling of left knee  M25.562     M25.462           Start Time: 1400  Stop Time: 1450  Total time in clinic (min): 50 minutes    Subjective: Patient reports his knee has been getting better every day.  Patient reports he is getting back to his normal ADLs.      Objective: See treatment diary below      Assessment: Tolerated treatment well. Patient would benefit from continued PT.  PT performed manual therapy consisting of SAD and LAD to improve mobility and decrease pain in L knee.  Patient continued TherEx to improve knee mobility and strength.  Patient continued Neuro Muscular Re-Ed to improve posterior muscle chain activation.  Patient would benefit from skilled PT to address functional deficits and pain.       Plan: Continue per plan of care.  Progress treatment as tolerated.       Precautions: hx of TAVR,       Manuals             Gastroc/HS stretching 10' 10' AT LAD and SAD                                                 Neuro Re-Ed             Bridges c holds  5\" 2x10 5\" 20x          SAQ c holds  5\" 20x 5\" 20x          LAQ c holds  5\" 2x10 ea 5\" 20x          SLS                                                     Ther Ex             Pt education on HEP, POC 8 min            Bike for ROM 10 min  L5 Nustep x 10 min L5 10' Nustep          Heel slides             SLR flexion 2x10 2x10 2x10          SL hip abduction  2x10 2x10  2x10          Standing hip 3 way c TB  L2 2x10 ea L2 2x10 ea          Calf Stretch 1/2 roll B/L   3x30\"                       Ther Activity             STS c KB             Stepping up fw             Stepping up lat             Gait Training                                       Modalities                                              "

## 2024-09-20 ENCOUNTER — OFFICE VISIT (OUTPATIENT)
Dept: PHYSICAL THERAPY | Facility: CLINIC | Age: 79
End: 2024-09-20
Payer: MEDICARE

## 2024-09-20 DIAGNOSIS — M25.462 PAIN AND SWELLING OF LEFT KNEE: Primary | ICD-10-CM

## 2024-09-20 DIAGNOSIS — M25.562 PAIN AND SWELLING OF LEFT KNEE: Primary | ICD-10-CM

## 2024-09-20 PROCEDURE — 97110 THERAPEUTIC EXERCISES: CPT

## 2024-09-20 PROCEDURE — 97140 MANUAL THERAPY 1/> REGIONS: CPT

## 2024-09-20 PROCEDURE — 97112 NEUROMUSCULAR REEDUCATION: CPT

## 2024-09-20 NOTE — PROGRESS NOTES
PT Discharge    Today's date: 2024  Patient name: Derrek Rivera  : 1945  MRN: [id number]  Referring provider: Geni Davis PA-C  Dx:   Encounter Diagnosis     ICD-10-CM    1. Pain and swelling of left knee  M25.562     M25.462             Start Time: 1030  Stop Time: 1130  Total time in clinic (min): 60 minutes    Assessment  Symptom irritability: low    Assessment details: Patient has met nearly all established goals.  Patient is no longer in need of PT.  Patient reports very minimal pain and wishes to continue HEP at home.  Patient is IND with HEP.  Patient has made significant improvements in ROM and strength.  Patient is discharged following today's session.            Understanding of Dx/Px/POC: good     Prognosis: good  Prognosis details: Positive prognostic indicators include positive attitude toward recovery, good understanding of diagnosis and treatment plan options, acuity of symptoms, and absence of observed red flags.  Negative prognostic indicators include none.      Goals  ST. Independent with HEP in 2 weeks MET  2. Pt will have verbal report of improvement in symptoms by >/=25% in 2 weeks MET    To be achieved by D/C   LT. Pt will improve FOTO score by >/= 9 points in 6 weeks MET  2. Pt will improve FOTO score to >/= 74 by visit # 11 Near Met  3. Pt will be able to return to golfing with no restrictions MET  4. Pt will be able to return to recreational walking with no restrictions  MET  5. Pt will be able to ascend/descend stairs with no difficulty MET      Plan  Patient would benefit from: skilled physical therapy    Planned therapy interventions: activity modification, manual therapy, motor coordination training, neuromuscular re-education, patient education, self care, therapeutic activities, therapeutic exercise, graded activity, home exercise program, graded exercise, functional ROM exercises and strengthening    Frequency: 2x week  Duration in weeks: 8  Plan of Care  beginning date: 2024  Plan of Care expiration date: 2024  Treatment plan discussed with: patient        Subjective Evaluation    History of Present Illness  Mechanism of injury: Pt reports that last week he fell and hit his knee. He was seen and had x rays and it was negative. He notes that since then he has been getting significant improvement and is able to walk better but still is having difficulty with stairs and has not been back to golfing or his recreational exercise.     :  Patient reports 95% improvement since beginning physical therapy.  He notes he has been getting better and better every day. Patient reports he only has an occasional twinge in the knee at this point.  He wishes to continue exercises at home.  He is satisfied with his progress.  Quality of life: excellent    Patient Goals  Patient goals for therapy: decreased pain  Patient goal: be able to sleep through the night, get back to walking for exercise  Pain  Current pain ratin  At best pain ratin  At worst pain ratin  Location: lateral aspect of knee  Progression: resolved    Social Support  Lives in: multiple-level home (4 levels)  Lives with: spouse          Objective     Observations   Left Knee   Negative for edema.     Active Range of Motion   Left Knee   Flexion: 132 degrees   Extension: 0 degrees   Extensor la degrees     Right Knee   Normal active range of motion    Passive Range of Motion   Left Knee   Flexion: 135 degrees   Extension: 0 degrees     Mobility   Patellar Mobility:   Left Knee   WFL: medial, lateral, superior and inferior.     Right Knee   WFL: medial, lateral, superior and inferior    Strength/Myotome Testing     Left Knee   Flexion: 5  Extension: 5  Quadriceps contraction: good    Right Knee   Flexion: 5  Extension: 5  Quadriceps contraction: good    General Comments:      Knee Comments  5TSTS: 17s     RSLS: 7s   L SLS : 2s      Flowsheet Rows      Flowsheet Row Most Recent Value   PT/OT  "G-Codes    Current Score 50   Projected Score 74               Precautions: hx of TAVR,       Manuals 9/11 9/13 9/17 9/20 D/C           Gastroc/HS stretching 10' 10' AT LAD and SAD 10' AT LAD and SAD                                                Neuro Re-Ed             Bridges c holds  5\" 2x10 5\" 20x 5\" 20x         SAQ c holds  5\" 20x 5\" 20x 5\" 20x         LAQ c holds  5\" 2x10 ea 5\" 20x 5\" 20x         SLS                                                     Ther Ex             Pt education on HEP, POC 8 min            Bike for ROM 10 min  L5 Nustep x 10 min L5 10' Nustep L5 10' NuStep         Heel slides             SLR flexion 2x10 2x10 2x10 2x10          SL hip abduction  2x10 2x10  2x10 2x10         Standing hip 3 way c TB  L2 2x10 ea L2 2x10 ea          Calf Stretch 1/2 roll B/L   3x30\" 3x30\"                      Ther Activity             STS c KB             Stepping up fw             Stepping up lat             Gait Training                                       Modalities                                         "

## 2025-04-23 ENCOUNTER — NURSE TRIAGE (OUTPATIENT)
Age: 80
End: 2025-04-23

## 2025-04-23 NOTE — TELEPHONE ENCOUNTER
"Answer Assessment - Initial Assessment Questions  1. REASON FOR CALL: \"What is the main reason for your call?\" or \"How can I best help you?\"      Patient called in to report he moved to [place] and his new urologist is in need of his MRI and biopsy results. Faxed to [facility] attention Dr. Davis at [phone].    Protocols used: Information Only Call - No Triage-Adult-[place]    "

## 2025-04-30 NOTE — TELEPHONE ENCOUNTER
Pt calling in regards to medical records that he is requesting to be sent to his new urologist in [place].     Pt asking if we could send MRI and biopsy results to [facility] (ATTN Dr Davis) and pt provided updated fax number of [phone]. Please review.     Pt CB if needed- [phone]

## 2025-05-01 ENCOUNTER — TELEPHONE (OUTPATIENT)
Dept: FAMILY MEDICINE CLINIC | Facility: CLINIC | Age: 80
End: 2025-05-01

## 2025-05-01 NOTE — TELEPHONE ENCOUNTER
I left voice message to call me. I need information on dates of medical records being requested. Please send call to office.

## 2025-07-03 NOTE — TELEPHONE ENCOUNTER
Patient called to say that he is just getting over a cold but has lingering symptoms - chest congestions and nasal drip.  Also, the last 2 days, he woke up with his eye pasted shut.  Is there anything doctor can recommend for this?  Also, TRENA, he had his Echo done earlier today. Would like her to take a look at it.  Please contact patient and advise.  Thank you.   2 = A lot of assistance

## (undated) DEVICE — GLIDESHEATH SLENDER STAINLESS STEEL KIT: Brand: GLIDESHEATH SLENDER

## (undated) DEVICE — DEFIB ADULT ELECTRODE CARDINAL

## (undated) DEVICE — CARDIO PERI-GROIN: Brand: CONVERTORS

## (undated) DEVICE — PANNUS RETENTION SYSTEM

## (undated) DEVICE — CATH DIAG 5FR IMPULSE 110CM PIG

## (undated) DEVICE — FORMALIN PRE-FILLED 10 PCT PROSTATE BIOPSY

## (undated) DEVICE — NEEDLE 25G X 1 1/2

## (undated) DEVICE — MICROPUNCTURE KIT 5FR

## (undated) DEVICE — CATH DIAG 6FR IMPULSE 100CM FR4

## (undated) DEVICE — PREP PAD BNS: Brand: CONVERTORS

## (undated) DEVICE — GLOVE INDICATOR PI UNDERGLOVE SZ 7 BLUE

## (undated) DEVICE — MAX-CORE® DISPOSABLE CORE BIOPSY INSTRUMENT, 18G X 20CM: Brand: MAX-CORE

## (undated) DEVICE — PAD GROUNDING ADULT

## (undated) DEVICE — Device

## (undated) DEVICE — TR BAND RADIAL ARTERY COMPRESSION DEVICE: Brand: TR BAND

## (undated) DEVICE — HEAVY DUTY TABLE COVER: Brand: CONVERTORS

## (undated) DEVICE — GAUZE SPONGES,16 PLY: Brand: CURITY

## (undated) DEVICE — PINNACLE INTRODUCER SHEATH: Brand: PINNACLE

## (undated) DEVICE — 3000CC GUARDIAN II: Brand: GUARDIAN

## (undated) DEVICE — ULTRASOUND GEL STERILE FOIL PK

## (undated) DEVICE — GUIDEWIRE WHOLEY HI TORQUE INTERM MOD J .035 145CM

## (undated) DEVICE — ADHESIVE SKIN HIGH VISCOSITY EXOFIN 1ML

## (undated) DEVICE — 3M™ TEGADERM™ TRANSPARENT FILM DRESSING FRAME STYLE, 1626W, 4 IN X 4-3/4 IN (10 CM X 12 CM), 50/CT 4CT/CASE: Brand: 3M™ TEGADERM™

## (undated) DEVICE — SYSTEM TRANSPERINEAL ACCESS PRECISIONPOINT

## (undated) DEVICE — SWAN-GANZ BIPOLAR PACING CATHETER: Brand: SWAN-GANZ

## (undated) DEVICE — THERMOFLECT BLANKET, L, 25EA                               TS THERMOFLECT BLANKET, 48" X 84", SILVER, 5/BG, 5 BG/CS NW: Brand: THERMOFLECT

## (undated) DEVICE — TELFA NON-ADHERENT ABSORBENT DRESSING: Brand: TELFA

## (undated) DEVICE — SYRINGE 10ML LL CONTROL TOP

## (undated) DEVICE — X-RAY DETECTABLE SPONGES,16 PLY: Brand: VISTEC

## (undated) DEVICE — DGW .035 FC STR 260CM TEF: Brand: EMERALD

## (undated) DEVICE — CARDIOVASCULAR SPLIT DRAPE: Brand: CONVERTORS

## (undated) DEVICE — INTENDED FOR TISSUE SEPARATION, AND OTHER PROCEDURES THAT REQUIRE A SHARP SURGICAL BLADE TO PUNCTURE OR CUT.: Brand: BARD-PARKER ® CARBON RIB-BACK BLADES

## (undated) DEVICE — BETHLEHEM MAJOR GENERAL PACK: Brand: CARDINAL HEALTH

## (undated) DEVICE — SUT SILK 0 CT-1 30 IN 424H

## (undated) DEVICE — 3M™ TRANSPORE™ WHITE SURGICAL TAPE 1534-1, 1 INCH X 10 YARD (2,5CM X 9,1M), 12 ROLLS/CARTON 10 CARTONS/CASE: Brand: 3M™ TRANSPORE™

## (undated) DEVICE — RADIFOCUS OPTITORQUE ANGIOGRAPHIC CATHETER: Brand: OPTITORQUE

## (undated) DEVICE — CATH DIAG 5FR IMPULSE 100CM FR4

## (undated) DEVICE — UTILITY MARKER,BLACK WITH LABELS: Brand: DEVON

## (undated) DEVICE — SPECIMEN CONTAINER STERILE PEEL PACK

## (undated) DEVICE — GLOVE SRG BIOGEL ECLIPSE 7

## (undated) DEVICE — 3M™ TEGADERM™ TRANSPARENT FILM DRESSING FRAME STYLE, 1628, 6 IN X 8 IN (15 CM X 20 CM), 10/CT 8CT/CASE: Brand: 3M™ TEGADERM™

## (undated) DEVICE — SCD SEQUENTIAL COMPRESSION COMFORT SLEEVE MEDIUM KNEE LENGTH: Brand: KENDALL SCD

## (undated) DEVICE — GUIDEWIRE LUNDERQUIST TSCMG-35-300-LESDC

## (undated) DEVICE — TRAY FOLEY 16FR SURESTEP TEMP SENS URIMETER STAT LOK

## (undated) DEVICE — PROBE ULTRASOUND TRIPLANE TRANSRECTAL

## (undated) DEVICE — DRESSING ALLEVYN LIFE SACRAL 6.75 X 6.5 IN

## (undated) DEVICE — STERILE SURGICAL LUBRICANT,  TUBE: Brand: SURGILUBE

## (undated) DEVICE — DGW .035 FC J3MM 150CM TEF: Brand: EMERALD

## (undated) DEVICE — DGW .035 FC J3MM 260CM TEF: Brand: EMERALD